# Patient Record
Sex: MALE | Race: WHITE | Employment: OTHER | URBAN - METROPOLITAN AREA
[De-identification: names, ages, dates, MRNs, and addresses within clinical notes are randomized per-mention and may not be internally consistent; named-entity substitution may affect disease eponyms.]

---

## 2018-06-05 PROBLEM — F39 MOOD DISORDER (HCC): Status: ACTIVE | Noted: 2018-06-05

## 2018-09-01 ENCOUNTER — HOSPITAL ENCOUNTER (EMERGENCY)
Age: 43
Discharge: HOME | End: 2018-09-01
Attending: EMERGENCY MEDICINE
Payer: MEDICARE

## 2018-09-01 VITALS
WEIGHT: 160 LBS | HEIGHT: 72 IN | BODY MASS INDEX: 21.67 KG/M2 | TEMPERATURE: 97.5 F | DIASTOLIC BLOOD PRESSURE: 106 MMHG | OXYGEN SATURATION: 95 % | RESPIRATION RATE: 16 BRPM | SYSTOLIC BLOOD PRESSURE: 150 MMHG | HEART RATE: 118 BPM

## 2018-09-01 DIAGNOSIS — F10.920 ACUTE ALCOHOLIC INTOXICATION WITHOUT COMPLICATION (HCC): ICD-10-CM

## 2018-09-01 DIAGNOSIS — F17.200 SMOKER: ICD-10-CM

## 2018-09-01 DIAGNOSIS — F41.0 PANIC ATTACKS: Primary | ICD-10-CM

## 2018-09-01 DIAGNOSIS — F99 CO-OCCURRENCE OF MULTIPLE PSYCHIATRIC DISORDERS: ICD-10-CM

## 2018-09-01 DIAGNOSIS — R03.0 ELEVATED BLOOD PRESSURE READING: ICD-10-CM

## 2018-09-01 PROCEDURE — 6370000000 HC RX 637 (ALT 250 FOR IP): Performed by: EMERGENCY MEDICINE

## 2018-09-01 PROCEDURE — 99283 EMERGENCY DEPT VISIT LOW MDM: CPT

## 2018-09-01 RX ORDER — HYDROXYZINE HYDROCHLORIDE 25 MG/1
25 TABLET, FILM COATED ORAL 3 TIMES DAILY PRN
Qty: 30 TABLET | Refills: 0 | Status: SHIPPED | OUTPATIENT
Start: 2018-09-01 | End: 2018-09-11

## 2018-09-01 RX ORDER — RISPERIDONE 4 MG/1
4 TABLET, FILM COATED ORAL DAILY
COMMUNITY
End: 2019-01-05 | Stop reason: ALTCHOICE

## 2018-09-01 RX ORDER — HYDROXYZINE PAMOATE 25 MG/1
25 CAPSULE ORAL ONCE
Status: COMPLETED | OUTPATIENT
Start: 2018-09-01 | End: 2018-09-01

## 2018-09-01 RX ORDER — QUETIAPINE FUMARATE 100 MG/1
50 TABLET, FILM COATED ORAL 3 TIMES DAILY
COMMUNITY
End: 2019-12-25

## 2018-09-01 RX ADMIN — HYDROXYZINE PAMOATE 25 MG: 25 CAPSULE ORAL at 00:51

## 2018-09-01 NOTE — ED PROVIDER NOTES
Pressure Father     Heart Disease Maternal Grandmother         MI    High Blood Pressure Maternal Grandmother     Cancer Maternal Grandmother     Depression Maternal Grandmother         panic    Anxiety Disorder Maternal Grandmother     High Blood Pressure Maternal Grandfather     Heart Disease Paternal Grandmother         MI    Stroke Paternal Grandmother     High Blood Pressure Paternal Grandmother     Alcohol Abuse Paternal Grandmother     Heart Disease Paternal Grandfather         MI    High Blood Pressure Paternal Grandfather     Alcohol Abuse Paternal Grandfather     Substance Abuse Paternal Grandfather     Alcohol Abuse Paternal Aunt     Alcohol Abuse Paternal Uncle      Social History     Social History    Marital status: Legally      Spouse name: N/A    Number of children: 1    Years of education: N/A     Occupational History          Social History Main Topics    Smoking status: Current Every Day Smoker     Packs/day: 1.00     Years: 19.00     Types: Cigarettes    Smokeless tobacco: Former User    Alcohol use 3.0 oz/week     5 Cans of beer per week      Comment: almost daily    Drug use: No    Sexual activity: No     Other Topics Concern    Not on file     Social History Narrative    No narrative on file     No current facility-administered medications for this encounter. Current Outpatient Prescriptions   Medication Sig Dispense Refill    risperiDONE (RISPERDAL) 4 MG tablet Take 4 mg by mouth daily      QUEtiapine (SEROQUEL) 100 MG tablet Take 100 mg by mouth 3 times daily      Desvenlafaxine Succinate (PRISTIQ PO) Take 100 mg by mouth daily      clonazePAM (KLONOPIN) 1 MG tablet Take 1 mg by mouth as needed for Anxiety. Bedelia Sol methylphenidate (RITALIN) 20 MG tablet Take 20 mg by mouth 2 times daily. Nurys Frias No Known Allergies    REVIEW OF SYSTEMS  10 systems reviewed, pertinent positives per HPI otherwise noted to be negative.     PHYSICAL

## 2018-09-10 ENCOUNTER — APPOINTMENT (OUTPATIENT)
Dept: GENERAL RADIOLOGY | Age: 43
End: 2018-09-10
Payer: MEDICARE

## 2018-09-10 ENCOUNTER — HOSPITAL ENCOUNTER (EMERGENCY)
Age: 43
Discharge: LEFT BEFORE DISCHARGE | End: 2018-09-10
Attending: EMERGENCY MEDICINE
Payer: MEDICARE

## 2018-09-10 VITALS
HEART RATE: 122 BPM | DIASTOLIC BLOOD PRESSURE: 134 MMHG | HEIGHT: 72 IN | WEIGHT: 160 LBS | SYSTOLIC BLOOD PRESSURE: 162 MMHG | OXYGEN SATURATION: 98 % | TEMPERATURE: 97.9 F | BODY MASS INDEX: 21.67 KG/M2 | RESPIRATION RATE: 20 BRPM

## 2018-09-10 DIAGNOSIS — F41.1 ANXIETY REACTION: Primary | ICD-10-CM

## 2018-09-10 LAB
ANION GAP SERPL CALCULATED.3IONS-SCNC: 19 MMOL/L (ref 3–16)
BASOPHILS ABSOLUTE: 0 K/UL (ref 0–0.2)
BASOPHILS RELATIVE PERCENT: 0.4 %
BUN BLDV-MCNC: 7 MG/DL (ref 7–20)
CALCIUM SERPL-MCNC: 9.8 MG/DL (ref 8.3–10.6)
CHLORIDE BLD-SCNC: 95 MMOL/L (ref 99–110)
CO2: 20 MMOL/L (ref 21–32)
CREAT SERPL-MCNC: 0.8 MG/DL (ref 0.9–1.3)
EOSINOPHILS ABSOLUTE: 0 K/UL (ref 0–0.6)
EOSINOPHILS RELATIVE PERCENT: 0.2 %
ETHANOL: 400 MG/DL (ref 0–0.08)
GFR AFRICAN AMERICAN: >60
GFR NON-AFRICAN AMERICAN: >60
GLUCOSE BLD-MCNC: 147 MG/DL (ref 70–99)
HCT VFR BLD CALC: 47 % (ref 40.5–52.5)
HEMOGLOBIN: 16.5 G/DL (ref 13.5–17.5)
LYMPHOCYTES ABSOLUTE: 1.5 K/UL (ref 1–5.1)
LYMPHOCYTES RELATIVE PERCENT: 19.9 %
MAGNESIUM: 2 MG/DL (ref 1.8–2.4)
MCH RBC QN AUTO: 32.9 PG (ref 26–34)
MCHC RBC AUTO-ENTMCNC: 35 G/DL (ref 31–36)
MCV RBC AUTO: 94 FL (ref 80–100)
MONOCYTES ABSOLUTE: 0.7 K/UL (ref 0–1.3)
MONOCYTES RELATIVE PERCENT: 9.7 %
NEUTROPHILS ABSOLUTE: 5.3 K/UL (ref 1.7–7.7)
NEUTROPHILS RELATIVE PERCENT: 69.8 %
PDW BLD-RTO: 14.8 % (ref 12.4–15.4)
PLATELET # BLD: 170 K/UL (ref 135–450)
PMV BLD AUTO: 7.2 FL (ref 5–10.5)
POTASSIUM SERPL-SCNC: 3.5 MMOL/L (ref 3.5–5.1)
RBC # BLD: 5 M/UL (ref 4.2–5.9)
SODIUM BLD-SCNC: 134 MMOL/L (ref 136–145)
WBC # BLD: 7.6 K/UL (ref 4–11)

## 2018-09-10 PROCEDURE — 96360 HYDRATION IV INFUSION INIT: CPT

## 2018-09-10 PROCEDURE — 83735 ASSAY OF MAGNESIUM: CPT

## 2018-09-10 PROCEDURE — G0480 DRUG TEST DEF 1-7 CLASSES: HCPCS

## 2018-09-10 PROCEDURE — 99284 EMERGENCY DEPT VISIT MOD MDM: CPT

## 2018-09-10 PROCEDURE — 71046 X-RAY EXAM CHEST 2 VIEWS: CPT

## 2018-09-10 PROCEDURE — 80048 BASIC METABOLIC PNL TOTAL CA: CPT

## 2018-09-10 PROCEDURE — 6370000000 HC RX 637 (ALT 250 FOR IP): Performed by: EMERGENCY MEDICINE

## 2018-09-10 PROCEDURE — 85025 COMPLETE CBC W/AUTO DIFF WBC: CPT

## 2018-09-10 PROCEDURE — 2580000003 HC RX 258: Performed by: EMERGENCY MEDICINE

## 2018-09-10 RX ORDER — 0.9 % SODIUM CHLORIDE 0.9 %
1000 INTRAVENOUS SOLUTION INTRAVENOUS ONCE
Status: COMPLETED | OUTPATIENT
Start: 2018-09-10 | End: 2018-09-10

## 2018-09-10 RX ORDER — LORAZEPAM 1 MG/1
0.5 TABLET ORAL ONCE
Status: COMPLETED | OUTPATIENT
Start: 2018-09-10 | End: 2018-09-10

## 2018-09-10 RX ADMIN — LORAZEPAM 0.5 MG: 1 TABLET ORAL at 07:44

## 2018-09-10 RX ADMIN — SODIUM CHLORIDE 1000 ML: 9 INJECTION, SOLUTION INTRAVENOUS at 07:44

## 2018-09-10 NOTE — ED NOTES
When writer arrived to room, IV cath was attached to saline tubing, saline and blood on floor. Patient left without discharge papers.      Shaniqua Tang RN  09/10/18 4083

## 2018-09-30 ENCOUNTER — APPOINTMENT (OUTPATIENT)
Dept: GENERAL RADIOLOGY | Age: 43
End: 2018-09-30
Payer: MEDICARE

## 2018-09-30 ENCOUNTER — HOSPITAL ENCOUNTER (EMERGENCY)
Age: 43
Discharge: HOME | End: 2018-10-01
Attending: EMERGENCY MEDICINE
Payer: MEDICARE

## 2018-09-30 DIAGNOSIS — F10.920 ACUTE ALCOHOLIC INTOXICATION WITHOUT COMPLICATION (HCC): ICD-10-CM

## 2018-09-30 DIAGNOSIS — F41.9 ANXIETY: Primary | ICD-10-CM

## 2018-09-30 LAB
BASOPHILS ABSOLUTE: 0.1 K/UL (ref 0–0.2)
BASOPHILS RELATIVE PERCENT: 0.9 %
EOSINOPHILS ABSOLUTE: 0.1 K/UL (ref 0–0.6)
EOSINOPHILS RELATIVE PERCENT: 0.6 %
HCT VFR BLD CALC: 45.6 % (ref 40.5–52.5)
HEMOGLOBIN: 16.2 G/DL (ref 13.5–17.5)
LYMPHOCYTES ABSOLUTE: 1.8 K/UL (ref 1–5.1)
LYMPHOCYTES RELATIVE PERCENT: 20.7 %
MCH RBC QN AUTO: 33.2 PG (ref 26–34)
MCHC RBC AUTO-ENTMCNC: 35.5 G/DL (ref 31–36)
MCV RBC AUTO: 93.4 FL (ref 80–100)
MONOCYTES ABSOLUTE: 0.8 K/UL (ref 0–1.3)
MONOCYTES RELATIVE PERCENT: 9 %
NEUTROPHILS ABSOLUTE: 6.1 K/UL (ref 1.7–7.7)
NEUTROPHILS RELATIVE PERCENT: 68.8 %
PDW BLD-RTO: 14.4 % (ref 12.4–15.4)
PLATELET # BLD: 270 K/UL (ref 135–450)
PMV BLD AUTO: 7.4 FL (ref 5–10.5)
RBC # BLD: 4.88 M/UL (ref 4.2–5.9)
WBC # BLD: 8.8 K/UL (ref 4–11)

## 2018-09-30 PROCEDURE — 80307 DRUG TEST PRSMV CHEM ANLYZR: CPT

## 2018-09-30 PROCEDURE — 71046 X-RAY EXAM CHEST 2 VIEWS: CPT

## 2018-09-30 PROCEDURE — 6370000000 HC RX 637 (ALT 250 FOR IP): Performed by: EMERGENCY MEDICINE

## 2018-09-30 PROCEDURE — 36415 COLL VENOUS BLD VENIPUNCTURE: CPT

## 2018-09-30 PROCEDURE — G0480 DRUG TEST DEF 1-7 CLASSES: HCPCS

## 2018-09-30 PROCEDURE — 80053 COMPREHEN METABOLIC PANEL: CPT

## 2018-09-30 PROCEDURE — 85025 COMPLETE CBC W/AUTO DIFF WBC: CPT

## 2018-09-30 PROCEDURE — 85379 FIBRIN DEGRADATION QUANT: CPT

## 2018-09-30 PROCEDURE — 99284 EMERGENCY DEPT VISIT MOD MDM: CPT

## 2018-09-30 PROCEDURE — 93005 ELECTROCARDIOGRAM TRACING: CPT | Performed by: EMERGENCY MEDICINE

## 2018-09-30 RX ORDER — CLONAZEPAM 0.5 MG/1
1 TABLET ORAL ONCE
Status: COMPLETED | OUTPATIENT
Start: 2018-09-30 | End: 2018-09-30

## 2018-09-30 RX ADMIN — CLONAZEPAM 1 MG: 0.5 TABLET ORAL at 23:08

## 2018-09-30 ASSESSMENT — PAIN DESCRIPTION - LOCATION: LOCATION: ABDOMEN;HEAD

## 2018-09-30 ASSESSMENT — PAIN SCALES - GENERAL: PAINLEVEL_OUTOF10: 9

## 2018-10-01 VITALS
TEMPERATURE: 97.7 F | HEIGHT: 72 IN | SYSTOLIC BLOOD PRESSURE: 134 MMHG | RESPIRATION RATE: 16 BRPM | BODY MASS INDEX: 20.32 KG/M2 | WEIGHT: 150 LBS | HEART RATE: 62 BPM | OXYGEN SATURATION: 100 % | DIASTOLIC BLOOD PRESSURE: 76 MMHG

## 2018-10-01 LAB
A/G RATIO: 1.8 (ref 1.1–2.2)
ALBUMIN SERPL-MCNC: 4.6 G/DL (ref 3.4–5)
ALP BLD-CCNC: 103 U/L (ref 40–129)
ALT SERPL-CCNC: 70 U/L (ref 10–40)
AMPHETAMINE SCREEN, URINE: NORMAL
ANION GAP SERPL CALCULATED.3IONS-SCNC: 16 MMOL/L (ref 3–16)
AST SERPL-CCNC: 65 U/L (ref 15–37)
BARBITURATE SCREEN URINE: NORMAL
BENZODIAZEPINE SCREEN, URINE: NORMAL
BILIRUB SERPL-MCNC: <0.2 MG/DL (ref 0–1)
BUN BLDV-MCNC: 8 MG/DL (ref 7–20)
CALCIUM SERPL-MCNC: 9.7 MG/DL (ref 8.3–10.6)
CANNABINOID SCREEN URINE: NORMAL
CHLORIDE BLD-SCNC: 98 MMOL/L (ref 99–110)
CO2: 22 MMOL/L (ref 21–32)
COCAINE METABOLITE SCREEN URINE: NORMAL
CREAT SERPL-MCNC: 0.7 MG/DL (ref 0.9–1.3)
D DIMER: <200 NG/ML DDU (ref 0–229)
ETHANOL: 271 MG/DL (ref 0–0.08)
GFR AFRICAN AMERICAN: >60
GFR NON-AFRICAN AMERICAN: >60
GLOBULIN: 2.5 G/DL
GLUCOSE BLD-MCNC: 105 MG/DL (ref 70–99)
Lab: NORMAL
METHADONE SCREEN, URINE: NORMAL
OPIATE SCREEN URINE: NORMAL
OXYCODONE URINE: NORMAL
PH UA: 5
PHENCYCLIDINE SCREEN URINE: NORMAL
POTASSIUM SERPL-SCNC: 3.8 MMOL/L (ref 3.5–5.1)
PROPOXYPHENE SCREEN: NORMAL
SODIUM BLD-SCNC: 136 MMOL/L (ref 136–145)
TOTAL PROTEIN: 7.1 G/DL (ref 6.4–8.2)

## 2018-10-01 PROCEDURE — 93010 ELECTROCARDIOGRAM REPORT: CPT | Performed by: INTERNAL MEDICINE

## 2018-10-01 NOTE — ED PROVIDER NOTES
discharged   at this time. Patient was informed of this decision and agrees with plan. I have discussed lab and xray findings with patient and they understand. Questions were answered to the best of my ability. Discharge vitals:  Blood pressure 134/76, pulse 62, temperature 97.7 °F (36.5 °C), temperature source Oral, resp. rate 16, height 6' (1.829 m), weight 150 lb (68 kg), SpO2 100 %. Prescriptions given:   Discharge Medication List as of 10/1/2018  1:29 AM          This chart was created using Dragon voice recognition software.        Edith Modi MD  10/01/18 4922

## 2018-10-05 LAB
EKG ATRIAL RATE: 105 BPM
EKG DIAGNOSIS: NORMAL
EKG P AXIS: 72 DEGREES
EKG P-R INTERVAL: 128 MS
EKG Q-T INTERVAL: 358 MS
EKG QRS DURATION: 90 MS
EKG QTC CALCULATION (BAZETT): 473 MS
EKG R AXIS: -53 DEGREES
EKG T AXIS: 53 DEGREES
EKG VENTRICULAR RATE: 105 BPM

## 2019-01-05 ENCOUNTER — HOSPITAL ENCOUNTER (INPATIENT)
Age: 44
LOS: 9 days | Discharge: HOME | DRG: 439 | End: 2019-01-14
Attending: EMERGENCY MEDICINE | Admitting: INTERNAL MEDICINE
Payer: MEDICARE

## 2019-01-05 ENCOUNTER — APPOINTMENT (OUTPATIENT)
Dept: CT IMAGING | Age: 44
DRG: 439 | End: 2019-01-05
Payer: MEDICARE

## 2019-01-05 DIAGNOSIS — K85.20 ALCOHOL-INDUCED ACUTE PANCREATITIS, UNSPECIFIED COMPLICATION STATUS: Primary | ICD-10-CM

## 2019-01-05 DIAGNOSIS — F41.9 ANXIETY: ICD-10-CM

## 2019-01-05 DIAGNOSIS — K76.0 FATTY LIVER: ICD-10-CM

## 2019-01-05 DIAGNOSIS — E87.20 LACTIC ACIDOSIS: ICD-10-CM

## 2019-01-05 PROBLEM — K85.90 ACUTE PANCREATITIS WITHOUT NECROSIS OR INFECTION, UNSPECIFIED: Status: ACTIVE | Noted: 2019-01-05

## 2019-01-05 LAB
A/G RATIO: 0.8 (ref 1.1–2.2)
ALBUMIN SERPL-MCNC: 2.8 G/DL (ref 3.4–5)
ALP BLD-CCNC: 354 U/L (ref 40–129)
ALT SERPL-CCNC: 125 U/L (ref 10–40)
ANION GAP SERPL CALCULATED.3IONS-SCNC: 21 MMOL/L (ref 3–16)
ANISOCYTOSIS: ABNORMAL
AST SERPL-CCNC: 227 U/L (ref 15–37)
ATYPICAL LYMPHOCYTE RELATIVE PERCENT: 2 % (ref 0–6)
BANDED NEUTROPHILS RELATIVE PERCENT: 28 % (ref 0–7)
BASOPHILS ABSOLUTE: 0 K/UL (ref 0–0.2)
BASOPHILS RELATIVE PERCENT: 0 %
BILIRUB SERPL-MCNC: 3.4 MG/DL (ref 0–1)
BILIRUBIN URINE: NEGATIVE
BLOOD, URINE: NEGATIVE
BUN BLDV-MCNC: 4 MG/DL (ref 7–20)
CALCIUM SERPL-MCNC: 7.4 MG/DL (ref 8.3–10.6)
CHLORIDE BLD-SCNC: 82 MMOL/L (ref 99–110)
CLARITY: CLEAR
CO2: 18 MMOL/L (ref 21–32)
COLOR: YELLOW
CREAT SERPL-MCNC: 0.6 MG/DL (ref 0.9–1.3)
EOSINOPHILS ABSOLUTE: 0 K/UL (ref 0–0.6)
EOSINOPHILS RELATIVE PERCENT: 0 %
ETHANOL: 23 MG/DL (ref 0–0.08)
GFR AFRICAN AMERICAN: >60
GFR NON-AFRICAN AMERICAN: >60
GLOBULIN: 3.5 G/DL
GLUCOSE BLD-MCNC: 172 MG/DL (ref 70–99)
GLUCOSE URINE: NEGATIVE MG/DL
HCT VFR BLD CALC: 42.3 % (ref 40.5–52.5)
HEMOGLOBIN: 14.3 G/DL (ref 13.5–17.5)
KETONES, URINE: NEGATIVE MG/DL
LACTIC ACID: 1.5 MMOL/L (ref 0.4–2)
LACTIC ACID: 3.4 MMOL/L (ref 0.4–2)
LEUKOCYTE ESTERASE, URINE: NEGATIVE
LIPASE: >600 U/L (ref 13–60)
LYMPHOCYTES ABSOLUTE: 1.8 K/UL (ref 1–5.1)
LYMPHOCYTES RELATIVE PERCENT: 12 %
MACROCYTES: ABNORMAL
MCH RBC QN AUTO: 34.1 PG (ref 26–34)
MCHC RBC AUTO-ENTMCNC: 33.9 G/DL (ref 31–36)
MCV RBC AUTO: 100.6 FL (ref 80–100)
METAMYELOCYTES RELATIVE PERCENT: 1 %
MICROSCOPIC EXAMINATION: NORMAL
MONOCYTES ABSOLUTE: 0.6 K/UL (ref 0–1.3)
MONOCYTES RELATIVE PERCENT: 5 %
NEUTROPHILS ABSOLUTE: 10.4 K/UL (ref 1.7–7.7)
NEUTROPHILS RELATIVE PERCENT: 52 %
NITRITE, URINE: NEGATIVE
PDW BLD-RTO: 15.5 % (ref 12.4–15.4)
PH UA: 6.5
PLATELET # BLD: 111 K/UL (ref 135–450)
PMV BLD AUTO: 9.5 FL (ref 5–10.5)
POTASSIUM SERPL-SCNC: 5.2 MMOL/L (ref 3.5–5.1)
PROTEIN UA: NEGATIVE MG/DL
RBC # BLD: 4.2 M/UL (ref 4.2–5.9)
SODIUM BLD-SCNC: 121 MMOL/L (ref 136–145)
SPECIFIC GRAVITY UA: <=1.005
SPECIMEN STATUS: NORMAL
TOTAL PROTEIN: 6.3 G/DL (ref 6.4–8.2)
URINE TYPE: NORMAL
UROBILINOGEN, URINE: 0.2 E.U./DL
WBC # BLD: 12.9 K/UL (ref 4–11)

## 2019-01-05 PROCEDURE — 6360000002 HC RX W HCPCS: Performed by: INTERNAL MEDICINE

## 2019-01-05 PROCEDURE — 74177 CT ABD & PELVIS W/CONTRAST: CPT

## 2019-01-05 PROCEDURE — G0480 DRUG TEST DEF 1-7 CLASSES: HCPCS

## 2019-01-05 PROCEDURE — 6360000004 HC RX CONTRAST MEDICATION: Performed by: EMERGENCY MEDICINE

## 2019-01-05 PROCEDURE — 96376 TX/PRO/DX INJ SAME DRUG ADON: CPT

## 2019-01-05 PROCEDURE — 81003 URINALYSIS AUTO W/O SCOPE: CPT

## 2019-01-05 PROCEDURE — 6360000002 HC RX W HCPCS: Performed by: EMERGENCY MEDICINE

## 2019-01-05 PROCEDURE — 96375 TX/PRO/DX INJ NEW DRUG ADDON: CPT

## 2019-01-05 PROCEDURE — 96361 HYDRATE IV INFUSION ADD-ON: CPT

## 2019-01-05 PROCEDURE — 1200000000 HC SEMI PRIVATE

## 2019-01-05 PROCEDURE — 96374 THER/PROPH/DIAG INJ IV PUSH: CPT

## 2019-01-05 PROCEDURE — 85025 COMPLETE CBC W/AUTO DIFF WBC: CPT

## 2019-01-05 PROCEDURE — 6370000000 HC RX 637 (ALT 250 FOR IP): Performed by: INTERNAL MEDICINE

## 2019-01-05 PROCEDURE — 36415 COLL VENOUS BLD VENIPUNCTURE: CPT

## 2019-01-05 PROCEDURE — 99285 EMERGENCY DEPT VISIT HI MDM: CPT

## 2019-01-05 PROCEDURE — 2580000003 HC RX 258: Performed by: INTERNAL MEDICINE

## 2019-01-05 PROCEDURE — 2580000003 HC RX 258: Performed by: EMERGENCY MEDICINE

## 2019-01-05 PROCEDURE — 83690 ASSAY OF LIPASE: CPT

## 2019-01-05 PROCEDURE — 83605 ASSAY OF LACTIC ACID: CPT

## 2019-01-05 PROCEDURE — 80053 COMPREHEN METABOLIC PANEL: CPT

## 2019-01-05 RX ORDER — NICOTINE 21 MG/24HR
1 PATCH, TRANSDERMAL 24 HOURS TRANSDERMAL DAILY
Status: DISCONTINUED | OUTPATIENT
Start: 2019-01-05 | End: 2019-01-14 | Stop reason: HOSPADM

## 2019-01-05 RX ORDER — LORAZEPAM 1 MG/1
2 TABLET ORAL
Status: DISCONTINUED | OUTPATIENT
Start: 2019-01-05 | End: 2019-01-14 | Stop reason: HOSPADM

## 2019-01-05 RX ORDER — MORPHINE SULFATE 4 MG/ML
4 INJECTION, SOLUTION INTRAMUSCULAR; INTRAVENOUS
Status: DISCONTINUED | OUTPATIENT
Start: 2019-01-05 | End: 2019-01-08

## 2019-01-05 RX ORDER — LORAZEPAM 2 MG/ML
4 INJECTION INTRAMUSCULAR
Status: DISCONTINUED | OUTPATIENT
Start: 2019-01-05 | End: 2019-01-14 | Stop reason: HOSPADM

## 2019-01-05 RX ORDER — LORAZEPAM 2 MG/ML
1 INJECTION INTRAMUSCULAR
Status: DISCONTINUED | OUTPATIENT
Start: 2019-01-05 | End: 2019-01-14 | Stop reason: HOSPADM

## 2019-01-05 RX ORDER — THIAMINE HYDROCHLORIDE 100 MG/ML
100 INJECTION, SOLUTION INTRAMUSCULAR; INTRAVENOUS DAILY
Status: COMPLETED | OUTPATIENT
Start: 2019-01-05 | End: 2019-01-07

## 2019-01-05 RX ORDER — SODIUM CHLORIDE 0.9 % (FLUSH) 0.9 %
10 SYRINGE (ML) INJECTION PRN
Status: DISCONTINUED | OUTPATIENT
Start: 2019-01-05 | End: 2019-01-14 | Stop reason: HOSPADM

## 2019-01-05 RX ORDER — ONDANSETRON 2 MG/ML
4 INJECTION INTRAMUSCULAR; INTRAVENOUS ONCE
Status: COMPLETED | OUTPATIENT
Start: 2019-01-05 | End: 2019-01-05

## 2019-01-05 RX ORDER — LORAZEPAM 2 MG/ML
3 INJECTION INTRAMUSCULAR
Status: DISCONTINUED | OUTPATIENT
Start: 2019-01-05 | End: 2019-01-14 | Stop reason: HOSPADM

## 2019-01-05 RX ORDER — SODIUM CHLORIDE 0.9 % (FLUSH) 0.9 %
10 SYRINGE (ML) INJECTION EVERY 12 HOURS SCHEDULED
Status: DISCONTINUED | OUTPATIENT
Start: 2019-01-05 | End: 2019-01-14 | Stop reason: HOSPADM

## 2019-01-05 RX ORDER — SODIUM CHLORIDE 9 MG/ML
1000 INJECTION, SOLUTION INTRAVENOUS ONCE
Status: DISCONTINUED | OUTPATIENT
Start: 2019-01-05 | End: 2019-01-05

## 2019-01-05 RX ORDER — MORPHINE SULFATE 2 MG/ML
2 INJECTION, SOLUTION INTRAMUSCULAR; INTRAVENOUS
Status: DISCONTINUED | OUTPATIENT
Start: 2019-01-05 | End: 2019-01-08

## 2019-01-05 RX ORDER — MORPHINE SULFATE 4 MG/ML
4 INJECTION, SOLUTION INTRAMUSCULAR; INTRAVENOUS ONCE
Status: COMPLETED | OUTPATIENT
Start: 2019-01-05 | End: 2019-01-05

## 2019-01-05 RX ORDER — ONDANSETRON 2 MG/ML
4 INJECTION INTRAMUSCULAR; INTRAVENOUS EVERY 6 HOURS PRN
Status: DISCONTINUED | OUTPATIENT
Start: 2019-01-05 | End: 2019-01-14 | Stop reason: HOSPADM

## 2019-01-05 RX ORDER — LORAZEPAM 1 MG/1
4 TABLET ORAL
Status: DISCONTINUED | OUTPATIENT
Start: 2019-01-05 | End: 2019-01-14 | Stop reason: HOSPADM

## 2019-01-05 RX ORDER — SODIUM CHLORIDE 9 MG/ML
INJECTION, SOLUTION INTRAVENOUS CONTINUOUS
Status: DISCONTINUED | OUTPATIENT
Start: 2019-01-05 | End: 2019-01-14 | Stop reason: HOSPADM

## 2019-01-05 RX ORDER — ACETAMINOPHEN 325 MG/1
650 TABLET ORAL EVERY 4 HOURS PRN
Status: DISCONTINUED | OUTPATIENT
Start: 2019-01-05 | End: 2019-01-14 | Stop reason: HOSPADM

## 2019-01-05 RX ORDER — LORAZEPAM 2 MG/ML
2 INJECTION INTRAMUSCULAR
Status: DISCONTINUED | OUTPATIENT
Start: 2019-01-05 | End: 2019-01-14 | Stop reason: HOSPADM

## 2019-01-05 RX ORDER — LORAZEPAM 1 MG/1
1 TABLET ORAL
Status: DISCONTINUED | OUTPATIENT
Start: 2019-01-05 | End: 2019-01-14 | Stop reason: HOSPADM

## 2019-01-05 RX ORDER — LORAZEPAM 1 MG/1
3 TABLET ORAL
Status: DISCONTINUED | OUTPATIENT
Start: 2019-01-05 | End: 2019-01-14 | Stop reason: HOSPADM

## 2019-01-05 RX ORDER — SODIUM CHLORIDE 9 MG/ML
1000 INJECTION, SOLUTION INTRAVENOUS ONCE
Status: COMPLETED | OUTPATIENT
Start: 2019-01-05 | End: 2019-01-05

## 2019-01-05 RX ORDER — ESCITALOPRAM OXALATE 10 MG/1
10 TABLET ORAL DAILY
Status: ON HOLD | COMMUNITY
End: 2020-10-28 | Stop reason: ALTCHOICE

## 2019-01-05 RX ADMIN — SODIUM CHLORIDE: 9 INJECTION, SOLUTION INTRAVENOUS at 16:00

## 2019-01-05 RX ADMIN — SODIUM CHLORIDE 1000 ML: 9 INJECTION, SOLUTION INTRAVENOUS at 12:26

## 2019-01-05 RX ADMIN — MORPHINE SULFATE 4 MG: 4 INJECTION INTRAVENOUS at 16:48

## 2019-01-05 RX ADMIN — MORPHINE SULFATE 4 MG: 4 INJECTION INTRAVENOUS at 19:20

## 2019-01-05 RX ADMIN — ONDANSETRON 4 MG: 2 INJECTION INTRAMUSCULAR; INTRAVENOUS at 12:26

## 2019-01-05 RX ADMIN — MORPHINE SULFATE 4 MG: 4 INJECTION INTRAVENOUS at 14:41

## 2019-01-05 RX ADMIN — MORPHINE SULFATE 4 MG: 4 INJECTION INTRAVENOUS at 22:19

## 2019-01-05 RX ADMIN — THIAMINE HYDROCHLORIDE 100 MG: 100 INJECTION, SOLUTION INTRAMUSCULAR; INTRAVENOUS at 16:04

## 2019-01-05 RX ADMIN — IOPAMIDOL 75 ML: 755 INJECTION, SOLUTION INTRAVENOUS at 12:48

## 2019-01-05 RX ADMIN — SODIUM CHLORIDE: 9 INJECTION, SOLUTION INTRAVENOUS at 22:23

## 2019-01-05 RX ADMIN — MORPHINE SULFATE 4 MG: 4 INJECTION INTRAVENOUS at 12:26

## 2019-01-05 ASSESSMENT — ENCOUNTER SYMPTOMS
DIARRHEA: 1
COUGH: 0
VOMITING: 0
BACK PAIN: 0
NAUSEA: 0
SHORTNESS OF BREATH: 0
RHINORRHEA: 0
ABDOMINAL PAIN: 1
SORE THROAT: 0
TROUBLE SWALLOWING: 0
CONSTIPATION: 0
CHEST TIGHTNESS: 0

## 2019-01-05 ASSESSMENT — PAIN SCALES - GENERAL
PAINLEVEL_OUTOF10: 9
PAINLEVEL_OUTOF10: 7
PAINLEVEL_OUTOF10: 9
PAINLEVEL_OUTOF10: 8
PAINLEVEL_OUTOF10: 9
PAINLEVEL_OUTOF10: 6

## 2019-01-05 ASSESSMENT — PAIN DESCRIPTION - DESCRIPTORS
DESCRIPTORS: STABBING
DESCRIPTORS: STABBING

## 2019-01-05 ASSESSMENT — PAIN DESCRIPTION - LOCATION
LOCATION: ABDOMEN
LOCATION: ABDOMEN

## 2019-01-05 ASSESSMENT — PAIN DESCRIPTION - PAIN TYPE
TYPE: ACUTE PAIN
TYPE: ACUTE PAIN

## 2019-01-05 ASSESSMENT — PAIN DESCRIPTION - FREQUENCY: FREQUENCY: CONTINUOUS

## 2019-01-05 ASSESSMENT — PAIN DESCRIPTION - ORIENTATION
ORIENTATION: LEFT;LOWER
ORIENTATION: RIGHT

## 2019-01-06 LAB
ANION GAP SERPL CALCULATED.3IONS-SCNC: 10 MMOL/L (ref 3–16)
BUN BLDV-MCNC: 3 MG/DL (ref 7–20)
CALCIUM SERPL-MCNC: 7.1 MG/DL (ref 8.3–10.6)
CHLORIDE BLD-SCNC: 96 MMOL/L (ref 99–110)
CO2: 25 MMOL/L (ref 21–32)
CREAT SERPL-MCNC: 0.7 MG/DL (ref 0.9–1.3)
GFR AFRICAN AMERICAN: >60
GFR NON-AFRICAN AMERICAN: >60
GLUCOSE BLD-MCNC: 107 MG/DL (ref 70–99)
HCT VFR BLD CALC: 35.4 % (ref 40.5–52.5)
HEMOGLOBIN: 11.9 G/DL (ref 13.5–17.5)
LIPASE: 249 U/L (ref 13–60)
MCH RBC QN AUTO: 33.8 PG (ref 26–34)
MCHC RBC AUTO-ENTMCNC: 33.5 G/DL (ref 31–36)
MCV RBC AUTO: 100.9 FL (ref 80–100)
PDW BLD-RTO: 15.5 % (ref 12.4–15.4)
PLATELET # BLD: 84 K/UL (ref 135–450)
PMV BLD AUTO: 9.2 FL (ref 5–10.5)
POTASSIUM REFLEX MAGNESIUM: 3.9 MMOL/L (ref 3.5–5.1)
RBC # BLD: 3.51 M/UL (ref 4.2–5.9)
SODIUM BLD-SCNC: 131 MMOL/L (ref 136–145)
WBC # BLD: 7.7 K/UL (ref 4–11)

## 2019-01-06 PROCEDURE — 6360000002 HC RX W HCPCS: Performed by: INTERNAL MEDICINE

## 2019-01-06 PROCEDURE — 80048 BASIC METABOLIC PNL TOTAL CA: CPT

## 2019-01-06 PROCEDURE — C9113 INJ PANTOPRAZOLE SODIUM, VIA: HCPCS | Performed by: INTERNAL MEDICINE

## 2019-01-06 PROCEDURE — 85027 COMPLETE CBC AUTOMATED: CPT

## 2019-01-06 PROCEDURE — 36415 COLL VENOUS BLD VENIPUNCTURE: CPT

## 2019-01-06 PROCEDURE — 6370000000 HC RX 637 (ALT 250 FOR IP): Performed by: INTERNAL MEDICINE

## 2019-01-06 PROCEDURE — 1200000000 HC SEMI PRIVATE

## 2019-01-06 PROCEDURE — 2580000003 HC RX 258: Performed by: INTERNAL MEDICINE

## 2019-01-06 PROCEDURE — 83690 ASSAY OF LIPASE: CPT

## 2019-01-06 RX ORDER — PANTOPRAZOLE SODIUM 40 MG/10ML
40 INJECTION, POWDER, LYOPHILIZED, FOR SOLUTION INTRAVENOUS DAILY
Status: DISCONTINUED | OUTPATIENT
Start: 2019-01-06 | End: 2019-01-14 | Stop reason: HOSPADM

## 2019-01-06 RX ADMIN — MORPHINE SULFATE 4 MG: 4 INJECTION INTRAVENOUS at 17:21

## 2019-01-06 RX ADMIN — PANTOPRAZOLE SODIUM 40 MG: 40 INJECTION, POWDER, FOR SOLUTION INTRAVENOUS at 12:20

## 2019-01-06 RX ADMIN — MORPHINE SULFATE 4 MG: 4 INJECTION INTRAVENOUS at 09:57

## 2019-01-06 RX ADMIN — THIAMINE HYDROCHLORIDE 100 MG: 100 INJECTION, SOLUTION INTRAMUSCULAR; INTRAVENOUS at 08:05

## 2019-01-06 RX ADMIN — MORPHINE SULFATE 4 MG: 4 INJECTION INTRAVENOUS at 00:29

## 2019-01-06 RX ADMIN — MORPHINE SULFATE 4 MG: 4 INJECTION INTRAVENOUS at 21:40

## 2019-01-06 RX ADMIN — LORAZEPAM 2 MG: 2 INJECTION, SOLUTION INTRAMUSCULAR; INTRAVENOUS at 21:47

## 2019-01-06 RX ADMIN — LORAZEPAM 1 MG: 2 INJECTION, SOLUTION INTRAMUSCULAR; INTRAVENOUS at 14:29

## 2019-01-06 RX ADMIN — MORPHINE SULFATE 4 MG: 4 INJECTION INTRAVENOUS at 07:24

## 2019-01-06 RX ADMIN — MORPHINE SULFATE 4 MG: 4 INJECTION INTRAVENOUS at 19:40

## 2019-01-06 RX ADMIN — SODIUM CHLORIDE: 9 INJECTION, SOLUTION INTRAVENOUS at 08:05

## 2019-01-06 RX ADMIN — LORAZEPAM 1 MG: 1 TABLET ORAL at 11:15

## 2019-01-06 RX ADMIN — LORAZEPAM 2 MG: 2 INJECTION, SOLUTION INTRAMUSCULAR; INTRAVENOUS at 23:52

## 2019-01-06 RX ADMIN — MORPHINE SULFATE 4 MG: 4 INJECTION INTRAVENOUS at 03:34

## 2019-01-06 RX ADMIN — LORAZEPAM 2 MG: 2 INJECTION, SOLUTION INTRAMUSCULAR; INTRAVENOUS at 17:21

## 2019-01-06 RX ADMIN — MORPHINE SULFATE 4 MG: 4 INJECTION INTRAVENOUS at 12:20

## 2019-01-06 RX ADMIN — LORAZEPAM 2 MG: 2 INJECTION, SOLUTION INTRAMUSCULAR; INTRAVENOUS at 19:41

## 2019-01-06 RX ADMIN — MORPHINE SULFATE 4 MG: 4 INJECTION INTRAVENOUS at 14:25

## 2019-01-06 RX ADMIN — Medication 10 ML: at 08:05

## 2019-01-06 RX ADMIN — MORPHINE SULFATE 4 MG: 4 INJECTION INTRAVENOUS at 23:52

## 2019-01-06 ASSESSMENT — PAIN SCALES - GENERAL
PAINLEVEL_OUTOF10: 8
PAINLEVEL_OUTOF10: 10
PAINLEVEL_OUTOF10: 9
PAINLEVEL_OUTOF10: 10
PAINLEVEL_OUTOF10: 10
PAINLEVEL_OUTOF10: 9
PAINLEVEL_OUTOF10: 8

## 2019-01-07 LAB
ALBUMIN SERPL-MCNC: 2.5 G/DL (ref 3.4–5)
ALP BLD-CCNC: 209 U/L (ref 40–129)
ALT SERPL-CCNC: 66 U/L (ref 10–40)
ANION GAP SERPL CALCULATED.3IONS-SCNC: 14 MMOL/L (ref 3–16)
AST SERPL-CCNC: 95 U/L (ref 15–37)
BILIRUB SERPL-MCNC: 3.7 MG/DL (ref 0–1)
BILIRUBIN DIRECT: 2.9 MG/DL (ref 0–0.3)
BILIRUBIN, INDIRECT: 0.8 MG/DL (ref 0–1)
BUN BLDV-MCNC: 3 MG/DL (ref 7–20)
CALCIUM SERPL-MCNC: 7.6 MG/DL (ref 8.3–10.6)
CHLORIDE BLD-SCNC: 98 MMOL/L (ref 99–110)
CO2: 22 MMOL/L (ref 21–32)
CREAT SERPL-MCNC: 0.8 MG/DL (ref 0.9–1.3)
GFR AFRICAN AMERICAN: >60
GFR NON-AFRICAN AMERICAN: >60
GLUCOSE BLD-MCNC: 63 MG/DL (ref 70–99)
HCT VFR BLD CALC: 32.9 % (ref 40.5–52.5)
HEMOGLOBIN: 10.8 G/DL (ref 13.5–17.5)
LIPASE: 106 U/L (ref 13–60)
MAGNESIUM: 1.8 MG/DL (ref 1.8–2.4)
MCH RBC QN AUTO: 34 PG (ref 26–34)
MCHC RBC AUTO-ENTMCNC: 33 G/DL (ref 31–36)
MCV RBC AUTO: 103 FL (ref 80–100)
PDW BLD-RTO: 15.4 % (ref 12.4–15.4)
PLATELET # BLD: 100 K/UL (ref 135–450)
PMV BLD AUTO: 8.1 FL (ref 5–10.5)
POTASSIUM REFLEX MAGNESIUM: 3.5 MMOL/L (ref 3.5–5.1)
RBC # BLD: 3.19 M/UL (ref 4.2–5.9)
SODIUM BLD-SCNC: 134 MMOL/L (ref 136–145)
TOTAL PROTEIN: 5.5 G/DL (ref 6.4–8.2)
WBC # BLD: 7.4 K/UL (ref 4–11)

## 2019-01-07 PROCEDURE — 83735 ASSAY OF MAGNESIUM: CPT

## 2019-01-07 PROCEDURE — 85027 COMPLETE CBC AUTOMATED: CPT

## 2019-01-07 PROCEDURE — 6370000000 HC RX 637 (ALT 250 FOR IP): Performed by: INTERNAL MEDICINE

## 2019-01-07 PROCEDURE — 36415 COLL VENOUS BLD VENIPUNCTURE: CPT

## 2019-01-07 PROCEDURE — 6360000002 HC RX W HCPCS: Performed by: INTERNAL MEDICINE

## 2019-01-07 PROCEDURE — C9113 INJ PANTOPRAZOLE SODIUM, VIA: HCPCS | Performed by: INTERNAL MEDICINE

## 2019-01-07 PROCEDURE — 83690 ASSAY OF LIPASE: CPT

## 2019-01-07 PROCEDURE — 1200000000 HC SEMI PRIVATE

## 2019-01-07 PROCEDURE — 80076 HEPATIC FUNCTION PANEL: CPT

## 2019-01-07 PROCEDURE — 2580000003 HC RX 258: Performed by: INTERNAL MEDICINE

## 2019-01-07 PROCEDURE — 80048 BASIC METABOLIC PNL TOTAL CA: CPT

## 2019-01-07 RX ADMIN — SODIUM CHLORIDE: 9 INJECTION, SOLUTION INTRAVENOUS at 19:40

## 2019-01-07 RX ADMIN — LORAZEPAM 2 MG: 2 INJECTION, SOLUTION INTRAMUSCULAR; INTRAVENOUS at 10:35

## 2019-01-07 RX ADMIN — ACETAMINOPHEN 650 MG: 325 TABLET ORAL at 19:58

## 2019-01-07 RX ADMIN — MORPHINE SULFATE 4 MG: 4 INJECTION INTRAVENOUS at 15:43

## 2019-01-07 RX ADMIN — LORAZEPAM 2 MG: 2 INJECTION, SOLUTION INTRAMUSCULAR; INTRAVENOUS at 20:44

## 2019-01-07 RX ADMIN — MORPHINE SULFATE 4 MG: 4 INJECTION INTRAVENOUS at 11:24

## 2019-01-07 RX ADMIN — MORPHINE SULFATE 2 MG: 2 INJECTION, SOLUTION INTRAMUSCULAR; INTRAVENOUS at 17:56

## 2019-01-07 RX ADMIN — MORPHINE SULFATE 4 MG: 4 INJECTION INTRAVENOUS at 04:25

## 2019-01-07 RX ADMIN — LORAZEPAM 1 MG: 1 TABLET ORAL at 08:04

## 2019-01-07 RX ADMIN — PANTOPRAZOLE SODIUM 40 MG: 40 INJECTION, POWDER, FOR SOLUTION INTRAVENOUS at 08:03

## 2019-01-07 RX ADMIN — MORPHINE SULFATE 4 MG: 4 INJECTION INTRAVENOUS at 13:35

## 2019-01-07 RX ADMIN — LORAZEPAM 2 MG: 2 INJECTION, SOLUTION INTRAMUSCULAR; INTRAVENOUS at 04:27

## 2019-01-07 RX ADMIN — ONDANSETRON 4 MG: 2 INJECTION INTRAMUSCULAR; INTRAVENOUS at 21:05

## 2019-01-07 RX ADMIN — LORAZEPAM 2 MG: 2 INJECTION, SOLUTION INTRAMUSCULAR; INTRAVENOUS at 02:17

## 2019-01-07 RX ADMIN — MORPHINE SULFATE 4 MG: 4 INJECTION INTRAVENOUS at 02:15

## 2019-01-07 RX ADMIN — THIAMINE HYDROCHLORIDE 100 MG: 100 INJECTION, SOLUTION INTRAMUSCULAR; INTRAVENOUS at 08:04

## 2019-01-07 RX ADMIN — MORPHINE SULFATE 4 MG: 4 INJECTION INTRAVENOUS at 19:58

## 2019-01-07 RX ADMIN — MORPHINE SULFATE 4 MG: 4 INJECTION INTRAVENOUS at 22:33

## 2019-01-07 RX ADMIN — SODIUM CHLORIDE: 9 INJECTION, SOLUTION INTRAVENOUS at 01:08

## 2019-01-07 RX ADMIN — MORPHINE SULFATE 4 MG: 4 INJECTION INTRAVENOUS at 06:33

## 2019-01-07 RX ADMIN — Medication 10 ML: at 08:04

## 2019-01-07 RX ADMIN — MORPHINE SULFATE 2 MG: 2 INJECTION, SOLUTION INTRAMUSCULAR; INTRAVENOUS at 08:51

## 2019-01-07 ASSESSMENT — PAIN DESCRIPTION - PAIN TYPE: TYPE: ACUTE PAIN

## 2019-01-07 ASSESSMENT — PAIN SCALES - GENERAL
PAINLEVEL_OUTOF10: 9
PAINLEVEL_OUTOF10: 8
PAINLEVEL_OUTOF10: 9
PAINLEVEL_OUTOF10: 7
PAINLEVEL_OUTOF10: 8
PAINLEVEL_OUTOF10: 9
PAINLEVEL_OUTOF10: 6
PAINLEVEL_OUTOF10: 9
PAINLEVEL_OUTOF10: 0
PAINLEVEL_OUTOF10: 9
PAINLEVEL_OUTOF10: 6

## 2019-01-07 ASSESSMENT — PAIN DESCRIPTION - FREQUENCY: FREQUENCY: CONTINUOUS

## 2019-01-07 ASSESSMENT — PAIN DESCRIPTION - DESCRIPTORS: DESCRIPTORS: STABBING

## 2019-01-07 ASSESSMENT — PAIN DESCRIPTION - PROGRESSION: CLINICAL_PROGRESSION: NOT CHANGED

## 2019-01-07 ASSESSMENT — PAIN DESCRIPTION - ORIENTATION: ORIENTATION: RIGHT;LOWER

## 2019-01-07 ASSESSMENT — PAIN DESCRIPTION - ONSET: ONSET: ON-GOING

## 2019-01-07 ASSESSMENT — PAIN DESCRIPTION - LOCATION: LOCATION: ABDOMEN

## 2019-01-08 ENCOUNTER — APPOINTMENT (OUTPATIENT)
Dept: GENERAL RADIOLOGY | Age: 44
DRG: 439 | End: 2019-01-08
Payer: MEDICARE

## 2019-01-08 LAB
ALBUMIN SERPL-MCNC: 2.4 G/DL (ref 3.4–5)
ALP BLD-CCNC: 208 U/L (ref 40–129)
ALT SERPL-CCNC: 75 U/L (ref 10–40)
ANION GAP SERPL CALCULATED.3IONS-SCNC: 17 MMOL/L (ref 3–16)
AST SERPL-CCNC: 115 U/L (ref 15–37)
BILIRUB SERPL-MCNC: 4.2 MG/DL (ref 0–1)
BILIRUBIN DIRECT: 3.5 MG/DL (ref 0–0.3)
BILIRUBIN, INDIRECT: 0.7 MG/DL (ref 0–1)
BUN BLDV-MCNC: 3 MG/DL (ref 7–20)
CALCIUM SERPL-MCNC: 8 MG/DL (ref 8.3–10.6)
CHLORIDE BLD-SCNC: 97 MMOL/L (ref 99–110)
CO2: 20 MMOL/L (ref 21–32)
CREAT SERPL-MCNC: 0.7 MG/DL (ref 0.9–1.3)
GFR AFRICAN AMERICAN: >60
GFR NON-AFRICAN AMERICAN: >60
GLUCOSE BLD-MCNC: 59 MG/DL (ref 70–99)
GLUCOSE BLD-MCNC: 74 MG/DL (ref 70–99)
HCT VFR BLD CALC: 30.8 % (ref 40.5–52.5)
HEMOGLOBIN: 10.1 G/DL (ref 13.5–17.5)
MAGNESIUM: 1.8 MG/DL (ref 1.8–2.4)
MCH RBC QN AUTO: 34 PG (ref 26–34)
MCHC RBC AUTO-ENTMCNC: 32.6 G/DL (ref 31–36)
MCV RBC AUTO: 104.1 FL (ref 80–100)
PDW BLD-RTO: 14.8 % (ref 12.4–15.4)
PERFORMED ON: NORMAL
PLATELET # BLD: 116 K/UL (ref 135–450)
PMV BLD AUTO: 8 FL (ref 5–10.5)
POTASSIUM REFLEX MAGNESIUM: 3.3 MMOL/L (ref 3.5–5.1)
RAPID INFLUENZA  B AGN: NEGATIVE
RAPID INFLUENZA A AGN: NEGATIVE
RBC # BLD: 2.96 M/UL (ref 4.2–5.9)
SODIUM BLD-SCNC: 134 MMOL/L (ref 136–145)
TOTAL PROTEIN: 5.4 G/DL (ref 6.4–8.2)
WBC # BLD: 7.8 K/UL (ref 4–11)

## 2019-01-08 PROCEDURE — 6370000000 HC RX 637 (ALT 250 FOR IP): Performed by: INTERNAL MEDICINE

## 2019-01-08 PROCEDURE — 1200000000 HC SEMI PRIVATE

## 2019-01-08 PROCEDURE — 2580000003 HC RX 258: Performed by: INTERNAL MEDICINE

## 2019-01-08 PROCEDURE — 36415 COLL VENOUS BLD VENIPUNCTURE: CPT

## 2019-01-08 PROCEDURE — 87804 INFLUENZA ASSAY W/OPTIC: CPT

## 2019-01-08 PROCEDURE — 6360000002 HC RX W HCPCS: Performed by: INTERNAL MEDICINE

## 2019-01-08 PROCEDURE — 80048 BASIC METABOLIC PNL TOTAL CA: CPT

## 2019-01-08 PROCEDURE — 71046 X-RAY EXAM CHEST 2 VIEWS: CPT

## 2019-01-08 PROCEDURE — 80076 HEPATIC FUNCTION PANEL: CPT

## 2019-01-08 PROCEDURE — 85027 COMPLETE CBC AUTOMATED: CPT

## 2019-01-08 PROCEDURE — 83735 ASSAY OF MAGNESIUM: CPT

## 2019-01-08 PROCEDURE — C9113 INJ PANTOPRAZOLE SODIUM, VIA: HCPCS | Performed by: INTERNAL MEDICINE

## 2019-01-08 RX ORDER — MORPHINE SULFATE 2 MG/ML
2 INJECTION, SOLUTION INTRAMUSCULAR; INTRAVENOUS EVERY 4 HOURS PRN
Status: DISCONTINUED | OUTPATIENT
Start: 2019-01-08 | End: 2019-01-12

## 2019-01-08 RX ADMIN — MORPHINE SULFATE 2 MG: 2 INJECTION, SOLUTION INTRAMUSCULAR; INTRAVENOUS at 10:18

## 2019-01-08 RX ADMIN — MORPHINE SULFATE 4 MG: 4 INJECTION INTRAVENOUS at 05:48

## 2019-01-08 RX ADMIN — ENOXAPARIN SODIUM 40 MG: 100 INJECTION SUBCUTANEOUS at 08:12

## 2019-01-08 RX ADMIN — LORAZEPAM 1 MG: 1 TABLET ORAL at 12:43

## 2019-01-08 RX ADMIN — MORPHINE SULFATE 4 MG: 4 INJECTION INTRAVENOUS at 03:46

## 2019-01-08 RX ADMIN — SODIUM CHLORIDE: 9 INJECTION, SOLUTION INTRAVENOUS at 03:46

## 2019-01-08 RX ADMIN — ONDANSETRON 4 MG: 2 INJECTION INTRAMUSCULAR; INTRAVENOUS at 19:45

## 2019-01-08 RX ADMIN — SODIUM CHLORIDE: 9 INJECTION, SOLUTION INTRAVENOUS at 19:46

## 2019-01-08 RX ADMIN — Medication 10 ML: at 08:13

## 2019-01-08 RX ADMIN — PANTOPRAZOLE SODIUM 40 MG: 40 INJECTION, POWDER, FOR SOLUTION INTRAVENOUS at 08:12

## 2019-01-08 RX ADMIN — Medication 10 ML: at 19:46

## 2019-01-08 RX ADMIN — LORAZEPAM 3 MG: 1 TABLET ORAL at 19:46

## 2019-01-08 RX ADMIN — MORPHINE SULFATE 2 MG: 2 INJECTION, SOLUTION INTRAMUSCULAR; INTRAVENOUS at 18:45

## 2019-01-08 RX ADMIN — MORPHINE SULFATE 2 MG: 2 INJECTION, SOLUTION INTRAMUSCULAR; INTRAVENOUS at 14:32

## 2019-01-08 RX ADMIN — LORAZEPAM 1 MG: 1 TABLET ORAL at 15:49

## 2019-01-08 RX ADMIN — ACETAMINOPHEN 650 MG: 325 TABLET ORAL at 19:45

## 2019-01-08 RX ADMIN — ONDANSETRON 4 MG: 2 INJECTION INTRAMUSCULAR; INTRAVENOUS at 05:54

## 2019-01-08 RX ADMIN — MORPHINE SULFATE 2 MG: 2 INJECTION, SOLUTION INTRAMUSCULAR; INTRAVENOUS at 22:54

## 2019-01-08 RX ADMIN — SODIUM CHLORIDE: 9 INJECTION, SOLUTION INTRAVENOUS at 17:14

## 2019-01-08 RX ADMIN — MORPHINE SULFATE 4 MG: 4 INJECTION INTRAVENOUS at 08:12

## 2019-01-08 RX ADMIN — MORPHINE SULFATE 4 MG: 4 INJECTION INTRAVENOUS at 00:57

## 2019-01-08 RX ADMIN — LORAZEPAM 1 MG: 1 TABLET ORAL at 23:14

## 2019-01-08 ASSESSMENT — PAIN SCALES - GENERAL
PAINLEVEL_OUTOF10: 8
PAINLEVEL_OUTOF10: 9
PAINLEVEL_OUTOF10: 6
PAINLEVEL_OUTOF10: 8
PAINLEVEL_OUTOF10: 8
PAINLEVEL_OUTOF10: 9
PAINLEVEL_OUTOF10: 10
PAINLEVEL_OUTOF10: 6
PAINLEVEL_OUTOF10: 9
PAINLEVEL_OUTOF10: 9

## 2019-01-09 LAB
ALBUMIN SERPL-MCNC: 2.3 G/DL (ref 3.4–5)
ALP BLD-CCNC: 189 U/L (ref 40–129)
ALT SERPL-CCNC: 70 U/L (ref 10–40)
ANION GAP SERPL CALCULATED.3IONS-SCNC: 19 MMOL/L (ref 3–16)
AST SERPL-CCNC: 92 U/L (ref 15–37)
BILIRUB SERPL-MCNC: 3.6 MG/DL (ref 0–1)
BILIRUBIN DIRECT: 2.9 MG/DL (ref 0–0.3)
BILIRUBIN, INDIRECT: 0.7 MG/DL (ref 0–1)
BUN BLDV-MCNC: <2 MG/DL (ref 7–20)
CALCIUM SERPL-MCNC: 8.2 MG/DL (ref 8.3–10.6)
CHLORIDE BLD-SCNC: 96 MMOL/L (ref 99–110)
CO2: 17 MMOL/L (ref 21–32)
CREAT SERPL-MCNC: 0.6 MG/DL (ref 0.9–1.3)
GFR AFRICAN AMERICAN: >60
GFR NON-AFRICAN AMERICAN: >60
GLUCOSE BLD-MCNC: 77 MG/DL (ref 70–99)
HCT VFR BLD CALC: 28.4 % (ref 40.5–52.5)
HEMOGLOBIN: 9.6 G/DL (ref 13.5–17.5)
MAGNESIUM: 1.7 MG/DL (ref 1.8–2.4)
MCH RBC QN AUTO: 34.2 PG (ref 26–34)
MCHC RBC AUTO-ENTMCNC: 33.7 G/DL (ref 31–36)
MCV RBC AUTO: 101.6 FL (ref 80–100)
PDW BLD-RTO: 14.8 % (ref 12.4–15.4)
PLATELET # BLD: 138 K/UL (ref 135–450)
PMV BLD AUTO: 7.8 FL (ref 5–10.5)
POTASSIUM REFLEX MAGNESIUM: 3 MMOL/L (ref 3.5–5.1)
RBC # BLD: 2.79 M/UL (ref 4.2–5.9)
SODIUM BLD-SCNC: 132 MMOL/L (ref 136–145)
TOTAL PROTEIN: 5.4 G/DL (ref 6.4–8.2)
WBC # BLD: 7 K/UL (ref 4–11)

## 2019-01-09 PROCEDURE — 6360000002 HC RX W HCPCS: Performed by: INTERNAL MEDICINE

## 2019-01-09 PROCEDURE — 80048 BASIC METABOLIC PNL TOTAL CA: CPT

## 2019-01-09 PROCEDURE — 1200000000 HC SEMI PRIVATE

## 2019-01-09 PROCEDURE — 83735 ASSAY OF MAGNESIUM: CPT

## 2019-01-09 PROCEDURE — 80076 HEPATIC FUNCTION PANEL: CPT

## 2019-01-09 PROCEDURE — 2580000003 HC RX 258: Performed by: INTERNAL MEDICINE

## 2019-01-09 PROCEDURE — 6370000000 HC RX 637 (ALT 250 FOR IP): Performed by: INTERNAL MEDICINE

## 2019-01-09 PROCEDURE — 85027 COMPLETE CBC AUTOMATED: CPT

## 2019-01-09 PROCEDURE — 36415 COLL VENOUS BLD VENIPUNCTURE: CPT

## 2019-01-09 PROCEDURE — C9113 INJ PANTOPRAZOLE SODIUM, VIA: HCPCS | Performed by: INTERNAL MEDICINE

## 2019-01-09 RX ORDER — MAGNESIUM SULFATE IN WATER 40 MG/ML
2 INJECTION, SOLUTION INTRAVENOUS ONCE
Status: COMPLETED | OUTPATIENT
Start: 2019-01-09 | End: 2019-01-09

## 2019-01-09 RX ORDER — POTASSIUM CHLORIDE 20 MEQ/1
20 TABLET, EXTENDED RELEASE ORAL 2 TIMES DAILY
Status: COMPLETED | OUTPATIENT
Start: 2019-01-09 | End: 2019-01-10

## 2019-01-09 RX ADMIN — LORAZEPAM 1 MG: 1 TABLET ORAL at 14:31

## 2019-01-09 RX ADMIN — MORPHINE SULFATE 2 MG: 2 INJECTION, SOLUTION INTRAMUSCULAR; INTRAVENOUS at 15:33

## 2019-01-09 RX ADMIN — MORPHINE SULFATE 2 MG: 2 INJECTION, SOLUTION INTRAMUSCULAR; INTRAVENOUS at 19:50

## 2019-01-09 RX ADMIN — PANTOPRAZOLE SODIUM 40 MG: 40 INJECTION, POWDER, FOR SOLUTION INTRAVENOUS at 08:40

## 2019-01-09 RX ADMIN — SODIUM CHLORIDE: 9 INJECTION, SOLUTION INTRAVENOUS at 08:44

## 2019-01-09 RX ADMIN — ACETAMINOPHEN 650 MG: 325 TABLET ORAL at 04:56

## 2019-01-09 RX ADMIN — Medication 10 ML: at 08:40

## 2019-01-09 RX ADMIN — ACETAMINOPHEN 650 MG: 325 TABLET ORAL at 20:21

## 2019-01-09 RX ADMIN — SODIUM CHLORIDE, PRESERVATIVE FREE 10 ML: 5 INJECTION INTRAVENOUS at 02:05

## 2019-01-09 RX ADMIN — MORPHINE SULFATE 2 MG: 2 INJECTION, SOLUTION INTRAMUSCULAR; INTRAVENOUS at 03:10

## 2019-01-09 RX ADMIN — MORPHINE SULFATE 2 MG: 2 INJECTION, SOLUTION INTRAMUSCULAR; INTRAVENOUS at 11:22

## 2019-01-09 RX ADMIN — ENOXAPARIN SODIUM 40 MG: 100 INJECTION SUBCUTANEOUS at 08:40

## 2019-01-09 RX ADMIN — MORPHINE SULFATE 2 MG: 2 INJECTION, SOLUTION INTRAMUSCULAR; INTRAVENOUS at 07:16

## 2019-01-09 RX ADMIN — ONDANSETRON 4 MG: 2 INJECTION INTRAMUSCULAR; INTRAVENOUS at 19:02

## 2019-01-09 RX ADMIN — POTASSIUM CHLORIDE 20 MEQ: 20 TABLET, EXTENDED RELEASE ORAL at 19:52

## 2019-01-09 RX ADMIN — MAGNESIUM SULFATE HEPTAHYDRATE 2 G: 40 INJECTION, SOLUTION INTRAVENOUS at 14:58

## 2019-01-09 RX ADMIN — LORAZEPAM 1 MG: 1 TABLET ORAL at 05:03

## 2019-01-09 ASSESSMENT — PAIN SCALES - GENERAL
PAINLEVEL_OUTOF10: 9
PAINLEVEL_OUTOF10: 9
PAINLEVEL_OUTOF10: 8
PAINLEVEL_OUTOF10: 9
PAINLEVEL_OUTOF10: 8
PAINLEVEL_OUTOF10: 9
PAINLEVEL_OUTOF10: 9

## 2019-01-10 LAB
ALBUMIN SERPL-MCNC: 2.3 G/DL (ref 3.4–5)
ALP BLD-CCNC: 185 U/L (ref 40–129)
ALT SERPL-CCNC: 83 U/L (ref 10–40)
ANION GAP SERPL CALCULATED.3IONS-SCNC: 15 MMOL/L (ref 3–16)
AST SERPL-CCNC: 128 U/L (ref 15–37)
BILIRUB SERPL-MCNC: 3 MG/DL (ref 0–1)
BILIRUBIN DIRECT: 2.2 MG/DL (ref 0–0.3)
BILIRUBIN, INDIRECT: 0.8 MG/DL (ref 0–1)
BUN BLDV-MCNC: <2 MG/DL (ref 7–20)
CALCIUM SERPL-MCNC: 8.1 MG/DL (ref 8.3–10.6)
CHLORIDE BLD-SCNC: 96 MMOL/L (ref 99–110)
CO2: 23 MMOL/L (ref 21–32)
CREAT SERPL-MCNC: <0.5 MG/DL (ref 0.9–1.3)
FOLATE: 14.65 NG/ML (ref 4.78–24.2)
GFR AFRICAN AMERICAN: >60
GFR NON-AFRICAN AMERICAN: >60
GLUCOSE BLD-MCNC: 109 MG/DL (ref 70–99)
HCT VFR BLD CALC: 27 % (ref 40.5–52.5)
HEMOGLOBIN: 9.2 G/DL (ref 13.5–17.5)
LACTIC ACID: 0.8 MMOL/L (ref 0.4–2)
LIPASE: 70 U/L (ref 13–60)
MAGNESIUM: 1.7 MG/DL (ref 1.8–2.4)
MCH RBC QN AUTO: 33.8 PG (ref 26–34)
MCHC RBC AUTO-ENTMCNC: 33.9 G/DL (ref 31–36)
MCV RBC AUTO: 99.6 FL (ref 80–100)
PDW BLD-RTO: 14.9 % (ref 12.4–15.4)
PLATELET # BLD: 171 K/UL (ref 135–450)
PMV BLD AUTO: 7.6 FL (ref 5–10.5)
POTASSIUM REFLEX MAGNESIUM: 2.7 MMOL/L (ref 3.5–5.1)
RBC # BLD: 2.71 M/UL (ref 4.2–5.9)
SODIUM BLD-SCNC: 134 MMOL/L (ref 136–145)
TOTAL PROTEIN: 5.3 G/DL (ref 6.4–8.2)
VITAMIN B-12: >2000 PG/ML (ref 211–911)
WBC # BLD: 7.1 K/UL (ref 4–11)

## 2019-01-10 PROCEDURE — 36415 COLL VENOUS BLD VENIPUNCTURE: CPT

## 2019-01-10 PROCEDURE — 6370000000 HC RX 637 (ALT 250 FOR IP): Performed by: INTERNAL MEDICINE

## 2019-01-10 PROCEDURE — 6360000002 HC RX W HCPCS: Performed by: INTERNAL MEDICINE

## 2019-01-10 PROCEDURE — 85027 COMPLETE CBC AUTOMATED: CPT

## 2019-01-10 PROCEDURE — 83690 ASSAY OF LIPASE: CPT

## 2019-01-10 PROCEDURE — 82746 ASSAY OF FOLIC ACID SERUM: CPT

## 2019-01-10 PROCEDURE — C9113 INJ PANTOPRAZOLE SODIUM, VIA: HCPCS | Performed by: INTERNAL MEDICINE

## 2019-01-10 PROCEDURE — 83735 ASSAY OF MAGNESIUM: CPT

## 2019-01-10 PROCEDURE — 83605 ASSAY OF LACTIC ACID: CPT

## 2019-01-10 PROCEDURE — 1200000000 HC SEMI PRIVATE

## 2019-01-10 PROCEDURE — 2580000003 HC RX 258: Performed by: INTERNAL MEDICINE

## 2019-01-10 PROCEDURE — 80076 HEPATIC FUNCTION PANEL: CPT

## 2019-01-10 PROCEDURE — 80048 BASIC METABOLIC PNL TOTAL CA: CPT

## 2019-01-10 PROCEDURE — 82607 VITAMIN B-12: CPT

## 2019-01-10 RX ORDER — MAGNESIUM SULFATE IN WATER 40 MG/ML
2 INJECTION, SOLUTION INTRAVENOUS ONCE
Status: COMPLETED | OUTPATIENT
Start: 2019-01-10 | End: 2019-01-10

## 2019-01-10 RX ORDER — POTASSIUM CHLORIDE 20 MEQ/1
20 TABLET, EXTENDED RELEASE ORAL 3 TIMES DAILY
Status: DISCONTINUED | OUTPATIENT
Start: 2019-01-10 | End: 2019-01-14 | Stop reason: HOSPADM

## 2019-01-10 RX ADMIN — MORPHINE SULFATE 2 MG: 2 INJECTION, SOLUTION INTRAMUSCULAR; INTRAVENOUS at 18:02

## 2019-01-10 RX ADMIN — POTASSIUM CHLORIDE 20 MEQ: 20 TABLET, EXTENDED RELEASE ORAL at 19:47

## 2019-01-10 RX ADMIN — Medication 10 ML: at 08:33

## 2019-01-10 RX ADMIN — ACETAMINOPHEN 650 MG: 325 TABLET ORAL at 21:01

## 2019-01-10 RX ADMIN — MAGNESIUM SULFATE HEPTAHYDRATE 2 G: 40 INJECTION, SOLUTION INTRAVENOUS at 12:04

## 2019-01-10 RX ADMIN — ACETAMINOPHEN 650 MG: 325 TABLET ORAL at 08:33

## 2019-01-10 RX ADMIN — LORAZEPAM 1 MG: 1 TABLET ORAL at 08:33

## 2019-01-10 RX ADMIN — ONDANSETRON 4 MG: 2 INJECTION INTRAMUSCULAR; INTRAVENOUS at 14:32

## 2019-01-10 RX ADMIN — SODIUM CHLORIDE: 9 INJECTION, SOLUTION INTRAVENOUS at 04:10

## 2019-01-10 RX ADMIN — LORAZEPAM 1 MG: 1 TABLET ORAL at 00:07

## 2019-01-10 RX ADMIN — LORAZEPAM 1 MG: 1 TABLET ORAL at 15:48

## 2019-01-10 RX ADMIN — MORPHINE SULFATE 2 MG: 2 INJECTION, SOLUTION INTRAMUSCULAR; INTRAVENOUS at 05:45

## 2019-01-10 RX ADMIN — LORAZEPAM 1 MG: 1 TABLET ORAL at 11:50

## 2019-01-10 RX ADMIN — MORPHINE SULFATE 2 MG: 2 INJECTION, SOLUTION INTRAMUSCULAR; INTRAVENOUS at 11:50

## 2019-01-10 RX ADMIN — POTASSIUM CHLORIDE 20 MEQ: 20 TABLET, EXTENDED RELEASE ORAL at 08:33

## 2019-01-10 RX ADMIN — LORAZEPAM 1 MG: 1 TABLET ORAL at 19:47

## 2019-01-10 RX ADMIN — SODIUM CHLORIDE: 9 INJECTION, SOLUTION INTRAVENOUS at 19:48

## 2019-01-10 RX ADMIN — POTASSIUM CHLORIDE 20 MEQ: 20 TABLET, EXTENDED RELEASE ORAL at 14:32

## 2019-01-10 RX ADMIN — PANTOPRAZOLE SODIUM 40 MG: 40 INJECTION, POWDER, FOR SOLUTION INTRAVENOUS at 08:33

## 2019-01-10 RX ADMIN — ENOXAPARIN SODIUM 40 MG: 100 INJECTION SUBCUTANEOUS at 08:33

## 2019-01-10 ASSESSMENT — PAIN SCALES - GENERAL
PAINLEVEL_OUTOF10: 6
PAINLEVEL_OUTOF10: 7
PAINLEVEL_OUTOF10: 5
PAINLEVEL_OUTOF10: 7
PAINLEVEL_OUTOF10: 7

## 2019-01-11 ENCOUNTER — APPOINTMENT (OUTPATIENT)
Dept: GENERAL RADIOLOGY | Age: 44
DRG: 439 | End: 2019-01-11
Payer: MEDICARE

## 2019-01-11 LAB
ALBUMIN SERPL-MCNC: 2.2 G/DL (ref 3.4–5)
ALP BLD-CCNC: 183 U/L (ref 40–129)
ALT SERPL-CCNC: 88 U/L (ref 10–40)
ANION GAP SERPL CALCULATED.3IONS-SCNC: 13 MMOL/L (ref 3–16)
AST SERPL-CCNC: 113 U/L (ref 15–37)
BILIRUB SERPL-MCNC: 2 MG/DL (ref 0–1)
BILIRUBIN DIRECT: 1.3 MG/DL (ref 0–0.3)
BILIRUBIN, INDIRECT: 0.7 MG/DL (ref 0–1)
BUN BLDV-MCNC: <2 MG/DL (ref 7–20)
CALCIUM SERPL-MCNC: 8 MG/DL (ref 8.3–10.6)
CHLORIDE BLD-SCNC: 102 MMOL/L (ref 99–110)
CO2: 23 MMOL/L (ref 21–32)
CREAT SERPL-MCNC: <0.5 MG/DL (ref 0.9–1.3)
GFR AFRICAN AMERICAN: >60
GFR NON-AFRICAN AMERICAN: >60
GLUCOSE BLD-MCNC: 98 MG/DL (ref 70–99)
HCT VFR BLD CALC: 27.5 % (ref 40.5–52.5)
HEMOGLOBIN: 9.3 G/DL (ref 13.5–17.5)
MAGNESIUM: 1.9 MG/DL (ref 1.8–2.4)
MCH RBC QN AUTO: 34.1 PG (ref 26–34)
MCHC RBC AUTO-ENTMCNC: 33.7 G/DL (ref 31–36)
MCV RBC AUTO: 101.1 FL (ref 80–100)
PDW BLD-RTO: 15.3 % (ref 12.4–15.4)
PLATELET # BLD: 231 K/UL (ref 135–450)
PMV BLD AUTO: 7.7 FL (ref 5–10.5)
POTASSIUM REFLEX MAGNESIUM: 2.9 MMOL/L (ref 3.5–5.1)
RBC # BLD: 2.73 M/UL (ref 4.2–5.9)
SODIUM BLD-SCNC: 138 MMOL/L (ref 136–145)
TOTAL PROTEIN: 5.4 G/DL (ref 6.4–8.2)
WBC # BLD: 9.3 K/UL (ref 4–11)

## 2019-01-11 PROCEDURE — 6370000000 HC RX 637 (ALT 250 FOR IP): Performed by: INTERNAL MEDICINE

## 2019-01-11 PROCEDURE — 6360000002 HC RX W HCPCS: Performed by: INTERNAL MEDICINE

## 2019-01-11 PROCEDURE — 36415 COLL VENOUS BLD VENIPUNCTURE: CPT

## 2019-01-11 PROCEDURE — 2580000003 HC RX 258: Performed by: INTERNAL MEDICINE

## 2019-01-11 PROCEDURE — 1200000000 HC SEMI PRIVATE

## 2019-01-11 PROCEDURE — 74022 RADEX COMPL AQT ABD SERIES: CPT

## 2019-01-11 PROCEDURE — 83735 ASSAY OF MAGNESIUM: CPT

## 2019-01-11 PROCEDURE — 85027 COMPLETE CBC AUTOMATED: CPT

## 2019-01-11 PROCEDURE — C9113 INJ PANTOPRAZOLE SODIUM, VIA: HCPCS | Performed by: INTERNAL MEDICINE

## 2019-01-11 PROCEDURE — 80076 HEPATIC FUNCTION PANEL: CPT

## 2019-01-11 PROCEDURE — 80048 BASIC METABOLIC PNL TOTAL CA: CPT

## 2019-01-11 RX ORDER — CLONAZEPAM 1 MG/1
2 TABLET ORAL 2 TIMES DAILY
Status: DISCONTINUED | OUTPATIENT
Start: 2019-01-11 | End: 2019-01-14 | Stop reason: HOSPADM

## 2019-01-11 RX ORDER — OMEPRAZOLE 20 MG/1
40 CAPSULE, DELAYED RELEASE ORAL DAILY
COMMUNITY

## 2019-01-11 RX ORDER — CLONAZEPAM 2 MG/1
2 TABLET ORAL 2 TIMES DAILY
Status: ON HOLD | COMMUNITY
End: 2019-01-14

## 2019-01-11 RX ORDER — POTASSIUM CHLORIDE 20 MEQ/1
20 TABLET, EXTENDED RELEASE ORAL ONCE
Status: COMPLETED | OUTPATIENT
Start: 2019-01-11 | End: 2019-01-11

## 2019-01-11 RX ADMIN — SODIUM CHLORIDE: 9 INJECTION, SOLUTION INTRAVENOUS at 18:57

## 2019-01-11 RX ADMIN — CLONAZEPAM 2 MG: 1 TABLET ORAL at 16:32

## 2019-01-11 RX ADMIN — POTASSIUM CHLORIDE 20 MEQ: 20 TABLET, EXTENDED RELEASE ORAL at 11:02

## 2019-01-11 RX ADMIN — PANTOPRAZOLE SODIUM 40 MG: 40 INJECTION, POWDER, FOR SOLUTION INTRAVENOUS at 11:02

## 2019-01-11 RX ADMIN — Medication 10 ML: at 19:41

## 2019-01-11 RX ADMIN — ENOXAPARIN SODIUM 40 MG: 100 INJECTION SUBCUTANEOUS at 11:02

## 2019-01-11 RX ADMIN — SODIUM CHLORIDE: 9 INJECTION, SOLUTION INTRAVENOUS at 19:39

## 2019-01-11 RX ADMIN — POTASSIUM CHLORIDE 20 MEQ: 20 TABLET, EXTENDED RELEASE ORAL at 19:40

## 2019-01-11 RX ADMIN — MORPHINE SULFATE 2 MG: 2 INJECTION, SOLUTION INTRAMUSCULAR; INTRAVENOUS at 15:52

## 2019-01-11 RX ADMIN — POTASSIUM CHLORIDE 20 MEQ: 20 TABLET, EXTENDED RELEASE ORAL at 15:17

## 2019-01-11 RX ADMIN — POTASSIUM CHLORIDE 20 MEQ: 20 TABLET, EXTENDED RELEASE ORAL at 06:51

## 2019-01-11 RX ADMIN — ACETAMINOPHEN 650 MG: 325 TABLET ORAL at 21:10

## 2019-01-11 RX ADMIN — MORPHINE SULFATE 2 MG: 2 INJECTION, SOLUTION INTRAMUSCULAR; INTRAVENOUS at 06:53

## 2019-01-11 RX ADMIN — MORPHINE SULFATE 2 MG: 2 INJECTION, SOLUTION INTRAMUSCULAR; INTRAVENOUS at 19:56

## 2019-01-11 RX ADMIN — Medication 10 ML: at 11:02

## 2019-01-11 RX ADMIN — MORPHINE SULFATE 2 MG: 2 INJECTION, SOLUTION INTRAMUSCULAR; INTRAVENOUS at 11:18

## 2019-01-11 ASSESSMENT — PAIN SCALES - GENERAL
PAINLEVEL_OUTOF10: 8
PAINLEVEL_OUTOF10: 6
PAINLEVEL_OUTOF10: 8

## 2019-01-12 LAB
ALBUMIN SERPL-MCNC: 2.5 G/DL (ref 3.4–5)
ALP BLD-CCNC: 166 U/L (ref 40–129)
ALT SERPL-CCNC: 78 U/L (ref 10–40)
ANION GAP SERPL CALCULATED.3IONS-SCNC: 12 MMOL/L (ref 3–16)
AST SERPL-CCNC: 95 U/L (ref 15–37)
BILIRUB SERPL-MCNC: 1.7 MG/DL (ref 0–1)
BILIRUBIN DIRECT: 1 MG/DL (ref 0–0.3)
BILIRUBIN, INDIRECT: 0.7 MG/DL (ref 0–1)
BUN BLDV-MCNC: 3 MG/DL (ref 7–20)
CALCIUM SERPL-MCNC: 8.1 MG/DL (ref 8.3–10.6)
CHLORIDE BLD-SCNC: 103 MMOL/L (ref 99–110)
CO2: 23 MMOL/L (ref 21–32)
CREAT SERPL-MCNC: <0.5 MG/DL (ref 0.9–1.3)
GFR AFRICAN AMERICAN: >60
GFR NON-AFRICAN AMERICAN: >60
GLUCOSE BLD-MCNC: 95 MG/DL (ref 70–99)
HCT VFR BLD CALC: 26 % (ref 40.5–52.5)
HEMOGLOBIN: 8.8 G/DL (ref 13.5–17.5)
LIPASE: 133 U/L (ref 13–60)
MAGNESIUM: 1.6 MG/DL (ref 1.8–2.4)
MCH RBC QN AUTO: 33.8 PG (ref 26–34)
MCHC RBC AUTO-ENTMCNC: 33.9 G/DL (ref 31–36)
MCV RBC AUTO: 99.7 FL (ref 80–100)
PDW BLD-RTO: 15 % (ref 12.4–15.4)
PLATELET # BLD: 313 K/UL (ref 135–450)
PMV BLD AUTO: 7.7 FL (ref 5–10.5)
POTASSIUM REFLEX MAGNESIUM: 3.1 MMOL/L (ref 3.5–5.1)
RBC # BLD: 2.61 M/UL (ref 4.2–5.9)
SODIUM BLD-SCNC: 138 MMOL/L (ref 136–145)
TOTAL PROTEIN: 5.3 G/DL (ref 6.4–8.2)
WBC # BLD: 10.7 K/UL (ref 4–11)

## 2019-01-12 PROCEDURE — 83735 ASSAY OF MAGNESIUM: CPT

## 2019-01-12 PROCEDURE — 36415 COLL VENOUS BLD VENIPUNCTURE: CPT

## 2019-01-12 PROCEDURE — 2580000003 HC RX 258: Performed by: INTERNAL MEDICINE

## 2019-01-12 PROCEDURE — 6360000002 HC RX W HCPCS: Performed by: INTERNAL MEDICINE

## 2019-01-12 PROCEDURE — 6370000000 HC RX 637 (ALT 250 FOR IP): Performed by: INTERNAL MEDICINE

## 2019-01-12 PROCEDURE — 80076 HEPATIC FUNCTION PANEL: CPT

## 2019-01-12 PROCEDURE — 85027 COMPLETE CBC AUTOMATED: CPT

## 2019-01-12 PROCEDURE — C9113 INJ PANTOPRAZOLE SODIUM, VIA: HCPCS | Performed by: INTERNAL MEDICINE

## 2019-01-12 PROCEDURE — 1200000000 HC SEMI PRIVATE

## 2019-01-12 PROCEDURE — 80048 BASIC METABOLIC PNL TOTAL CA: CPT

## 2019-01-12 PROCEDURE — 83690 ASSAY OF LIPASE: CPT

## 2019-01-12 RX ORDER — TRAMADOL HYDROCHLORIDE 50 MG/1
50 TABLET ORAL EVERY 8 HOURS PRN
Status: DISCONTINUED | OUTPATIENT
Start: 2019-01-12 | End: 2019-01-14 | Stop reason: HOSPADM

## 2019-01-12 RX ORDER — MAGNESIUM SULFATE IN WATER 40 MG/ML
2 INJECTION, SOLUTION INTRAVENOUS ONCE
Status: COMPLETED | OUTPATIENT
Start: 2019-01-12 | End: 2019-01-12

## 2019-01-12 RX ADMIN — MAGNESIUM SULFATE HEPTAHYDRATE 2 G: 40 INJECTION, SOLUTION INTRAVENOUS at 12:59

## 2019-01-12 RX ADMIN — TRAMADOL HYDROCHLORIDE 50 MG: 50 TABLET, FILM COATED ORAL at 18:16

## 2019-01-12 RX ADMIN — POTASSIUM CHLORIDE 20 MEQ: 20 TABLET, EXTENDED RELEASE ORAL at 20:28

## 2019-01-12 RX ADMIN — PANTOPRAZOLE SODIUM 40 MG: 40 INJECTION, POWDER, FOR SOLUTION INTRAVENOUS at 09:24

## 2019-01-12 RX ADMIN — MORPHINE SULFATE 2 MG: 2 INJECTION, SOLUTION INTRAMUSCULAR; INTRAVENOUS at 13:05

## 2019-01-12 RX ADMIN — CLONAZEPAM 2 MG: 1 TABLET ORAL at 20:28

## 2019-01-12 RX ADMIN — POTASSIUM CHLORIDE 20 MEQ: 20 TABLET, EXTENDED RELEASE ORAL at 14:30

## 2019-01-12 RX ADMIN — POTASSIUM CHLORIDE 20 MEQ: 20 TABLET, EXTENDED RELEASE ORAL at 09:24

## 2019-01-12 RX ADMIN — ENOXAPARIN SODIUM 40 MG: 100 INJECTION SUBCUTANEOUS at 09:25

## 2019-01-12 RX ADMIN — ACETAMINOPHEN 650 MG: 325 TABLET ORAL at 09:25

## 2019-01-12 RX ADMIN — ACETAMINOPHEN 650 MG: 325 TABLET ORAL at 18:16

## 2019-01-12 RX ADMIN — LORAZEPAM 1 MG: 1 TABLET ORAL at 15:35

## 2019-01-12 RX ADMIN — ACETAMINOPHEN 650 MG: 325 TABLET ORAL at 22:17

## 2019-01-12 RX ADMIN — CLONAZEPAM 2 MG: 1 TABLET ORAL at 09:24

## 2019-01-12 RX ADMIN — Medication 10 ML: at 09:24

## 2019-01-12 RX ADMIN — SODIUM CHLORIDE: 9 INJECTION, SOLUTION INTRAVENOUS at 20:28

## 2019-01-12 RX ADMIN — MORPHINE SULFATE 2 MG: 2 INJECTION, SOLUTION INTRAMUSCULAR; INTRAVENOUS at 05:24

## 2019-01-12 RX ADMIN — SODIUM CHLORIDE: 9 INJECTION, SOLUTION INTRAVENOUS at 09:25

## 2019-01-12 ASSESSMENT — PAIN SCALES - GENERAL
PAINLEVEL_OUTOF10: 8
PAINLEVEL_OUTOF10: 7
PAINLEVEL_OUTOF10: 7
PAINLEVEL_OUTOF10: 8
PAINLEVEL_OUTOF10: 7
PAINLEVEL_OUTOF10: 7

## 2019-01-13 ENCOUNTER — APPOINTMENT (OUTPATIENT)
Dept: CT IMAGING | Age: 44
DRG: 439 | End: 2019-01-13
Payer: MEDICARE

## 2019-01-13 LAB
ALBUMIN SERPL-MCNC: 2.3 G/DL (ref 3.4–5)
ALP BLD-CCNC: 140 U/L (ref 40–129)
ALT SERPL-CCNC: 66 U/L (ref 10–40)
ANION GAP SERPL CALCULATED.3IONS-SCNC: 10 MMOL/L (ref 3–16)
AST SERPL-CCNC: 74 U/L (ref 15–37)
BILIRUB SERPL-MCNC: 1.2 MG/DL (ref 0–1)
BILIRUBIN DIRECT: 0.7 MG/DL (ref 0–0.3)
BILIRUBIN, INDIRECT: 0.5 MG/DL (ref 0–1)
BUN BLDV-MCNC: 3 MG/DL (ref 7–20)
CALCIUM SERPL-MCNC: 8.2 MG/DL (ref 8.3–10.6)
CHLORIDE BLD-SCNC: 104 MMOL/L (ref 99–110)
CO2: 22 MMOL/L (ref 21–32)
CREAT SERPL-MCNC: <0.5 MG/DL (ref 0.9–1.3)
GFR AFRICAN AMERICAN: >60
GFR NON-AFRICAN AMERICAN: >60
GLUCOSE BLD-MCNC: 104 MG/DL (ref 70–99)
HCT VFR BLD CALC: 24 % (ref 40.5–52.5)
HEMOGLOBIN: 8.2 G/DL (ref 13.5–17.5)
LIPASE: 133 U/L (ref 13–60)
MAGNESIUM: 1.8 MG/DL (ref 1.8–2.4)
MCH RBC QN AUTO: 34.2 PG (ref 26–34)
MCHC RBC AUTO-ENTMCNC: 34.1 G/DL (ref 31–36)
MCV RBC AUTO: 100.3 FL (ref 80–100)
PDW BLD-RTO: 15.1 % (ref 12.4–15.4)
PLATELET # BLD: 392 K/UL (ref 135–450)
PMV BLD AUTO: 7.4 FL (ref 5–10.5)
POTASSIUM REFLEX MAGNESIUM: 3.2 MMOL/L (ref 3.5–5.1)
RBC # BLD: 2.39 M/UL (ref 4.2–5.9)
SODIUM BLD-SCNC: 136 MMOL/L (ref 136–145)
TOTAL PROTEIN: 5 G/DL (ref 6.4–8.2)
WBC # BLD: 11.9 K/UL (ref 4–11)

## 2019-01-13 PROCEDURE — 83735 ASSAY OF MAGNESIUM: CPT

## 2019-01-13 PROCEDURE — 85027 COMPLETE CBC AUTOMATED: CPT

## 2019-01-13 PROCEDURE — 6360000004 HC RX CONTRAST MEDICATION: Performed by: INTERNAL MEDICINE

## 2019-01-13 PROCEDURE — 2580000003 HC RX 258: Performed by: INTERNAL MEDICINE

## 2019-01-13 PROCEDURE — 80076 HEPATIC FUNCTION PANEL: CPT

## 2019-01-13 PROCEDURE — 6360000002 HC RX W HCPCS: Performed by: INTERNAL MEDICINE

## 2019-01-13 PROCEDURE — 6370000000 HC RX 637 (ALT 250 FOR IP): Performed by: INTERNAL MEDICINE

## 2019-01-13 PROCEDURE — 74177 CT ABD & PELVIS W/CONTRAST: CPT

## 2019-01-13 PROCEDURE — C9113 INJ PANTOPRAZOLE SODIUM, VIA: HCPCS | Performed by: INTERNAL MEDICINE

## 2019-01-13 PROCEDURE — 1200000000 HC SEMI PRIVATE

## 2019-01-13 PROCEDURE — 36415 COLL VENOUS BLD VENIPUNCTURE: CPT

## 2019-01-13 PROCEDURE — 83690 ASSAY OF LIPASE: CPT

## 2019-01-13 PROCEDURE — 80048 BASIC METABOLIC PNL TOTAL CA: CPT

## 2019-01-13 RX ADMIN — ACETAMINOPHEN 650 MG: 325 TABLET ORAL at 03:41

## 2019-01-13 RX ADMIN — LORAZEPAM 1 MG: 1 TABLET ORAL at 15:10

## 2019-01-13 RX ADMIN — POTASSIUM CHLORIDE 20 MEQ: 20 TABLET, EXTENDED RELEASE ORAL at 09:07

## 2019-01-13 RX ADMIN — TRAMADOL HYDROCHLORIDE 50 MG: 50 TABLET, FILM COATED ORAL at 11:16

## 2019-01-13 RX ADMIN — ACETAMINOPHEN 650 MG: 325 TABLET ORAL at 09:07

## 2019-01-13 RX ADMIN — Medication 10 ML: at 20:35

## 2019-01-13 RX ADMIN — TRAMADOL HYDROCHLORIDE 50 MG: 50 TABLET, FILM COATED ORAL at 22:00

## 2019-01-13 RX ADMIN — ENOXAPARIN SODIUM 40 MG: 100 INJECTION SUBCUTANEOUS at 09:07

## 2019-01-13 RX ADMIN — SODIUM CHLORIDE: 9 INJECTION, SOLUTION INTRAVENOUS at 20:22

## 2019-01-13 RX ADMIN — POTASSIUM CHLORIDE 20 MEQ: 20 TABLET, EXTENDED RELEASE ORAL at 20:34

## 2019-01-13 RX ADMIN — Medication 10 ML: at 09:07

## 2019-01-13 RX ADMIN — CLONAZEPAM 2 MG: 1 TABLET ORAL at 20:35

## 2019-01-13 RX ADMIN — CLONAZEPAM 2 MG: 1 TABLET ORAL at 09:07

## 2019-01-13 RX ADMIN — IOPAMIDOL 75 ML: 755 INJECTION, SOLUTION INTRAVENOUS at 15:42

## 2019-01-13 RX ADMIN — TRAMADOL HYDROCHLORIDE 50 MG: 50 TABLET, FILM COATED ORAL at 02:22

## 2019-01-13 RX ADMIN — PANTOPRAZOLE SODIUM 40 MG: 40 INJECTION, POWDER, FOR SOLUTION INTRAVENOUS at 09:07

## 2019-01-13 RX ADMIN — POTASSIUM CHLORIDE 20 MEQ: 20 TABLET, EXTENDED RELEASE ORAL at 15:49

## 2019-01-13 ASSESSMENT — PAIN SCALES - GENERAL
PAINLEVEL_OUTOF10: 7
PAINLEVEL_OUTOF10: 6
PAINLEVEL_OUTOF10: 8
PAINLEVEL_OUTOF10: 10
PAINLEVEL_OUTOF10: 8

## 2019-01-14 VITALS
RESPIRATION RATE: 16 BRPM | SYSTOLIC BLOOD PRESSURE: 136 MMHG | DIASTOLIC BLOOD PRESSURE: 87 MMHG | OXYGEN SATURATION: 98 % | TEMPERATURE: 98.2 F | WEIGHT: 160 LBS | BODY MASS INDEX: 21.67 KG/M2 | HEART RATE: 86 BPM | HEIGHT: 72 IN

## 2019-01-14 LAB
ALBUMIN SERPL-MCNC: 2.2 G/DL (ref 3.4–5)
ALP BLD-CCNC: 147 U/L (ref 40–129)
ALT SERPL-CCNC: 59 U/L (ref 10–40)
ANION GAP SERPL CALCULATED.3IONS-SCNC: 12 MMOL/L (ref 3–16)
AST SERPL-CCNC: 51 U/L (ref 15–37)
BILIRUB SERPL-MCNC: 1.2 MG/DL (ref 0–1)
BILIRUBIN DIRECT: 0.7 MG/DL (ref 0–0.3)
BILIRUBIN, INDIRECT: 0.5 MG/DL (ref 0–1)
BUN BLDV-MCNC: 3 MG/DL (ref 7–20)
CALCIUM SERPL-MCNC: 8 MG/DL (ref 8.3–10.6)
CHLORIDE BLD-SCNC: 102 MMOL/L (ref 99–110)
CO2: 23 MMOL/L (ref 21–32)
CREAT SERPL-MCNC: <0.5 MG/DL (ref 0.9–1.3)
GFR AFRICAN AMERICAN: >60
GFR NON-AFRICAN AMERICAN: >60
GLUCOSE BLD-MCNC: 114 MG/DL (ref 70–99)
HCT VFR BLD CALC: 25.1 % (ref 40.5–52.5)
HEMOGLOBIN: 8.4 G/DL (ref 13.5–17.5)
MAGNESIUM: 1.6 MG/DL (ref 1.8–2.4)
MCH RBC QN AUTO: 33.3 PG (ref 26–34)
MCHC RBC AUTO-ENTMCNC: 33.4 G/DL (ref 31–36)
MCV RBC AUTO: 99.9 FL (ref 80–100)
PDW BLD-RTO: 15.4 % (ref 12.4–15.4)
PLATELET # BLD: 515 K/UL (ref 135–450)
PMV BLD AUTO: 7.8 FL (ref 5–10.5)
POTASSIUM REFLEX MAGNESIUM: 3 MMOL/L (ref 3.5–5.1)
RBC # BLD: 2.51 M/UL (ref 4.2–5.9)
SODIUM BLD-SCNC: 137 MMOL/L (ref 136–145)
TOTAL PROTEIN: 4.9 G/DL (ref 6.4–8.2)
WBC # BLD: 11.7 K/UL (ref 4–11)

## 2019-01-14 PROCEDURE — C9113 INJ PANTOPRAZOLE SODIUM, VIA: HCPCS | Performed by: INTERNAL MEDICINE

## 2019-01-14 PROCEDURE — 83735 ASSAY OF MAGNESIUM: CPT

## 2019-01-14 PROCEDURE — 36415 COLL VENOUS BLD VENIPUNCTURE: CPT

## 2019-01-14 PROCEDURE — 80048 BASIC METABOLIC PNL TOTAL CA: CPT

## 2019-01-14 PROCEDURE — 2580000003 HC RX 258: Performed by: INTERNAL MEDICINE

## 2019-01-14 PROCEDURE — 6370000000 HC RX 637 (ALT 250 FOR IP): Performed by: INTERNAL MEDICINE

## 2019-01-14 PROCEDURE — 6360000002 HC RX W HCPCS: Performed by: INTERNAL MEDICINE

## 2019-01-14 PROCEDURE — 80076 HEPATIC FUNCTION PANEL: CPT

## 2019-01-14 PROCEDURE — 85027 COMPLETE CBC AUTOMATED: CPT

## 2019-01-14 RX ORDER — CLONAZEPAM 2 MG/1
2 TABLET ORAL 2 TIMES DAILY PRN
Qty: 6 TABLET | Refills: 0 | Status: ON HOLD | OUTPATIENT
Start: 2019-01-14 | End: 2020-10-28 | Stop reason: ALTCHOICE

## 2019-01-14 RX ORDER — TRAMADOL HYDROCHLORIDE 50 MG/1
50 TABLET ORAL EVERY 8 HOURS PRN
Qty: 15 TABLET | Refills: 0 | Status: SHIPPED | OUTPATIENT
Start: 2019-01-14 | End: 2019-01-19

## 2019-01-14 RX ORDER — CLONAZEPAM 2 MG/1
2 TABLET ORAL 2 TIMES DAILY PRN
Qty: 60 TABLET | Refills: 3 | Status: SHIPPED | OUTPATIENT
Start: 2019-01-14 | End: 2019-01-14

## 2019-01-14 RX ADMIN — POTASSIUM CHLORIDE 20 MEQ: 20 TABLET, EXTENDED RELEASE ORAL at 09:24

## 2019-01-14 RX ADMIN — CLONAZEPAM 2 MG: 1 TABLET ORAL at 09:24

## 2019-01-14 RX ADMIN — SODIUM CHLORIDE: 9 INJECTION, SOLUTION INTRAVENOUS at 14:20

## 2019-01-14 RX ADMIN — Medication 10 ML: at 09:24

## 2019-01-14 RX ADMIN — PANTOPRAZOLE SODIUM 40 MG: 40 INJECTION, POWDER, FOR SOLUTION INTRAVENOUS at 09:24

## 2019-01-14 RX ADMIN — ENOXAPARIN SODIUM 40 MG: 100 INJECTION SUBCUTANEOUS at 09:24

## 2019-01-14 RX ADMIN — TRAMADOL HYDROCHLORIDE 50 MG: 50 TABLET, FILM COATED ORAL at 05:59

## 2019-01-14 RX ADMIN — SODIUM CHLORIDE: 9 INJECTION, SOLUTION INTRAVENOUS at 05:59

## 2019-01-14 RX ADMIN — POTASSIUM CHLORIDE 20 MEQ: 20 TABLET, EXTENDED RELEASE ORAL at 14:20

## 2019-01-14 RX ADMIN — TRAMADOL HYDROCHLORIDE 50 MG: 50 TABLET, FILM COATED ORAL at 14:20

## 2019-01-14 ASSESSMENT — PAIN SCALES - GENERAL
PAINLEVEL_OUTOF10: 6
PAINLEVEL_OUTOF10: 4
PAINLEVEL_OUTOF10: 4

## 2019-12-25 ENCOUNTER — HOSPITAL ENCOUNTER (EMERGENCY)
Age: 44
Discharge: HOME | End: 2019-12-25
Payer: MEDICARE

## 2019-12-25 ENCOUNTER — APPOINTMENT (OUTPATIENT)
Dept: GENERAL RADIOLOGY | Age: 44
End: 2019-12-25
Payer: MEDICARE

## 2019-12-25 VITALS
OXYGEN SATURATION: 97 % | TEMPERATURE: 97.8 F | WEIGHT: 155 LBS | DIASTOLIC BLOOD PRESSURE: 88 MMHG | HEART RATE: 112 BPM | BODY MASS INDEX: 20.99 KG/M2 | HEIGHT: 72 IN | RESPIRATION RATE: 18 BRPM | SYSTOLIC BLOOD PRESSURE: 117 MMHG

## 2019-12-25 DIAGNOSIS — F41.1 ANXIETY STATE: ICD-10-CM

## 2019-12-25 DIAGNOSIS — Z76.0 ENCOUNTER FOR MEDICATION REFILL: Primary | ICD-10-CM

## 2019-12-25 LAB
A/G RATIO: 1.4 (ref 1.1–2.2)
ACETAMINOPHEN LEVEL: <5 UG/ML (ref 10–30)
ALBUMIN SERPL-MCNC: 4 G/DL (ref 3.4–5)
ALP BLD-CCNC: 83 U/L (ref 40–129)
ALT SERPL-CCNC: 30 U/L (ref 10–40)
AMPHETAMINE SCREEN, URINE: ABNORMAL
ANION GAP SERPL CALCULATED.3IONS-SCNC: 13 MMOL/L (ref 3–16)
AST SERPL-CCNC: 30 U/L (ref 15–37)
BARBITURATE SCREEN URINE: ABNORMAL
BASOPHILS ABSOLUTE: 0 K/UL (ref 0–0.2)
BASOPHILS RELATIVE PERCENT: 0.4 %
BENZODIAZEPINE SCREEN, URINE: ABNORMAL
BILIRUB SERPL-MCNC: <0.2 MG/DL (ref 0–1)
BUN BLDV-MCNC: 4 MG/DL (ref 7–20)
CALCIUM SERPL-MCNC: 10.1 MG/DL (ref 8.3–10.6)
CANNABINOID SCREEN URINE: POSITIVE
CHLORIDE BLD-SCNC: 101 MMOL/L (ref 99–110)
CO2: 22 MMOL/L (ref 21–32)
COCAINE METABOLITE SCREEN URINE: ABNORMAL
CREAT SERPL-MCNC: 0.6 MG/DL (ref 0.9–1.3)
EOSINOPHILS ABSOLUTE: 0 K/UL (ref 0–0.6)
EOSINOPHILS RELATIVE PERCENT: 0.4 %
ETHANOL: 126 MG/DL (ref 0–0.08)
GFR AFRICAN AMERICAN: >60
GFR NON-AFRICAN AMERICAN: >60
GLOBULIN: 2.9 G/DL
GLUCOSE BLD-MCNC: 110 MG/DL (ref 70–99)
HCT VFR BLD CALC: 44.7 % (ref 40.5–52.5)
HEMOGLOBIN: 15.4 G/DL (ref 13.5–17.5)
LYMPHOCYTES ABSOLUTE: 1.2 K/UL (ref 1–5.1)
LYMPHOCYTES RELATIVE PERCENT: 14.9 %
Lab: ABNORMAL
MCH RBC QN AUTO: 32.2 PG (ref 26–34)
MCHC RBC AUTO-ENTMCNC: 34.5 G/DL (ref 31–36)
MCV RBC AUTO: 93.4 FL (ref 80–100)
METHADONE SCREEN, URINE: ABNORMAL
MONOCYTES ABSOLUTE: 0.6 K/UL (ref 0–1.3)
MONOCYTES RELATIVE PERCENT: 7.6 %
NEUTROPHILS ABSOLUTE: 6.1 K/UL (ref 1.7–7.7)
NEUTROPHILS RELATIVE PERCENT: 76.7 %
OPIATE SCREEN URINE: ABNORMAL
OXYCODONE URINE: ABNORMAL
PDW BLD-RTO: 13.9 % (ref 12.4–15.4)
PH UA: 5
PHENCYCLIDINE SCREEN URINE: ABNORMAL
PLATELET # BLD: 268 K/UL (ref 135–450)
PMV BLD AUTO: 7.5 FL (ref 5–10.5)
POTASSIUM REFLEX MAGNESIUM: 4 MMOL/L (ref 3.5–5.1)
PROPOXYPHENE SCREEN: ABNORMAL
RBC # BLD: 4.79 M/UL (ref 4.2–5.9)
SALICYLATE, SERUM: <0.3 MG/DL (ref 15–30)
SODIUM BLD-SCNC: 136 MMOL/L (ref 136–145)
TOTAL PROTEIN: 6.9 G/DL (ref 6.4–8.2)
WBC # BLD: 7.9 K/UL (ref 4–11)

## 2019-12-25 PROCEDURE — G0480 DRUG TEST DEF 1-7 CLASSES: HCPCS

## 2019-12-25 PROCEDURE — 80053 COMPREHEN METABOLIC PANEL: CPT

## 2019-12-25 PROCEDURE — 85025 COMPLETE CBC W/AUTO DIFF WBC: CPT

## 2019-12-25 PROCEDURE — 80307 DRUG TEST PRSMV CHEM ANLYZR: CPT

## 2019-12-25 PROCEDURE — 6370000000 HC RX 637 (ALT 250 FOR IP): Performed by: EMERGENCY MEDICINE

## 2019-12-25 PROCEDURE — 99284 EMERGENCY DEPT VISIT MOD MDM: CPT

## 2019-12-25 PROCEDURE — 71046 X-RAY EXAM CHEST 2 VIEWS: CPT

## 2019-12-25 RX ORDER — QUETIAPINE FUMARATE 25 MG/1
50 TABLET, FILM COATED ORAL ONCE
Status: COMPLETED | OUTPATIENT
Start: 2019-12-25 | End: 2019-12-25

## 2019-12-25 RX ORDER — QUETIAPINE FUMARATE 50 MG/1
50 TABLET, FILM COATED ORAL 3 TIMES DAILY
Qty: 42 TABLET | Refills: 0 | Status: SHIPPED | OUTPATIENT
Start: 2019-12-25 | End: 2020-11-05

## 2019-12-25 RX ORDER — HYDROXYZINE PAMOATE 25 MG/1
25 CAPSULE ORAL ONCE
Status: DISCONTINUED | OUTPATIENT
Start: 2019-12-25 | End: 2019-12-25

## 2019-12-25 RX ADMIN — QUETIAPINE FUMARATE 50 MG: 25 TABLET ORAL at 22:42

## 2020-07-20 ENCOUNTER — HOSPITAL ENCOUNTER (OUTPATIENT)
Dept: ULTRASOUND IMAGING | Age: 45
Discharge: HOME | End: 2020-07-20
Payer: MEDICARE

## 2020-07-20 PROCEDURE — 76700 US EXAM ABDOM COMPLETE: CPT

## 2020-08-06 ENCOUNTER — HOSPITAL ENCOUNTER (OUTPATIENT)
Age: 45
Discharge: HOME | End: 2020-08-06
Payer: MEDICARE

## 2020-08-06 LAB
ALBUMIN SERPL-MCNC: 4 G/DL (ref 3.4–5)
ALP BLD-CCNC: 90 U/L (ref 40–129)
ALT SERPL-CCNC: 31 U/L (ref 10–40)
ANION GAP SERPL CALCULATED.3IONS-SCNC: 16 MMOL/L (ref 3–16)
AST SERPL-CCNC: 42 U/L (ref 15–37)
BASOPHILS ABSOLUTE: 0 K/UL (ref 0–0.2)
BASOPHILS RELATIVE PERCENT: 0.2 %
BILIRUB SERPL-MCNC: <0.2 MG/DL (ref 0–1)
BILIRUBIN DIRECT: <0.2 MG/DL (ref 0–0.3)
BILIRUBIN, INDIRECT: ABNORMAL MG/DL (ref 0–1)
BUN BLDV-MCNC: 4 MG/DL (ref 7–20)
CALCIUM SERPL-MCNC: 9.3 MG/DL (ref 8.3–10.6)
CHLORIDE BLD-SCNC: 100 MMOL/L (ref 99–110)
CO2: 23 MMOL/L (ref 21–32)
CREAT SERPL-MCNC: 0.7 MG/DL (ref 0.9–1.3)
EOSINOPHILS ABSOLUTE: 0.1 K/UL (ref 0–0.6)
EOSINOPHILS RELATIVE PERCENT: 1.5 %
GFR AFRICAN AMERICAN: >60
GFR NON-AFRICAN AMERICAN: >60
GLUCOSE BLD-MCNC: 91 MG/DL (ref 70–99)
HCT VFR BLD CALC: 45 % (ref 40.5–52.5)
HEMOGLOBIN: 15.4 G/DL (ref 13.5–17.5)
IGA: 136 MG/DL (ref 70–400)
LYMPHOCYTES ABSOLUTE: 2.4 K/UL (ref 1–5.1)
LYMPHOCYTES RELATIVE PERCENT: 30.3 %
MCH RBC QN AUTO: 31.2 PG (ref 26–34)
MCHC RBC AUTO-ENTMCNC: 34.1 G/DL (ref 31–36)
MCV RBC AUTO: 91.3 FL (ref 80–100)
MONOCYTES ABSOLUTE: 0.9 K/UL (ref 0–1.3)
MONOCYTES RELATIVE PERCENT: 11.4 %
NEUTROPHILS ABSOLUTE: 4.4 K/UL (ref 1.7–7.7)
NEUTROPHILS RELATIVE PERCENT: 56.6 %
PDW BLD-RTO: 16.7 % (ref 12.4–15.4)
PHOSPHORUS: 3.1 MG/DL (ref 2.5–4.9)
PLATELET # BLD: 214 K/UL (ref 135–450)
PMV BLD AUTO: 7.4 FL (ref 5–10.5)
POTASSIUM SERPL-SCNC: 3.9 MMOL/L (ref 3.5–5.1)
RBC # BLD: 4.93 M/UL (ref 4.2–5.9)
SODIUM BLD-SCNC: 139 MMOL/L (ref 136–145)
TOTAL PROTEIN: 6.4 G/DL (ref 6.4–8.2)
TSH SERPL DL<=0.05 MIU/L-ACNC: 1.53 UIU/ML (ref 0.27–4.2)
WBC # BLD: 7.8 K/UL (ref 4–11)

## 2020-08-06 PROCEDURE — 83516 IMMUNOASSAY NONANTIBODY: CPT

## 2020-08-06 PROCEDURE — 85025 COMPLETE CBC W/AUTO DIFF WBC: CPT

## 2020-08-06 PROCEDURE — 80069 RENAL FUNCTION PANEL: CPT

## 2020-08-06 PROCEDURE — 84443 ASSAY THYROID STIM HORMONE: CPT

## 2020-08-06 PROCEDURE — 82784 ASSAY IGA/IGD/IGG/IGM EACH: CPT

## 2020-08-06 PROCEDURE — 36415 COLL VENOUS BLD VENIPUNCTURE: CPT

## 2020-08-06 PROCEDURE — 80076 HEPATIC FUNCTION PANEL: CPT

## 2020-08-07 LAB — TISSUE TRANSGLUTAMINASE IGA: <0.5 U/ML (ref 0–14)

## 2020-08-12 ENCOUNTER — HOSPITAL ENCOUNTER (OUTPATIENT)
Dept: CT IMAGING | Age: 45
Discharge: HOME | End: 2020-08-12
Payer: MEDICARE

## 2020-08-12 PROCEDURE — 6360000004 HC RX CONTRAST MEDICATION: Performed by: INTERNAL MEDICINE

## 2020-08-12 PROCEDURE — 74177 CT ABD & PELVIS W/CONTRAST: CPT

## 2020-08-12 RX ADMIN — IOHEXOL 50 ML: 240 INJECTION, SOLUTION INTRATHECAL; INTRAVASCULAR; INTRAVENOUS; ORAL at 09:14

## 2020-08-12 RX ADMIN — IOPAMIDOL 75 ML: 755 INJECTION, SOLUTION INTRAVENOUS at 09:13

## 2020-08-17 ENCOUNTER — OFFICE VISIT (OUTPATIENT)
Dept: PRIMARY CARE CLINIC | Age: 45
End: 2020-08-17
Payer: MEDICARE

## 2020-08-17 PROCEDURE — G8428 CUR MEDS NOT DOCUMENT: HCPCS | Performed by: NURSE PRACTITIONER

## 2020-08-17 PROCEDURE — 99211 OFF/OP EST MAY X REQ PHY/QHP: CPT | Performed by: NURSE PRACTITIONER

## 2020-08-17 PROCEDURE — G8420 CALC BMI NORM PARAMETERS: HCPCS | Performed by: NURSE PRACTITIONER

## 2020-08-17 NOTE — PROGRESS NOTES
Erick Lizbeth received a viral test for COVID-19. They were educated on isolation and quarantine as appropriate. For any symptoms, they were directed to seek care from their PCP, given contact information to establish with a doctor, directed to an urgent care or the emergency room.

## 2020-08-17 NOTE — PATIENT INSTRUCTIONS

## 2020-08-18 LAB — SARS-COV-2, NAA: NOT DETECTED

## 2020-10-17 ENCOUNTER — HOSPITAL ENCOUNTER (OUTPATIENT)
Age: 45
Discharge: HOME | End: 2020-10-17
Payer: MEDICARE

## 2020-10-17 PROCEDURE — U0003 INFECTIOUS AGENT DETECTION BY NUCLEIC ACID (DNA OR RNA); SEVERE ACUTE RESPIRATORY SYNDROME CORONAVIRUS 2 (SARS-COV-2) (CORONAVIRUS DISEASE [COVID-19]), AMPLIFIED PROBE TECHNIQUE, MAKING USE OF HIGH THROUGHPUT TECHNOLOGIES AS DESCRIBED BY CMS-2020-01-R: HCPCS

## 2020-10-19 LAB — SARS-COV-2, NAA: NOT DETECTED

## 2020-10-28 ENCOUNTER — APPOINTMENT (OUTPATIENT)
Dept: CT IMAGING | Age: 45
DRG: 439 | End: 2020-10-28
Payer: MEDICARE

## 2020-10-28 ENCOUNTER — HOSPITAL ENCOUNTER (INPATIENT)
Age: 45
LOS: 3 days | Discharge: HOME | DRG: 439 | End: 2020-10-31
Attending: EMERGENCY MEDICINE | Admitting: INTERNAL MEDICINE
Payer: MEDICARE

## 2020-10-28 PROBLEM — K85.92 ACUTE PANCREATITIS WITH INFECTED NECROSIS: Status: ACTIVE | Noted: 2020-10-28

## 2020-10-28 LAB
A/G RATIO: 1.7 (ref 1.1–2.2)
ALBUMIN SERPL-MCNC: 4.7 G/DL (ref 3.4–5)
ALP BLD-CCNC: 137 U/L (ref 40–129)
ALT SERPL-CCNC: 56 U/L (ref 10–40)
ANION GAP SERPL CALCULATED.3IONS-SCNC: 17 MMOL/L (ref 3–16)
AST SERPL-CCNC: 75 U/L (ref 15–37)
BASOPHILS ABSOLUTE: 0 K/UL (ref 0–0.2)
BASOPHILS RELATIVE PERCENT: 0.2 %
BILIRUB SERPL-MCNC: 0.7 MG/DL (ref 0–1)
BUN BLDV-MCNC: 8 MG/DL (ref 7–20)
CALCIUM SERPL-MCNC: 11 MG/DL (ref 8.3–10.6)
CHLORIDE BLD-SCNC: 97 MMOL/L (ref 99–110)
CO2: 23 MMOL/L (ref 21–32)
CREAT SERPL-MCNC: 0.7 MG/DL (ref 0.9–1.3)
EOSINOPHILS ABSOLUTE: 0 K/UL (ref 0–0.6)
EOSINOPHILS RELATIVE PERCENT: 0.1 %
ETHANOL: NORMAL MG/DL (ref 0–0.08)
GFR AFRICAN AMERICAN: >60
GFR NON-AFRICAN AMERICAN: >60
GLOBULIN: 2.7 G/DL
GLUCOSE BLD-MCNC: 166 MG/DL (ref 70–99)
HCT VFR BLD CALC: 51.3 % (ref 40.5–52.5)
HEMOGLOBIN: 17.4 G/DL (ref 13.5–17.5)
LIPASE: 611 U/L (ref 13–60)
LYMPHOCYTES ABSOLUTE: 1 K/UL (ref 1–5.1)
LYMPHOCYTES RELATIVE PERCENT: 5.3 %
MCH RBC QN AUTO: 31.6 PG (ref 26–34)
MCHC RBC AUTO-ENTMCNC: 33.9 G/DL (ref 31–36)
MCV RBC AUTO: 93.3 FL (ref 80–100)
MONOCYTES ABSOLUTE: 1.5 K/UL (ref 0–1.3)
MONOCYTES RELATIVE PERCENT: 7.8 %
NEUTROPHILS ABSOLUTE: 16.5 K/UL (ref 1.7–7.7)
NEUTROPHILS RELATIVE PERCENT: 86.6 %
PDW BLD-RTO: 13.7 % (ref 12.4–15.4)
PLATELET # BLD: 331 K/UL (ref 135–450)
PMV BLD AUTO: 7.4 FL (ref 5–10.5)
POTASSIUM REFLEX MAGNESIUM: 3.8 MMOL/L (ref 3.5–5.1)
RBC # BLD: 5.5 M/UL (ref 4.2–5.9)
SODIUM BLD-SCNC: 137 MMOL/L (ref 136–145)
TOTAL PROTEIN: 7.4 G/DL (ref 6.4–8.2)
WBC # BLD: 19.1 K/UL (ref 4–11)

## 2020-10-28 PROCEDURE — 96365 THER/PROPH/DIAG IV INF INIT: CPT

## 2020-10-28 PROCEDURE — 96375 TX/PRO/DX INJ NEW DRUG ADDON: CPT

## 2020-10-28 PROCEDURE — 6360000002 HC RX W HCPCS: Performed by: PHYSICIAN ASSISTANT

## 2020-10-28 PROCEDURE — 96376 TX/PRO/DX INJ SAME DRUG ADON: CPT

## 2020-10-28 PROCEDURE — 36415 COLL VENOUS BLD VENIPUNCTURE: CPT

## 2020-10-28 PROCEDURE — 6360000002 HC RX W HCPCS: Performed by: EMERGENCY MEDICINE

## 2020-10-28 PROCEDURE — 6370000000 HC RX 637 (ALT 250 FOR IP): Performed by: PHYSICIAN ASSISTANT

## 2020-10-28 PROCEDURE — 99222 1ST HOSP IP/OBS MODERATE 55: CPT | Performed by: PHYSICIAN ASSISTANT

## 2020-10-28 PROCEDURE — 1200000000 HC SEMI PRIVATE

## 2020-10-28 PROCEDURE — C9113 INJ PANTOPRAZOLE SODIUM, VIA: HCPCS | Performed by: PHYSICIAN ASSISTANT

## 2020-10-28 PROCEDURE — 2580000003 HC RX 258: Performed by: PHYSICIAN ASSISTANT

## 2020-10-28 PROCEDURE — 2500000003 HC RX 250 WO HCPCS: Performed by: PHYSICIAN ASSISTANT

## 2020-10-28 PROCEDURE — 85025 COMPLETE CBC W/AUTO DIFF WBC: CPT

## 2020-10-28 PROCEDURE — 96366 THER/PROPH/DIAG IV INF ADDON: CPT

## 2020-10-28 PROCEDURE — 87040 BLOOD CULTURE FOR BACTERIA: CPT

## 2020-10-28 PROCEDURE — G0480 DRUG TEST DEF 1-7 CLASSES: HCPCS

## 2020-10-28 PROCEDURE — 74176 CT ABD & PELVIS W/O CONTRAST: CPT

## 2020-10-28 PROCEDURE — 2580000003 HC RX 258: Performed by: EMERGENCY MEDICINE

## 2020-10-28 PROCEDURE — 80053 COMPREHEN METABOLIC PANEL: CPT

## 2020-10-28 PROCEDURE — 99285 EMERGENCY DEPT VISIT HI MDM: CPT

## 2020-10-28 PROCEDURE — 83690 ASSAY OF LIPASE: CPT

## 2020-10-28 RX ORDER — POTASSIUM CHLORIDE 7.45 MG/ML
10 INJECTION INTRAVENOUS PRN
Status: DISCONTINUED | OUTPATIENT
Start: 2020-10-28 | End: 2020-10-31 | Stop reason: HOSPADM

## 2020-10-28 RX ORDER — DIAZEPAM 10 MG/1
10 TABLET ORAL EVERY 8 HOURS PRN
COMMUNITY

## 2020-10-28 RX ORDER — POTASSIUM CHLORIDE 20 MEQ/1
40 TABLET, EXTENDED RELEASE ORAL PRN
Status: DISCONTINUED | OUTPATIENT
Start: 2020-10-28 | End: 2020-10-31 | Stop reason: HOSPADM

## 2020-10-28 RX ORDER — SODIUM CHLORIDE 9 MG/ML
1000 INJECTION, SOLUTION INTRAVENOUS CONTINUOUS
Status: DISCONTINUED | OUTPATIENT
Start: 2020-10-28 | End: 2020-10-30

## 2020-10-28 RX ORDER — ONDANSETRON 2 MG/ML
4 INJECTION INTRAMUSCULAR; INTRAVENOUS ONCE
Status: COMPLETED | OUTPATIENT
Start: 2020-10-28 | End: 2020-10-28

## 2020-10-28 RX ORDER — MAGNESIUM SULFATE IN WATER 40 MG/ML
2 INJECTION, SOLUTION INTRAVENOUS PRN
Status: DISCONTINUED | OUTPATIENT
Start: 2020-10-28 | End: 2020-10-31 | Stop reason: HOSPADM

## 2020-10-28 RX ORDER — HYDRALAZINE HYDROCHLORIDE 20 MG/ML
10 INJECTION INTRAMUSCULAR; INTRAVENOUS EVERY 6 HOURS PRN
Status: DISCONTINUED | OUTPATIENT
Start: 2020-10-28 | End: 2020-10-31 | Stop reason: HOSPADM

## 2020-10-28 RX ORDER — NICOTINE 21 MG/24HR
1 PATCH, TRANSDERMAL 24 HOURS TRANSDERMAL DAILY
Status: DISCONTINUED | OUTPATIENT
Start: 2020-10-28 | End: 2020-10-31 | Stop reason: HOSPADM

## 2020-10-28 RX ORDER — 0.9 % SODIUM CHLORIDE 0.9 %
1000 INTRAVENOUS SOLUTION INTRAVENOUS ONCE
Status: COMPLETED | OUTPATIENT
Start: 2020-10-28 | End: 2020-10-28

## 2020-10-28 RX ORDER — ACETAMINOPHEN 325 MG/1
650 TABLET ORAL EVERY 4 HOURS PRN
Status: DISCONTINUED | OUTPATIENT
Start: 2020-10-28 | End: 2020-10-31 | Stop reason: HOSPADM

## 2020-10-28 RX ORDER — PROMETHAZINE HYDROCHLORIDE 25 MG/1
12.5 TABLET ORAL EVERY 6 HOURS PRN
Status: DISCONTINUED | OUTPATIENT
Start: 2020-10-28 | End: 2020-10-31 | Stop reason: HOSPADM

## 2020-10-28 RX ORDER — KETOROLAC TROMETHAMINE 30 MG/ML
15 INJECTION, SOLUTION INTRAMUSCULAR; INTRAVENOUS EVERY 6 HOURS PRN
Status: DISPENSED | OUTPATIENT
Start: 2020-10-28 | End: 2020-10-29

## 2020-10-28 RX ORDER — LORAZEPAM 2 MG/ML
3 INJECTION INTRAMUSCULAR
Status: DISCONTINUED | OUTPATIENT
Start: 2020-10-28 | End: 2020-10-30

## 2020-10-28 RX ORDER — LORAZEPAM 1 MG/1
1 TABLET ORAL
Status: DISCONTINUED | OUTPATIENT
Start: 2020-10-28 | End: 2020-10-30

## 2020-10-28 RX ORDER — SODIUM CHLORIDE, SODIUM LACTATE, POTASSIUM CHLORIDE, CALCIUM CHLORIDE 600; 310; 30; 20 MG/100ML; MG/100ML; MG/100ML; MG/100ML
INJECTION, SOLUTION INTRAVENOUS CONTINUOUS
Status: DISCONTINUED | OUTPATIENT
Start: 2020-10-29 | End: 2020-10-30

## 2020-10-28 RX ORDER — LORAZEPAM 2 MG/ML
2 INJECTION INTRAMUSCULAR
Status: DISCONTINUED | OUTPATIENT
Start: 2020-10-28 | End: 2020-10-30

## 2020-10-28 RX ORDER — SODIUM CHLORIDE 0.9 % (FLUSH) 0.9 %
10 SYRINGE (ML) INJECTION PRN
Status: DISCONTINUED | OUTPATIENT
Start: 2020-10-28 | End: 2020-10-28 | Stop reason: SDUPTHER

## 2020-10-28 RX ORDER — PANTOPRAZOLE SODIUM 40 MG/10ML
40 INJECTION, POWDER, LYOPHILIZED, FOR SOLUTION INTRAVENOUS DAILY
Status: DISCONTINUED | OUTPATIENT
Start: 2020-10-28 | End: 2020-10-31 | Stop reason: HOSPADM

## 2020-10-28 RX ORDER — LORAZEPAM 2 MG/1
4 TABLET ORAL
Status: DISCONTINUED | OUTPATIENT
Start: 2020-10-28 | End: 2020-10-30

## 2020-10-28 RX ORDER — LORAZEPAM 2 MG/ML
1 INJECTION INTRAMUSCULAR
Status: DISCONTINUED | OUTPATIENT
Start: 2020-10-28 | End: 2020-10-30

## 2020-10-28 RX ORDER — LORAZEPAM 2 MG/ML
4 INJECTION INTRAMUSCULAR
Status: DISCONTINUED | OUTPATIENT
Start: 2020-10-28 | End: 2020-10-30

## 2020-10-28 RX ORDER — LORAZEPAM 2 MG/1
2 TABLET ORAL
Status: DISCONTINUED | OUTPATIENT
Start: 2020-10-28 | End: 2020-10-30

## 2020-10-28 RX ORDER — ONDANSETRON 2 MG/ML
4 INJECTION INTRAMUSCULAR; INTRAVENOUS EVERY 6 HOURS PRN
Status: DISCONTINUED | OUTPATIENT
Start: 2020-10-28 | End: 2020-10-31 | Stop reason: HOSPADM

## 2020-10-28 RX ORDER — SODIUM CHLORIDE 0.9 % (FLUSH) 0.9 %
10 SYRINGE (ML) INJECTION EVERY 12 HOURS SCHEDULED
Status: DISCONTINUED | OUTPATIENT
Start: 2020-10-28 | End: 2020-10-31 | Stop reason: HOSPADM

## 2020-10-28 RX ORDER — SODIUM CHLORIDE, SODIUM LACTATE, POTASSIUM CHLORIDE, CALCIUM CHLORIDE 600; 310; 30; 20 MG/100ML; MG/100ML; MG/100ML; MG/100ML
INJECTION, SOLUTION INTRAVENOUS CONTINUOUS
Status: DISPENSED | OUTPATIENT
Start: 2020-10-28 | End: 2020-10-29

## 2020-10-28 RX ORDER — SODIUM CHLORIDE 0.9 % (FLUSH) 0.9 %
10 SYRINGE (ML) INJECTION PRN
Status: DISCONTINUED | OUTPATIENT
Start: 2020-10-28 | End: 2020-10-31 | Stop reason: HOSPADM

## 2020-10-28 RX ORDER — SODIUM CHLORIDE 0.9 % (FLUSH) 0.9 %
10 SYRINGE (ML) INJECTION EVERY 12 HOURS SCHEDULED
Status: DISCONTINUED | OUTPATIENT
Start: 2020-10-28 | End: 2020-10-28 | Stop reason: SDUPTHER

## 2020-10-28 RX ADMIN — HYDROMORPHONE HYDROCHLORIDE 1 MG: 1 INJECTION, SOLUTION INTRAMUSCULAR; INTRAVENOUS; SUBCUTANEOUS at 12:03

## 2020-10-28 RX ADMIN — ONDANSETRON HYDROCHLORIDE 4 MG: 2 INJECTION, SOLUTION INTRAMUSCULAR; INTRAVENOUS at 18:23

## 2020-10-28 RX ADMIN — SODIUM CHLORIDE 1000 ML: 9 INJECTION, SOLUTION INTRAVENOUS at 15:05

## 2020-10-28 RX ADMIN — ONDANSETRON HYDROCHLORIDE 4 MG: 2 INJECTION, SOLUTION INTRAMUSCULAR; INTRAVENOUS at 09:50

## 2020-10-28 RX ADMIN — PIPERACILLIN SODIUM AND TAZOBACTAM SODIUM 3.38 G: 3; .375 INJECTION, POWDER, LYOPHILIZED, FOR SOLUTION INTRAVENOUS at 18:22

## 2020-10-28 RX ADMIN — SODIUM CHLORIDE, POTASSIUM CHLORIDE, SODIUM LACTATE AND CALCIUM CHLORIDE: 600; 310; 30; 20 INJECTION, SOLUTION INTRAVENOUS at 18:22

## 2020-10-28 RX ADMIN — SODIUM CHLORIDE 1000 ML: 9 INJECTION, SOLUTION INTRAVENOUS at 09:49

## 2020-10-28 RX ADMIN — HYDROMORPHONE HYDROCHLORIDE 1 MG: 1 INJECTION, SOLUTION INTRAMUSCULAR; INTRAVENOUS; SUBCUTANEOUS at 09:50

## 2020-10-28 RX ADMIN — LORAZEPAM 2 MG: 2 TABLET ORAL at 23:04

## 2020-10-28 RX ADMIN — LORAZEPAM 3 MG: 2 TABLET ORAL at 19:50

## 2020-10-28 RX ADMIN — Medication 10 ML: at 21:19

## 2020-10-28 RX ADMIN — Medication 10 ML: at 18:21

## 2020-10-28 RX ADMIN — HYDROMORPHONE HYDROCHLORIDE 1 MG: 1 INJECTION, SOLUTION INTRAMUSCULAR; INTRAVENOUS; SUBCUTANEOUS at 15:05

## 2020-10-28 RX ADMIN — KETOROLAC TROMETHAMINE 15 MG: 30 INJECTION, SOLUTION INTRAMUSCULAR; INTRAVENOUS at 21:26

## 2020-10-28 RX ADMIN — HYDROMORPHONE HYDROCHLORIDE 0.5 MG: 1 INJECTION, SOLUTION INTRAMUSCULAR; INTRAVENOUS; SUBCUTANEOUS at 18:22

## 2020-10-28 RX ADMIN — SODIUM CHLORIDE, POTASSIUM CHLORIDE, SODIUM LACTATE AND CALCIUM CHLORIDE: 600; 310; 30; 20 INJECTION, SOLUTION INTRAVENOUS at 23:04

## 2020-10-28 RX ADMIN — PANTOPRAZOLE SODIUM 40 MG: 40 INJECTION, POWDER, FOR SOLUTION INTRAVENOUS at 18:23

## 2020-10-28 RX ADMIN — HYDROMORPHONE HYDROCHLORIDE 0.5 MG: 1 INJECTION, SOLUTION INTRAMUSCULAR; INTRAVENOUS; SUBCUTANEOUS at 21:25

## 2020-10-28 ASSESSMENT — PAIN SCALES - GENERAL
PAINLEVEL_OUTOF10: 8
PAINLEVEL_OUTOF10: 7
PAINLEVEL_OUTOF10: 5
PAINLEVEL_OUTOF10: 5
PAINLEVEL_OUTOF10: 4
PAINLEVEL_OUTOF10: 10
PAINLEVEL_OUTOF10: 6
PAINLEVEL_OUTOF10: 7
PAINLEVEL_OUTOF10: 10

## 2020-10-28 ASSESSMENT — PAIN DESCRIPTION - LOCATION
LOCATION: ABDOMEN

## 2020-10-28 ASSESSMENT — PAIN DESCRIPTION - PAIN TYPE
TYPE: ACUTE PAIN

## 2020-10-28 ASSESSMENT — PAIN DESCRIPTION - DESCRIPTORS: DESCRIPTORS: SHARP;STABBING

## 2020-10-28 NOTE — H&P
Hospital Medicine History & Physical      PCP: Rivas Coleman MD    Date of Admission: 10/28/2020    Date of Service: Pt seen/examined on 10/28/2020    Chief Complaint:    Chief Complaint   Patient presents with    Abdominal Pain     h/o pancreatitits states feels just like that onset 0400. Pain is sharp stabbing 10/10. Pt feels nauseated also      History Of Present Illness: The patient is a 39 y.o. male with alcoholism, bipolar disorder, polysubstance abuse, depression reports a history of pancreatitis with diagnosis of pancreatic mass status post biopsy which he told was benign with recent celiac block performed by Dr. Manny Walker on 10/22 who presented to [facility] ED with complaint of abdominal pain, nausea and vomiting. Patient states that he has been drinking about a 12 pack of beer per day. He has been doing this since he was in his 25s. He reports he woke up this morning around 4:00 with severe abdominal pain, profuse nausea and vomiting. He does report drinking a beer around 7:00 this morning but then came to the ER for worsening abdominal pain. He reports it feels similar to his prior episodes of pancreatitis. He does report having the recent celiac block and felt like he had pain relief for the first day or 2 since then does not feel like he is had any significant changes in his chronic symptoms. He reports a history of alcohol withdrawal symptoms in the past with significant tremors but denies any seizures or need for ICU admissions. Patient reports he has no desire to ever drink again. He denies any associated fevers at home. He denies any chills. He denies any chest pain or shortness of breath.     Past Medical History:        Diagnosis Date    Alcoholism ([address]     Antisocial behavior     Bipolar affective disorder ([facility]     Depression     Insomnia     Low back pain     Panic disorder     Polysubstance abuse ([facility]     Tobacco abuse        Past Surgical History:        Procedure Laterality Date  FOOT SURGERY  Age 17    ORIF Right foot.  WISDOM TOOTH EXTRACTION         Medications Prior to Admission:    Prior to Admission medications    Medication Sig Start Date End Date Taking? Authorizing Provider   QUEtiapine (SEROQUEL) 50 MG tablet Take 1 tablet by mouth 3 times daily for 14 days 12/25/19 1/8/20  MOY Alvarez - CNP   clonazePAM (KLONOPIN) 2 MG tablet Take 1 tablet by mouth 2 times daily as needed for Anxiety for up to 3 days. . 1/14/19 12/25/19  Erik Silvestre MD   omeprazole (PRILOSEC) 20 MG delayed release capsule Take 20 mg by mouth daily    Historical Provider, MD   escitalopram (LEXAPRO) 10 MG tablet Take 10 mg by mouth daily    Historical Provider, MD       Allergies:  Patient has no known allergies. Social History:  The patient currently lives at home    TOBACCO:   reports that he has been smoking cigarettes. He has a 9.50 pack-year smoking history. He has quit using smokeless tobacco.  ETOH:   reports current alcohol use.       Family History:   Positive as follows:        Problem Relation Age of Onset    High Blood Pressure Mother     Depression Mother         panic    Anxiety Disorder Mother     High Blood Pressure Father     Heart Disease Maternal Grandmother         MI    High Blood Pressure Maternal Grandmother     Cancer Maternal Grandmother     Depression Maternal Grandmother         panic    Anxiety Disorder Maternal Grandmother     High Blood Pressure Maternal Grandfather     Heart Disease Paternal Grandmother         MI    Stroke Paternal Grandmother     High Blood Pressure Paternal Grandmother     Alcohol Abuse Paternal Grandmother     Heart Disease Paternal Grandfather         MI   Elizabeth Her High Blood Pressure Paternal Grandfather     Alcohol Abuse Paternal Grandfather     Substance Abuse Paternal Grandfather     Alcohol Abuse Paternal Aunt     Alcohol Abuse Paternal Uncle        REVIEW OF SYSTEMS:     Constitutional: + Malaise negative for fever HENT: Negative for sore throat   Eyes: Negative for redness   Respiratory: Negative  for dyspnea, cough   Cardiovascular: Negative for chest pain   Gastrointestinal: + Abdominal pain, + nausea vomiting  Genitourinary: Negative for hematuria   Musculoskeletal: Negative for arthralgias   Skin: Negative for rash   Neurological: + Tremors  Hematological: Negative for adenopathy   Psychiatric/Behavorial: + Anxiety, + depression    PHYSICAL EXAM:    BP (!) 156/145   Pulse 93   Temp 98.5 °F (36.9 °C) (Oral)   Resp 28   Ht 6' (1.829 m)   Wt 160 lb (72.6 kg)   SpO2 96%   BMI 21.70 kg/m²   Gen: Thin, ill-appearing male. Alert. Eyes: No conjunctival injection. ENT: No discharge. Pharynx clear. Neck: Trachea midline. Resp: No accessory muscle use. No crackles. + Wheezes. No rhonchi. CV: Regular rate. Regular rhythm. No murmur. No rub. No edema. Capillary Refill: Brisk,< 3 seconds   Peripheral Pulses: +2 palpable, equal bilaterally   GI: Diffuse abdominal tenderness to palpation. Voluntary guarding. non-distended. Normal bowel sounds. Skin: Warm and dry. Superficial abrasion to the volar aspect of the left thumb, mild surrounding erythema  M/S: No cyanosis. No joint deformity. No clubbing. Neuro: Awake. Grossly nonfocal, bilateral upper extremity tremors, mild tongue fasciculations  Psych: Oriented x 3. No anxiety or agitation.      CBC:   Recent Labs     10/28/20  0843   WBC 19.1*   HGB 17.4   HCT 51.3   MCV 93.3        BMP:   Recent Labs     10/28/20  0843      K 3.8   CL 97*   CO2 23   BUN 8   CREATININE 0.7*     LIVER PROFILE:   Recent Labs     10/28/20  0843   AST 75*   ALT 56*   LIPASE 611.0*   BILITOT 0.7   ALKPHOS 137*     U/A:    Lab Results   Component Value Date    COLORU Yellow 01/05/2019    WBCUA 3-5 06/05/2018    RBCUA 0-2 06/05/2018    MUCUS 3+ 06/05/2018    BACTERIA 1+ 06/05/2018    CLARITYU Clear 01/05/2019    SPECGRAV <=1.005 01/05/2019    LEUKOCYTESUR Negative 01/05/2019 BLOODU Negative 01/05/2019    GLUCOSEU Negative 01/05/2019    AMORPHOUS 1+ 06/05/2018     CULTURES  Blood Cx: pending     EKG:    No EKG from this admission for review    RADIOLOGY  CT ABDOMEN PELVIS WO CONTRAST Additional Contrast? None   Final Result   1. Large pancreatic carcinoma with associated duodenal invasion. 2. Nonspecific borderline enlarged right upper quadrant omental lymph nodes   and lingular pulmonary nodule concerning for metastatic involvement. PET   scan recommended for further evaluation. Pertinent previous results reviewed   FINAL DIAGNOSIS:  Pancreatic Head Mass, Fine Needle Aspiration (1 Thin Prep, 6 Smears, 2 Cell  Block):  NEGATIVE FOR MALIGNANT CELLS. - Cellular aspirate with abundant acinar cells and features suggestive of  fat necrosis.     ASSESSMENT/PLAN:  Acute pancreatitis, suspected necrotic component  Pancreatic Mass  -Patient with a history of pancreatitis, secondary to alcohol abuse  -Has followed with Dr. Angelic Osman, status post EUS with fine-needle aspiration 8/20 which showed negative for malignant cells and features suggestive of fat necrosis  -CT abdomen pelvis on 8/12 showed a pancreatic mass at 4 cm, repeat CT scan today shows a large pancreatic carcinoma with associated duodenal invasion measured at 5.5 cm--> concerning given increase in size  -Given biopsy results from prior procedure, will start Zosyn D#1with elevated leukocytosis as well, lipase 611  -GI consult to Dr. Angelic Osman  -Diet NPO  -IV fluids  -As needed IV pain medications  -Check triglycerides  -Status post EUS with plexus block on 10/22/2020 with Dr. Angelic Osman    Leukocytosis  -See above, suspect secondary to possible necrosis  -Continue IV antibiotics  -Check blood culture    EtOH abuse  -Drinks approximately 12 pack of beer per day  -Last drink was 7 AM on 10/28, BAC negative   -Now with acute withdrawal symptoms with tremors and tongue fasciculations  -CIWA with as needed Ativan, start Librium when able to tolerate p.o.  -Fall and seizure precautions    Elevated LFTs  - suspect 2/2 above   - Trend     Psych   -Continue home medications when able to tolerate p.o.  -Chronically takes benzodiazepines, continue with IV Ativan as above    Tobacco Abuse   - Recommend cessation   - PRN Nicotine patch/gum     HTN  - BP is uncontrolled  -No prior history of hypertension, suspect component of elevation secondary to pain and alcohol withdrawal  -PRN hydralazine    DVT Prophylaxis: Lovenox   Diet: Diet NPO Effective Now   Code Status: Prior      Leobardo Orr PA-C  10/28/2020 3:49 PM

## 2020-10-28 NOTE — ED NOTES
Report given to [facility] RN at this time.       Froylan Manzo RN  10/28/20 0307

## 2020-10-28 NOTE — ED PROVIDER NOTES
Emergency Physician Note  [address] [facility] ED  [address] [facility] [place] [address]  Dept: [phone]  Loc: [phone]  Open Note Time:  11:55 AM    Chief Complaint  Abdominal Pain (h/o pancreatitits states feels just like that onset 0400. Pain is sharp stabbing 10/10. Pt feels nauseated also )       History of Present Illness  Jim Haines is a 39 y.o. male  has a past medical history of Alcoholism ([address] Antisocial behavior, Bipolar affective disorder ([address] Depression, Insomnia, Low back pain, Panic disorder, Polysubstance abuse ([facility] and Tobacco abuse. who presents to the ED for addie pain. Patient states she had sudden onset of left upper quadrant abdominal pain that started at 4 AM, it is also also associated with multiple episodes of nausea and vomiting. Denies diarrhea. Patient states he still drinks alcohol occasionally. He drank alcohol last night. Denies fever, chills, malaise, chest pain, shortness of breath, cough, diarrhea, headache, sore throat, dysuria, back pain, rash. No palliative/provocative factors. Unless otherwise stated in this report or unable to obtain because of the patient's clinical or mental status as evidenced by the medical record, this patient's positive and negative responses for review of systems, constitutional, psych, eyes, ENT, cardiovascular, respiratory, gastrointestinal, neurological, genitourinary, musculoskeletal, integument systems and systems related to the presenting problem are either stated in the preceding paragraph or were not pertinent or were negative for the symptoms and/or complaints related to the medical problem. I have reviewed the following from the nursing documentation:      Prior to Admission medications    Medication Sig Start Date End Date Taking?  Authorizing Provider   QUEtiapine (SEROQUEL) 50 MG tablet Take 1 tablet by mouth 3 times daily for 14 days 12/25/19 1/8/20  Yogi Casey APRN - CNP   clonazePAM (KLONOPIN) 2 MG tablet Take 1 tablet by mouth 2 times daily as needed for Anxiety for up to 3 days. . 1/14/19 12/25/19  Beryle Chandler, MD   omeprazole (PRILOSEC) 20 MG delayed release capsule Take 20 mg by mouth daily    Historical Provider, MD   escitalopram (LEXAPRO) 10 MG tablet Take 10 mg by mouth daily    Historical Provider, MD       Allergies as of 10/28/2020    (No Known Allergies)       Past Medical History:   Diagnosis Date    [facility]ism ([facility]     Antisocial behavior     Bipolar affective disorder ([facility]     Depression     Insomnia     Low back pain     Panic disorder     Polysubstance abuse ([facility]     Tobacco abuse         Surgical History:   Past Surgical History:   Procedure Laterality Date    FOOT SURGERY  Age 17    ORIF Right foot.     WISDOM TOOTH EXTRACTION          Family History:    Family History   Problem Relation Age of Onset    High Blood Pressure Mother     Depression Mother         panic    Anxiety Disorder Mother     High Blood Pressure Father     Heart Disease Maternal Grandmother         MI    High Blood Pressure Maternal Grandmother     Cancer Maternal Grandmother     Depression Maternal Grandmother         panic    Anxiety Disorder Maternal Grandmother     High Blood Pressure Maternal Grandfather     Heart Disease Paternal Grandmother         MI    Stroke Paternal Grandmother     High Blood Pressure Paternal Grandmother     Alcohol Abuse Paternal Grandmother     Heart Disease Paternal Grandfather         MI    High Blood Pressure Paternal Grandfather     Alcohol Abuse Paternal Grandfather     Substance Abuse Paternal Grandfather     Alcohol Abuse Paternal Aunt     Alcohol Abuse Paternal Uncle        Social History     Socioeconomic History    Marital status: Legally      Spouse name: Not on file    Number of children: 1    Years of education: Not on file    Highest education level: Not on file   Occupational History    Occupation:    Social Needs    Financial resource strain: Not on file    Food insecurity     Worry: Not on file     Inability: Not on file   [employer] needs     Medical: Not on file     Non-medical: Not on file   Tobacco Use    Smoking status: Current Every Day Smoker     Packs/day: 0.50     Years: 19.00     Pack years: 9.50     Types: Cigarettes    Smokeless tobacco: Former User   Substance and Sexual Activity    Alcohol use: Yes     Comment: 1-2 beers per night    Drug use: No    Sexual activity: Never     Partners: Female   Lifestyle    Physical activity     Days per week: Not on file     Minutes per session: Not on file    Stress: Not on file   Relationships    Social connections     Talks on phone: Not on file     Gets together: Not on file     Attends [employer] service: Not on file     Active member of club or organization: Not on file     Attends meetings of clubs or organizations: Not on file     Relationship status: Not on file    Intimate partner violence     Fear of current or ex partner: Not on file     Emotionally abused: Not on file     Physically abused: Not on file     Forced sexual activity: Not on file   Other Topics Concern    Not on file   Social History Narrative    Not on file       Nursing notes reviewed. ED Triage Vitals [10/28/20 0836]   Enc Vitals Group      BP (!) 167/125      Pulse 88      Resp 20      Temp 98.5 °F (36.9 °C)      Temp Source Oral      SpO2 100 %      Weight 160 lb (72.6 kg)      Height 6' (1.829 m)      Head Circumference       Peak Flow       Pain Score       Pain Loc       Pain Edu? Excl. in [address]? GENERAL:      Awake, alert. Lying in the bed, appears in a lot of pain, holding his stomach in the left upper quadrant region  Well developed, well nourished with apparent distress. Nontoxic-appearing, non-ill-appearing. HENT:    Normocephalic, Atraumatic, no lacerations. No ENT exam due to PPE.     EYES:    Conjunctiva normal,   Pupils equal round and reactive to light,   Extraocular movements normal.  NECK:      Trachea is midline. No stridor. CHEST:      Regular rate and regular rhythm, no murmurs/rubs/gallops,   normal S1/S2, chest wall non-tender. LUNGS:      No respiratory distress. No abdominal retractions, no sternal retractions. Clear to auscultation bilaterally, no wheezing, no rhochi, no rales  Speaking comfortably in full sentences  ABDOMEN:      Soft, moderate left upper quadrant tenderness to palpation, non-distended. No guarding. No rebound. Left costovertebral angle tenderness to palpation. Normal BS, no organomegaly, no abdominal masses  EXTREMITIES:     Moves extremities x4 with purpose. Normal range of motion, no edema,   No tenderness, no deformity,   distal pulses present and equal bilaterally. SKIN:      Warm, dry and intact. NEUROLOGIC:    Normal mental status. Moving all extremities to command. Alert and oriented x4   without focal motor deficit or gross sensory deficit. Normal speech. PSYCHIATRIC:    Not anxious,   normal mood and affect,   thoughts are linear and organized,   without delusions/hallucinations,   Not responding to internal stimuli,  responds appropriately to questions    LABS and DIAGNOSTIC RESULTS      RADIOLOGY  X-RAYS:  I have reviewed radiologic plain film image(s). ALL OTHER NON-PLAIN FILM IMAGES SUCH AS CT, ULTRASOUND AND MRI HAVE BEEN READ BY THE RADIOLOGIST. CT ABDOMEN PELVIS WO CONTRAST Additional Contrast? None   Final Result   1. Large pancreatic carcinoma with associated duodenal invasion. 2. Nonspecific borderline enlarged right upper quadrant omental lymph nodes   and lingular pulmonary nodule concerning for metastatic involvement. PET   scan recommended for further evaluation.                 LABS  Results for orders placed or performed during the hospital encounter of 10/28/20   CBC Auto Differential   Result Value Ref Range    WBC 19.1 (H) 4.0 - 11.0 K/uL    RBC 5.50 4. 20 - 5.90 M/uL    Hemoglobin 17.4 13.5 - 17.5 g/dL    Hematocrit 51.3 40.5 - 52.5 %    MCV 93.3 80.0 - 100.0 fL    MCH 31.6 26.0 - 34.0 pg    MCHC 33.9 31.0 - 36.0 g/dL    RDW 13.7 12.4 - 15.4 %    Platelets 874 777 - 761 K/uL    MPV 7.4 5.0 - 10.5 fL    Neutrophils % 86.6 %    Lymphocytes % 5.3 %    Monocytes % 7.8 %    Eosinophils % 0.1 %    Basophils % 0.2 %    Neutrophils Absolute 16.5 (H) 1.7 - 7.7 K/uL    Lymphocytes Absolute 1.0 1.0 - 5.1 K/uL    Monocytes Absolute 1.5 (H) 0.0 - 1.3 K/uL    Eosinophils Absolute 0.0 0.0 - 0.6 K/uL    Basophils Absolute 0.0 0.0 - 0.2 K/uL   Comprehensive Metabolic Panel w/ Reflex to MG   Result Value Ref Range    Sodium 137 136 - 145 mmol/L    Potassium reflex Magnesium 3.8 3.5 - 5.1 mmol/L    Chloride 97 (L) 99 - 110 mmol/L    CO2 23 21 - 32 mmol/L    Anion Gap 17 (H) 3 - 16    Glucose 166 (H) 70 - 99 mg/dL    BUN 8 7 - 20 mg/dL    CREATININE 0.7 (L) 0.9 - 1.3 mg/dL    GFR Non-African American >60 >60    GFR African American >60 >60    Calcium 11.0 (H) 8.3 - 10.6 mg/dL    Total Protein 7.4 6.4 - 8.2 g/dL    Alb 4.7 3.4 - 5.0 g/dL    Albumin/Globulin Ratio 1.7 1.1 - 2.2    Total Bilirubin 0.7 0.0 - 1.0 mg/dL    Alkaline Phosphatase 137 (H) 40 - 129 U/L    ALT 56 (H) 10 - 40 U/L    AST 75 (H) 15 - 37 U/L    Globulin 2.7 g/dL   Lipase   Result Value Ref Range    Lipase 611.0 (H) 13.0 - 60.0 U/L   Ethanol   Result Value Ref Range    Ethanol Lvl None Detected mg/dL       PROCEDURES    MEDICAL DECISION MAKING    Procedures/interventions/images ordered for this visit  Orders Placed This Encounter   Procedures    CT ABDOMEN PELVIS WO CONTRAST Additional Contrast? None    CBC Auto Differential    Comprehensive Metabolic Panel w/ Reflex to MG    Lipase    Ethanol    Diet NPO Effective Now       Medications ordered for this visit  Orders Placed This Encounter   Medications    HYDROmorphone (DILAUDID) injection 1 mg    ondansetron (ZOFRAN) injection 4 mg    0.9 % sodium chloride IV bolus 1,000 mL       ED course notes for this visit       I wore  surgical mask, and gloves when I evaluated the patient. I evaluated the patient in room 09/09    - Old records reviewed, specifically Previous evaluations for pancreatitis      11:55 AM  To the patient about the mass, explained to him that was there in July. He reports that he did have a biopsy done he was told at that time that \"there was nothing wrong\". Patient states the pain is 5 out of 10 now. I did attempt to find out further information about the biopsy that had been done, however the GI specialist I spoke with did not have access to the pathology report. Results for orders placed or performed during the hospital encounter of 10/28/20   CBC Auto Differential   Result Value Ref Range    WBC 19.1 (H) 4.0 - 11.0 K/uL    RBC 5.50 4. 20 - 5.90 M/uL    Hemoglobin 17.4 13.5 - 17.5 g/dL    Hematocrit 51.3 40.5 - 52.5 %    MCV 93.3 80.0 - 100.0 fL    MCH 31.6 26.0 - 34.0 pg    MCHC 33.9 31.0 - 36.0 g/dL    RDW 13.7 12.4 - 15.4 %    Platelets 975 235 - 115 K/uL    MPV 7.4 5.0 - 10.5 fL    Neutrophils % 86.6 %    Lymphocytes % 5.3 %    Monocytes % 7.8 %    Eosinophils % 0.1 %    Basophils % 0.2 %    Neutrophils Absolute 16.5 (H) 1.7 - 7.7 K/uL    Lymphocytes Absolute 1.0 1.0 - 5.1 K/uL    Monocytes Absolute 1.5 (H) 0.0 - 1.3 K/uL    Eosinophils Absolute 0.0 0.0 - 0.6 K/uL    Basophils Absolute 0.0 0.0 - 0.2 K/uL   Comprehensive Metabolic Panel w/ Reflex to MG   Result Value Ref Range    Sodium 137 136 - 145 mmol/L    Potassium reflex Magnesium 3.8 3.5 - 5.1 mmol/L    Chloride 97 (L) 99 - 110 mmol/L    CO2 23 21 - 32 mmol/L    Anion Gap 17 (H) 3 - 16    Glucose 166 (H) 70 - 99 mg/dL    BUN 8 7 - 20 mg/dL    CREATININE 0.7 (L) 0.9 - 1.3 mg/dL    GFR Non-African American >60 >60    GFR African American >60 >60    Calcium 11.0 (H) 8.3 - 10.6 mg/dL    Total Protein 7.4 6.4 - 8.2 g/dL    Alb 4.7 3.4 - 5.0 transcription. Every effort was made to ensure accuracy; however, inadvertent transcription errors may be present despite my best efforts to edit errors.     Tiffany King MD  [address] [place] [address], MD  10/28/20 3597

## 2020-10-28 NOTE — ED NOTES
RN gave report to [facility] RN for continuity of care.       Ernie Villavicencio RN  10/28/20 1256

## 2020-10-28 NOTE — PROGRESS NOTES
[facility] note:    Called for GI consult for pancreatic mass. Chart review shows this is an established [facility] patient, just seen by Dr Henriquez Records for EUS and celiac plexus block. [place] [facility] [place] informed [place] Henriquez Records about this patient.

## 2020-10-29 LAB
ANION GAP SERPL CALCULATED.3IONS-SCNC: 10 MMOL/L (ref 3–16)
BASOPHILS ABSOLUTE: 0 K/UL (ref 0–0.2)
BASOPHILS RELATIVE PERCENT: 0.3 %
BUN BLDV-MCNC: 10 MG/DL (ref 7–20)
CALCIUM SERPL-MCNC: 8.7 MG/DL (ref 8.3–10.6)
CHLORIDE BLD-SCNC: 102 MMOL/L (ref 99–110)
CO2: 23 MMOL/L (ref 21–32)
CREAT SERPL-MCNC: 0.6 MG/DL (ref 0.9–1.3)
EOSINOPHILS ABSOLUTE: 0 K/UL (ref 0–0.6)
EOSINOPHILS RELATIVE PERCENT: 0.4 %
GFR AFRICAN AMERICAN: >60
GFR NON-AFRICAN AMERICAN: >60
GLUCOSE BLD-MCNC: 93 MG/DL (ref 70–99)
HCT VFR BLD CALC: 42.7 % (ref 40.5–52.5)
HEMOGLOBIN: 14.2 G/DL (ref 13.5–17.5)
LYMPHOCYTES ABSOLUTE: 1.3 K/UL (ref 1–5.1)
LYMPHOCYTES RELATIVE PERCENT: 14.8 %
MCH RBC QN AUTO: 32.2 PG (ref 26–34)
MCHC RBC AUTO-ENTMCNC: 33.3 G/DL (ref 31–36)
MCV RBC AUTO: 96.5 FL (ref 80–100)
MONOCYTES ABSOLUTE: 0.9 K/UL (ref 0–1.3)
MONOCYTES RELATIVE PERCENT: 10.6 %
NEUTROPHILS ABSOLUTE: 6.3 K/UL (ref 1.7–7.7)
NEUTROPHILS RELATIVE PERCENT: 73.9 %
PDW BLD-RTO: 13.8 % (ref 12.4–15.4)
PLATELET # BLD: 226 K/UL (ref 135–450)
PMV BLD AUTO: 7.6 FL (ref 5–10.5)
POTASSIUM REFLEX MAGNESIUM: 3.9 MMOL/L (ref 3.5–5.1)
RBC # BLD: 4.42 M/UL (ref 4.2–5.9)
SARS-COV-2, NAAT: NOT DETECTED
SODIUM BLD-SCNC: 135 MMOL/L (ref 136–145)
TRIGL SERPL-MCNC: 135 MG/DL (ref 0–150)
WBC # BLD: 8.5 K/UL (ref 4–11)

## 2020-10-29 PROCEDURE — 99232 SBSQ HOSP IP/OBS MODERATE 35: CPT | Performed by: INTERNAL MEDICINE

## 2020-10-29 PROCEDURE — 6360000002 HC RX W HCPCS: Performed by: PHYSICIAN ASSISTANT

## 2020-10-29 PROCEDURE — 2580000003 HC RX 258: Performed by: PHYSICIAN ASSISTANT

## 2020-10-29 PROCEDURE — 6370000000 HC RX 637 (ALT 250 FOR IP): Performed by: PHYSICIAN ASSISTANT

## 2020-10-29 PROCEDURE — C9113 INJ PANTOPRAZOLE SODIUM, VIA: HCPCS | Performed by: PHYSICIAN ASSISTANT

## 2020-10-29 PROCEDURE — U0002 COVID-19 LAB TEST NON-CDC: HCPCS

## 2020-10-29 PROCEDURE — 80048 BASIC METABOLIC PNL TOTAL CA: CPT

## 2020-10-29 PROCEDURE — 2500000003 HC RX 250 WO HCPCS: Performed by: PHYSICIAN ASSISTANT

## 2020-10-29 PROCEDURE — 36415 COLL VENOUS BLD VENIPUNCTURE: CPT

## 2020-10-29 PROCEDURE — 84478 ASSAY OF TRIGLYCERIDES: CPT

## 2020-10-29 PROCEDURE — 85025 COMPLETE CBC W/AUTO DIFF WBC: CPT

## 2020-10-29 PROCEDURE — 1200000000 HC SEMI PRIVATE

## 2020-10-29 RX ORDER — HYDROMORPHONE HCL 110MG/55ML
0.5 PATIENT CONTROLLED ANALGESIA SYRINGE INTRAVENOUS
Status: DISCONTINUED | OUTPATIENT
Start: 2020-10-29 | End: 2020-10-30

## 2020-10-29 RX ADMIN — KETOROLAC TROMETHAMINE 15 MG: 30 INJECTION, SOLUTION INTRAMUSCULAR; INTRAVENOUS at 10:53

## 2020-10-29 RX ADMIN — HYDROMORPHONE HYDROCHLORIDE 0.5 MG: 1 INJECTION, SOLUTION INTRAMUSCULAR; INTRAVENOUS; SUBCUTANEOUS at 06:32

## 2020-10-29 RX ADMIN — PIPERACILLIN SODIUM AND TAZOBACTAM SODIUM 3.38 G: 3; .375 INJECTION, POWDER, LYOPHILIZED, FOR SOLUTION INTRAVENOUS at 18:07

## 2020-10-29 RX ADMIN — LORAZEPAM 2 MG: 2 TABLET ORAL at 02:06

## 2020-10-29 RX ADMIN — HYDROMORPHONE HYDROCHLORIDE 0.5 MG: 1 INJECTION, SOLUTION INTRAMUSCULAR; INTRAVENOUS; SUBCUTANEOUS at 00:25

## 2020-10-29 RX ADMIN — PIPERACILLIN SODIUM AND TAZOBACTAM SODIUM 3.38 G: 3; .375 INJECTION, POWDER, LYOPHILIZED, FOR SOLUTION INTRAVENOUS at 09:04

## 2020-10-29 RX ADMIN — LORAZEPAM 1 MG: 2 INJECTION INTRAMUSCULAR; INTRAVENOUS at 10:52

## 2020-10-29 RX ADMIN — SODIUM CHLORIDE, POTASSIUM CHLORIDE, SODIUM LACTATE AND CALCIUM CHLORIDE: 600; 310; 30; 20 INJECTION, SOLUTION INTRAVENOUS at 09:03

## 2020-10-29 RX ADMIN — SODIUM CHLORIDE, POTASSIUM CHLORIDE, SODIUM LACTATE AND CALCIUM CHLORIDE: 600; 310; 30; 20 INJECTION, SOLUTION INTRAVENOUS at 05:56

## 2020-10-29 RX ADMIN — HYDROMORPHONE HYDROCHLORIDE 0.5 MG: 2 INJECTION, SOLUTION INTRAMUSCULAR; INTRAVENOUS; SUBCUTANEOUS at 12:49

## 2020-10-29 RX ADMIN — ONDANSETRON HYDROCHLORIDE 4 MG: 2 INJECTION, SOLUTION INTRAMUSCULAR; INTRAVENOUS at 18:07

## 2020-10-29 RX ADMIN — LORAZEPAM 1 MG: 2 INJECTION INTRAMUSCULAR; INTRAVENOUS at 15:31

## 2020-10-29 RX ADMIN — PANTOPRAZOLE SODIUM 40 MG: 40 INJECTION, POWDER, FOR SOLUTION INTRAVENOUS at 09:09

## 2020-10-29 RX ADMIN — PIPERACILLIN SODIUM AND TAZOBACTAM SODIUM 3.38 G: 3; .375 INJECTION, POWDER, LYOPHILIZED, FOR SOLUTION INTRAVENOUS at 00:25

## 2020-10-29 RX ADMIN — LORAZEPAM 1 MG: 1 TABLET ORAL at 21:55

## 2020-10-29 RX ADMIN — HYDROMORPHONE HYDROCHLORIDE 0.5 MG: 1 INJECTION, SOLUTION INTRAMUSCULAR; INTRAVENOUS; SUBCUTANEOUS at 03:26

## 2020-10-29 RX ADMIN — HYDROMORPHONE HYDROCHLORIDE 0.5 MG: 2 INJECTION, SOLUTION INTRAMUSCULAR; INTRAVENOUS; SUBCUTANEOUS at 22:38

## 2020-10-29 RX ADMIN — LORAZEPAM 1 MG: 2 INJECTION INTRAMUSCULAR; INTRAVENOUS at 18:07

## 2020-10-29 RX ADMIN — HYDROMORPHONE HYDROCHLORIDE 0.5 MG: 2 INJECTION, SOLUTION INTRAMUSCULAR; INTRAVENOUS; SUBCUTANEOUS at 15:30

## 2020-10-29 RX ADMIN — ENOXAPARIN SODIUM 40 MG: 40 INJECTION SUBCUTANEOUS at 08:58

## 2020-10-29 RX ADMIN — LORAZEPAM 2 MG: 2 TABLET ORAL at 05:25

## 2020-10-29 RX ADMIN — KETOROLAC TROMETHAMINE 15 MG: 30 INJECTION, SOLUTION INTRAMUSCULAR; INTRAVENOUS at 03:26

## 2020-10-29 RX ADMIN — SODIUM CHLORIDE, POTASSIUM CHLORIDE, SODIUM LACTATE AND CALCIUM CHLORIDE: 600; 310; 30; 20 INJECTION, SOLUTION INTRAVENOUS at 21:55

## 2020-10-29 RX ADMIN — Medication 10 ML: at 08:58

## 2020-10-29 RX ADMIN — HYDROMORPHONE HYDROCHLORIDE 0.5 MG: 2 INJECTION, SOLUTION INTRAMUSCULAR; INTRAVENOUS; SUBCUTANEOUS at 19:24

## 2020-10-29 RX ADMIN — HYDROMORPHONE HYDROCHLORIDE 0.5 MG: 1 INJECTION, SOLUTION INTRAMUSCULAR; INTRAVENOUS; SUBCUTANEOUS at 09:10

## 2020-10-29 ASSESSMENT — PAIN SCALES - GENERAL
PAINLEVEL_OUTOF10: 8
PAINLEVEL_OUTOF10: 7
PAINLEVEL_OUTOF10: 4
PAINLEVEL_OUTOF10: 7
PAINLEVEL_OUTOF10: 7
PAINLEVEL_OUTOF10: 6
PAINLEVEL_OUTOF10: 8

## 2020-10-29 ASSESSMENT — ENCOUNTER SYMPTOMS
ALLERGIC/IMMUNOLOGIC NEGATIVE: 1
EYES NEGATIVE: 1
RESPIRATORY NEGATIVE: 1
GASTROINTESTINAL NEGATIVE: 1

## 2020-10-29 ASSESSMENT — PAIN DESCRIPTION - ORIENTATION: ORIENTATION: MID

## 2020-10-29 ASSESSMENT — PAIN DESCRIPTION - PAIN TYPE
TYPE: ACUTE PAIN
TYPE: ACUTE PAIN

## 2020-10-29 ASSESSMENT — PAIN DESCRIPTION - FREQUENCY: FREQUENCY: INTERMITTENT

## 2020-10-29 ASSESSMENT — PAIN DESCRIPTION - LOCATION
LOCATION: ABDOMEN
LOCATION: ABDOMEN

## 2020-10-29 ASSESSMENT — PAIN DESCRIPTION - DESCRIPTORS: DESCRIPTORS: ACHING

## 2020-10-29 NOTE — PLAN OF CARE
Nutrition Problem #1: Altered GI function  Intervention: Food and/or Nutrient Delivery: Continue NPO  Nutritional Goals: pt will continue NPO diet order until medically cleared to advance

## 2020-10-29 NOTE — PROGRESS NOTES
CIWA score = 9 medicated with ativan 1 mg as ordered. Toradol given for pain as ordered. Patient up in room to bathroom. Will continue to monitor. AM assessment done.

## 2020-10-29 NOTE — PROGRESS NOTES
Admit: 10/28/2020    Name:  Juwan Chery  Room:  9175/8874-16  MRN:    [id number]     Daily Progress Note for 10/29/2020     Interval History:     Continues to have abd pain   Scheduled Meds:   pantoprazole  40 mg Intravenous Daily    sodium chloride flush  10 mL Intravenous 2 times per day    enoxaparin  40 mg Subcutaneous Daily    piperacillin-tazobactam  3.375 g Intravenous Q8H    nicotine  1 patch Transdermal Daily       Continuous Infusions:   sodium chloride Stopped (10/28/20 1635)    lactated ringers 150 mL/hr at 10/29/20 0556       PRN Meds:  HYDROmorphone, sodium chloride flush, acetaminophen, promethazine **OR** ondansetron, potassium chloride **OR** potassium alternative oral replacement **OR** potassium chloride, magnesium sulfate, HYDROmorphone, ketorolac, hydrALAZINE, LORazepam **OR** LORazepam **OR** LORazepam **OR** LORazepam **OR** LORazepam **OR** LORazepam **OR** LORazepam **OR** LORazepam                  Objective:     Temp  Av.8 °F (36.6 °C)  Min: 97 °F (36.1 °C)  Max: 98.5 °F (36.9 °C)  Pulse  Av.5  Min: 56  Max: 98  BP  Min: 136/123  Max: 193/107  SpO2  Av.4 %  Min: 90 %  Max: 100 %  No data found. Intake/Output Summary (Last 24 hours) at 10/29/2020 0731  Last data filed at 10/29/2020 0207  Gross per 24 hour   Intake --   Output 400 ml   Net -400 ml       Physical Exam:  Gen: Thin, ill-appearing male. Alert. Eyes: No conjunctival injection. ENT: No discharge. Pharynx clear. Neck: Trachea midline. Resp: No accessory muscle use. No crackles. no Wheezes. No rhonchi. CV: Regular rate. Regular rhythm. No murmur. No rub. No edema. Capillary Refill: Brisk,< 3 seconds   Peripheral Pulses: +2 palpable, equal bilaterally   GI: Diffuse abdominal tenderness to palpation. Voluntary guarding. non-distended. Normal bowel sounds. Skin: Warm and dry. Superficial abrasion to the volar aspect of the left thumb, mild surrounding erythema  M/S: No cyanosis.  No joint deformity. No clubbing. Neuro: Awake. Grossly nonfocal, bilateral upper extremity tremors, mild tongue fasciculations  Psych: Oriented x 3. No anxiety or agitation. Lab Data:  CBC:   Recent Labs     10/28/20  0843 10/29/20  0503   WBC 19.1* 8.5   RBC 5.50 4.42   HGB 17.4 14.2   HCT 51.3 42.7   MCV 93.3 96.5   RDW 13.7 13.8    226     BMP:   Recent Labs     10/28/20  0843 10/29/20  0503    135*   K 3.8 3.9   CL 97* 102   CO2 23 23   BUN 8 10   CREATININE 0.7* 0.6*     BNP: No results for input(s): BNP in the last 72 hours. PT/INR: No results for input(s): PROTIME, INR in the last 72 hours. APTT:No results for input(s): APTT in the last 72 hours. CARDIAC ENZYMES: No results for input(s): CKMB, CKMBINDEX, TROPONINI in the last 72 hours. Invalid input(s): CKTOTAL;3  FASTING LIPID PANEL:No results found for: CHOL, HDL, TRIG  LIVER PROFILE:   Recent Labs     10/28/20  0843   AST 75*   ALT 56*   BILITOT 0.7   ALKPHOS 137*       RADIOLOGY  CT ABDOMEN PELVIS WO CONTRAST Additional Contrast? None   Final Result   1. Large pancreatic carcinoma with associated duodenal invasion. 2. Nonspecific borderline enlarged right upper quadrant omental lymph nodes   and lingular pulmonary nodule concerning for metastatic involvement. PET   scan recommended for further evaluation.              Pertinent previous results reviewed   FINAL DIAGNOSIS:  Pancreatic Head Mass, Fine Needle Aspiration (1 Thin Prep, 6 Smears, 2 Cell  Block):  NEGATIVE FOR MALIGNANT CELLS. - Cellular aspirate with abundant acinar cells and features suggestive of  fat necrosis.       Assessment & Plan:     Patient Active Problem List    Diagnosis Date Noted    Acute pancreatitis with infected necrosis 10/28/2020    Acute on chronic pancreatitis (HCC)     Pancreatic mass     Leukocytosis     Elevated LFTs     [facility] disorder ([facility] [facility]     Hypertension     Acute pancreatitis without necrosis or infection, unspecified 01/05/2019    Suicidal ideation     Mood disorder ([facility] 06/05/2018    Gastroesophageal reflux disease without esophagitis 02/17/2016    Pruritic dermatitis 02/17/2016    Bipolar disorder in full remission ([facility] 11/08/2011    ETOH abuse     Tobacco abuse     Insomnia     Panic disorder     Low back pain     Anxiety 07/28/2011       Acute pancreatitis, suspected necrotic component  Pancreatic Mass  -Patient with a history of pancreatitis, secondary to alcohol abuse  -Has followed with Dr. Luba Melara, status post EUS with fine-needle aspiration 8/20 which showed negative for malignant cells and features suggestive of fat necrosis  -CT abdomen pelvis on 8/12 showed a pancreatic mass at 4 cm, repeat CT scan today shows a large pancreatic carcinoma with associated duodenal invasion measured at 5.5 cm--> concerning given increase in size  -Given biopsy results from prior procedure, will start Zosyn D#2 with elevated leukocytosis as well, lipase 611  -GI consult to Dr. Luba Melara  -Diet NPO  -IV fluids  -As needed IV pain medications  -Status post EUS with plexus block on 10/22/2020 with Dr. Luba Melara     Leukocytosis  -See above, suspect secondary to possible necrosis  -Continue IV antibiotics  Resolved   -Check blood culture     EtOH abuse  -Drinks approximately 12 pack of beer per day  -Last drink was 7 AM on 10/28, BAC negative   -Now with acute withdrawal symptoms with tremors and tongue fasciculations  -CIWA with as needed Ativan, start Librium when able to tolerate p.o.  -Fall and seizure precautions     Elevated LFTs  - suspect 2/2 above   - Trend      Psych   -Continue home medications when able to tolerate p.o.  -Chronically takes benzodiazepines, continue with IV Ativan as above     Tobacco Abuse   - Recommend cessation   - PRN Nicotine patch/gum      HTN  - BP is uncontrolled  -No prior history of hypertension, suspect component of elevation secondary to pain and alcohol withdrawal  -PRN hydralazine     DVT Prophylaxis: Lovenox   Diet: Diet NPO Effective Now   Code Status: Prior       Tristin Congo

## 2020-10-29 NOTE — PLAN OF CARE
Problem: Pain:  Goal: Pain level will decrease  10/29/2020 1157 by Jill Munoz RN  Outcome: Ongoing  10/29/2020 0458 by James Benjamin RN  Outcome: Ongoing  Goal: Control of acute pain  10/29/2020 1157 by Jill Munoz RN  Outcome: Ongoing  10/29/2020 0458 by James Benjamin RN  Outcome: Ongoing  Goal: Control of chronic pain  10/29/2020 1157 by Jill Munoz RN  Outcome: Ongoing  10/29/2020 0458 by James Benjamin RN  Outcome: Ongoing  Goal: Patient's pain/discomfort is manageable  Outcome: Ongoing     Problem: Falls - Risk of:  Goal: Will remain free from falls  10/29/2020 1157 by Jill Munoz RN  Outcome: Ongoing  10/29/2020 0458 by James Benjamin RN  Outcome: Ongoing  Goal: Absence of physical injury  10/29/2020 1157 by Jill Munoz RN  Outcome: Ongoing  10/29/2020 0458 by James Benjamin RN  Outcome: Ongoing     Problem: Nutrition  Goal: Optimal nutrition therapy  Outcome: Ongoing  Goal: Understanding of nutritional guidelines  Outcome: Ongoing     Problem: Infection:  Goal: Will remain free from infection  Outcome: Ongoing     Problem: Safety:  Goal: Free from accidental physical injury  Outcome: Ongoing  Goal: Free from intentional harm  Outcome: Ongoing     Problem: Daily Care:  Goal: Daily care needs are met  Outcome: Ongoing     Problem: Skin Integrity:  Goal: Skin integrity will stabilize  Outcome: Ongoing     Problem: Discharge Planning:  Goal: Patients continuum of care needs are met  Outcome: Ongoing

## 2020-10-29 NOTE — CONSULTS
Maternal Grandfather     Heart Disease Paternal Grandmother         MI    Stroke Paternal Grandmother     High Blood Pressure Paternal Grandmother     Alcohol Abuse Paternal Grandmother     Heart Disease Paternal Grandfather         MI    High Blood Pressure Paternal Grandfather     Alcohol Abuse Paternal Grandfather     Substance Abuse Paternal Grandfather     Alcohol Abuse Paternal Aunt     Alcohol Abuse Paternal Uncle        Scheduled Medications:    pantoprazole  40 mg Intravenous Daily    sodium chloride flush  10 mL Intravenous 2 times per day    enoxaparin  40 mg Subcutaneous Daily    piperacillin-tazobactam  3.375 g Intravenous Q8H    nicotine  1 patch Transdermal Daily     Infusions:    sodium chloride Stopped (10/28/20 1635)    lactated ringers 150 mL/hr at 10/29/20 0903     PRN Medications: HYDROmorphone, sodium chloride flush, acetaminophen, promethazine **OR** ondansetron, potassium chloride **OR** potassium alternative oral replacement **OR** potassium chloride, magnesium sulfate, ketorolac, hydrALAZINE, LORazepam **OR** LORazepam **OR** LORazepam **OR** LORazepam **OR** LORazepam **OR** LORazepam **OR** LORazepam **OR** LORazepam  Allergies: No Known Allergies    ROS:   Review of Systems   Constitutional: Negative. HENT: Negative. Eyes: Negative. Respiratory: Negative. Cardiovascular: Negative. Gastrointestinal: Negative. Endocrine: Negative. Genitourinary: Negative. Musculoskeletal: Negative. Skin: Negative. Allergic/Immunologic: Negative. Neurological: Negative. Hematological: Negative. Psychiatric/Behavioral: Negative.         Objective:     Physical Exam:   Vitals:    10/29/20 1315   BP: (!) 157/106   Pulse: 82   Resp: 18   Temp: 96.9 °F (36.1 °C)   SpO2: 99%     I/O last 3 completed shifts:  In: -   Out: 400 [Urine:400]   General appearance: alert, appears stated age and cooperative  HEENT: PERRLA  Neck: no adenopathy, no carotid bruit, no JVD, supple, symmetrical, trachea midline and thyroid not enlarged, symmetric, no tenderness/mass/nodules  Lungs: clear to auscultation bilaterally  Heart: regular rate and rhythm, S1, S2 normal, no murmur, click, rub or gallop  Abdomen: soft, non-tender; bowel sounds normal; no masses,  no organomegaly  Extremities: extremities normal, atraumatic, no cyanosis or edema     Lab and Imaging Review   Labs:  CBC:   Recent Labs     10/28/20  0843 10/29/20  0503   WBC 19.1* 8.5   HGB 17.4 14.2   HCT 51.3 42.7   MCV 93.3 96.5    226     BMP:   Recent Labs     10/28/20  0843 10/29/20  0503    135*   K 3.8 3.9   CL 97* 102   CO2 23 23   BUN 8 10   CREATININE 0.7* 0.6*     LIVER PROFILE:   Recent Labs     10/28/20  0843   AST 75*   ALT 56*   LIPASE 611.0*   PROT 7.4   BILITOT 0.7   ALKPHOS 137*     PT/INR: No results for input(s): INR in the last 72 hours. Invalid input(s): PT    IMAGING:  Ct Abdomen Pelvis Wo Contrast Additional Contrast? None    Result Date: 10/28/2020  EXAMINATION: CT OF THE ABDOMEN AND PELVIS WITHOUT CONTRAST 10/28/2020 9:56 am TECHNIQUE: CT of the abdomen and pelvis was performed without the administration of intravenous contrast. Multiplanar reformatted images are provided for review. Dose modulation, iterative reconstruction, and/or weight based adjustment of the mA/kV was utilized to reduce the radiation dose to as low as reasonably achievable. COMPARISON: 08/12/2020 HISTORY: ORDERING SYSTEM PROVIDED HISTORY: Upper quadrant abdominal pain, history of pancreatitis TECHNOLOGIST PROVIDED HISTORY: Reason for exam:->Upper quadrant abdominal pain, history of pancreatitis Additional Contrast?->None Reason for Exam: Upper quadrant abdominal pain, history of pancreatitis, vomiting Acuity: Chronic Type of Exam: Unknown FINDINGS: Lower Chest: There is no consolidation or effusion. There is a small noncalcified 3 mm nodule within the lingula. Organs:  There is a large lobular infiltrating soft tissue mass within the head of the pancreas, exhibiting dimensions of 5.5 by 5.5 cm. This results in invasion of the 2nd portion of the duodenum with luminal narrowing. The remainder of the solid abdominal organs are unremarkable. GI/Bowel: The remainder of the small bowel and colon are unremarkable. The appendix is normal. Pelvis: Bladder and prostate are within normal limits. Peritoneum/Retroperitoneum: There is no free air. There is ill-defined fluid surrounding the aforementioned pancreatic mass, tracking inferiorly into the right pericolic gutter. There are a few borderline enlarged right upper quadrant omental lymph nodes ranging in size up to 1.2 cm in diameter. Bones/Soft Tissues: There is no acute fracture or aggressive osseous lesion. 1. Large pancreatic carcinoma with associated duodenal invasion. 2. Nonspecific borderline enlarged right upper quadrant omental lymph nodes and lingular pulmonary nodule concerning for metastatic involvement. PET scan recommended for further evaluation. Hospital Problems           Last Modified POA    ETOH abuse 10/28/2020 Yes    Acute pancreatitis with infected necrosis 10/28/2020 Yes        Assessment:   38 yo male with EtOH pancreatitis    Plan:   1. Difficult to assess pancreas with active alcohol abuse due to inflammation. Biopsies previously were negative. Could attempt repeat EUS with elastography at [facility] but endoscopy booked by GI group for whole day tomorrow and no slots available. Likely beneficial to allow patient's pancreas to improve prior to repeat evaluation. Continue conservative measures, alcohol cessation. Can attempt EUS on Monday or Thursday as an outpatient next week if patient goes home over weekend.     Corky Gtz DO  2:14 PM 10/29/2020            [address] [place] [address]    [address]      [address] [place] [address]     Phone: [phone]     Fax: [phone]

## 2020-10-29 NOTE — PROGRESS NOTES
Comprehensive Nutrition Assessment    Type and Reason for Visit:  Initial, Positive Nutrition Screen(MST=4)    Nutrition Recommendations/Plan:  1. Continue NPO diet order  2. Monitor po intake, weight and nutrition related labs    Nutrition Assessment:  pt is nutritionally compromised AEB pt report of poor appetite and nausea PTA and pt is at risk for further compromise d/t NPO status r/t pancreatitis and hx of ETOH abuse; will monitor for diet progression    Malnutrition Assessment:  Malnutrition Status: At risk for malnutrition   Context:  Acute Illness     Findings of the 6 clinical characteristics of malnutrition:  Energy Intake:  Unable to assess  Weight Loss:  Unable to assess     Body Fat Loss:  Unable to assess(Covid 19 rule out)     Muscle Mass Loss:  Unable to assess(Covid 19 rule out)    Fluid Accumulation:  No significant fluid accumulation     Strength:  Not Performed    Estimated Daily Nutrient Needs:  Energy (kcal):  2523-6160 kcals/day based on 27-30 kcals/kg/CBW; Weight Used for Energy Requirements:  Current     Protein (g):  81-94 g protein/day based on 1.2-1.4 g/kg/CBW; Weight Used for Protein Requirements:  Current        Fluid (ml/day):  8697-8428 mls/day; Method Used for Fluid Requirements:  1 ml/kcal      Nutrition Related Findings:  pt reported drinking 12 pack of beer per day; hx of pancreatits, HLD, HTN, poly substance abuse and depression; abdomen is flat, soft, tender w/ active bowel sounds; poor dentition and last bm 10/28/20; currently on protonix, dilaudid, toradol, ativan and zofran,  ml/hr;  Na 135; CT showed large pancreatic mass but aspirate showed non-cancerous      Wounds:  None       Current Nutrition Therapies:    Diet NPO Effective Now Exceptions are: Ice Chips    Anthropometric Measures:  · Height: 6' (182.9 cm)  · Current Body Weight: 148 lb 3.2 oz (67.2 kg)(10/28/20)   · Admission Body Weight: 148 lb 3.2 oz (67.2 kg)(10/28/20)    · Usual Body Weight: (unknown)

## 2020-10-30 LAB
A/G RATIO: 1.7 (ref 1.1–2.2)
ALBUMIN SERPL-MCNC: 3.7 G/DL (ref 3.4–5)
ALP BLD-CCNC: 88 U/L (ref 40–129)
ALT SERPL-CCNC: 26 U/L (ref 10–40)
ANION GAP SERPL CALCULATED.3IONS-SCNC: 10 MMOL/L (ref 3–16)
AST SERPL-CCNC: 22 U/L (ref 15–37)
BILIRUB SERPL-MCNC: 0.8 MG/DL (ref 0–1)
BUN BLDV-MCNC: 8 MG/DL (ref 7–20)
CALCIUM SERPL-MCNC: 9.4 MG/DL (ref 8.3–10.6)
CHLORIDE BLD-SCNC: 97 MMOL/L (ref 99–110)
CO2: 25 MMOL/L (ref 21–32)
CREAT SERPL-MCNC: 0.6 MG/DL (ref 0.9–1.3)
GFR AFRICAN AMERICAN: >60
GFR NON-AFRICAN AMERICAN: >60
GLOBULIN: 2.2 G/DL
GLUCOSE BLD-MCNC: 83 MG/DL (ref 70–99)
POTASSIUM SERPL-SCNC: 3.8 MMOL/L (ref 3.5–5.1)
SODIUM BLD-SCNC: 132 MMOL/L (ref 136–145)
TOTAL PROTEIN: 5.9 G/DL (ref 6.4–8.2)

## 2020-10-30 PROCEDURE — 2580000003 HC RX 258: Performed by: PHYSICIAN ASSISTANT

## 2020-10-30 PROCEDURE — 1200000000 HC SEMI PRIVATE

## 2020-10-30 PROCEDURE — C9113 INJ PANTOPRAZOLE SODIUM, VIA: HCPCS | Performed by: PHYSICIAN ASSISTANT

## 2020-10-30 PROCEDURE — 6360000002 HC RX W HCPCS: Performed by: PHYSICIAN ASSISTANT

## 2020-10-30 PROCEDURE — 6360000002 HC RX W HCPCS: Performed by: INTERNAL MEDICINE

## 2020-10-30 PROCEDURE — 6370000000 HC RX 637 (ALT 250 FOR IP): Performed by: PHYSICIAN ASSISTANT

## 2020-10-30 PROCEDURE — 99232 SBSQ HOSP IP/OBS MODERATE 35: CPT | Performed by: INTERNAL MEDICINE

## 2020-10-30 PROCEDURE — 80053 COMPREHEN METABOLIC PANEL: CPT

## 2020-10-30 PROCEDURE — 36415 COLL VENOUS BLD VENIPUNCTURE: CPT

## 2020-10-30 RX ORDER — MORPHINE SULFATE 2 MG/ML
2 INJECTION, SOLUTION INTRAMUSCULAR; INTRAVENOUS EVERY 4 HOURS PRN
Status: DISCONTINUED | OUTPATIENT
Start: 2020-10-30 | End: 2020-10-31

## 2020-10-30 RX ADMIN — MORPHINE SULFATE 2 MG: 2 INJECTION, SOLUTION INTRAMUSCULAR; INTRAVENOUS at 23:48

## 2020-10-30 RX ADMIN — ONDANSETRON HYDROCHLORIDE 4 MG: 2 INJECTION, SOLUTION INTRAMUSCULAR; INTRAVENOUS at 12:58

## 2020-10-30 RX ADMIN — PIPERACILLIN SODIUM AND TAZOBACTAM SODIUM 3.38 G: 3; .375 INJECTION, POWDER, LYOPHILIZED, FOR SOLUTION INTRAVENOUS at 18:38

## 2020-10-30 RX ADMIN — PANTOPRAZOLE SODIUM 40 MG: 40 INJECTION, POWDER, FOR SOLUTION INTRAVENOUS at 08:24

## 2020-10-30 RX ADMIN — LORAZEPAM 1 MG: 1 TABLET ORAL at 03:00

## 2020-10-30 RX ADMIN — ONDANSETRON HYDROCHLORIDE 4 MG: 2 INJECTION, SOLUTION INTRAMUSCULAR; INTRAVENOUS at 19:25

## 2020-10-30 RX ADMIN — PIPERACILLIN SODIUM AND TAZOBACTAM SODIUM 3.38 G: 3; .375 INJECTION, POWDER, LYOPHILIZED, FOR SOLUTION INTRAVENOUS at 08:24

## 2020-10-30 RX ADMIN — MORPHINE SULFATE 2 MG: 2 INJECTION, SOLUTION INTRAMUSCULAR; INTRAVENOUS at 14:50

## 2020-10-30 RX ADMIN — ENOXAPARIN SODIUM 40 MG: 40 INJECTION SUBCUTANEOUS at 08:25

## 2020-10-30 RX ADMIN — PIPERACILLIN SODIUM AND TAZOBACTAM SODIUM 3.38 G: 3; .375 INJECTION, POWDER, LYOPHILIZED, FOR SOLUTION INTRAVENOUS at 01:45

## 2020-10-30 RX ADMIN — MORPHINE SULFATE 2 MG: 2 INJECTION, SOLUTION INTRAMUSCULAR; INTRAVENOUS at 19:25

## 2020-10-30 RX ADMIN — Medication 10 ML: at 08:26

## 2020-10-30 RX ADMIN — Medication 10 ML: at 20:00

## 2020-10-30 RX ADMIN — HYDROMORPHONE HYDROCHLORIDE 0.5 MG: 2 INJECTION, SOLUTION INTRAMUSCULAR; INTRAVENOUS; SUBCUTANEOUS at 08:21

## 2020-10-30 RX ADMIN — HYDROMORPHONE HYDROCHLORIDE 0.5 MG: 2 INJECTION, SOLUTION INTRAMUSCULAR; INTRAVENOUS; SUBCUTANEOUS at 01:45

## 2020-10-30 RX ADMIN — HYDROMORPHONE HYDROCHLORIDE 0.5 MG: 2 INJECTION, SOLUTION INTRAMUSCULAR; INTRAVENOUS; SUBCUTANEOUS at 04:51

## 2020-10-30 RX ADMIN — LORAZEPAM 1 MG: 1 TABLET ORAL at 11:17

## 2020-10-30 RX ADMIN — SODIUM CHLORIDE 1000 ML: 9 INJECTION, SOLUTION INTRAVENOUS at 08:26

## 2020-10-30 ASSESSMENT — PAIN DESCRIPTION - PAIN TYPE
TYPE: ACUTE PAIN

## 2020-10-30 ASSESSMENT — PAIN DESCRIPTION - LOCATION
LOCATION: ABDOMEN

## 2020-10-30 ASSESSMENT — PAIN SCALES - GENERAL
PAINLEVEL_OUTOF10: 4
PAINLEVEL_OUTOF10: 7
PAINLEVEL_OUTOF10: 7
PAINLEVEL_OUTOF10: 5
PAINLEVEL_OUTOF10: 3
PAINLEVEL_OUTOF10: 7
PAINLEVEL_OUTOF10: 7
PAINLEVEL_OUTOF10: 3
PAINLEVEL_OUTOF10: 0
PAINLEVEL_OUTOF10: 2
PAINLEVEL_OUTOF10: 7

## 2020-10-30 ASSESSMENT — PAIN DESCRIPTION - DESCRIPTORS: DESCRIPTORS: DISCOMFORT

## 2020-10-30 NOTE — PROGRESS NOTES
Admit: 10/28/2020    Name:  Blaine Reyes  Room:  [facility]5/0628-26  MRN:    [id number]     Daily Progress Note for 10/30/2020     Interval History:     Says abd pain is better  Wishes to go home today      Scheduled Meds:   pantoprazole  40 mg Intravenous Daily    sodium chloride flush  10 mL Intravenous 2 times per day    enoxaparin  40 mg Subcutaneous Daily    piperacillin-tazobactam  3.375 g Intravenous Q8H    nicotine  1 patch Transdermal Daily       Continuous Infusions:   sodium chloride 1,000 mL (10/30/20 0826)    lactated ringers 150 mL/hr at 10/29/20 2155       PRN Meds:  HYDROmorphone, sodium chloride flush, acetaminophen, promethazine **OR** ondansetron, potassium chloride **OR** potassium alternative oral replacement **OR** potassium chloride, magnesium sulfate, hydrALAZINE, LORazepam **OR** LORazepam **OR** LORazepam **OR** LORazepam **OR** LORazepam **OR** LORazepam **OR** LORazepam **OR** LORazepam                  Objective:     Temp  Av.4 °F (36.3 °C)  Min: 96.9 °F (36.1 °C)  Max: 98.3 °F (36.8 °C)  Pulse  Av  Min: 56  Max: 82  BP  Min: 138/88  Max: 168/102  SpO2  Av %  Min: 98 %  Max: 100 %  No data found. Intake/Output Summary (Last 24 hours) at 10/30/2020 1126  Last data filed at 10/30/2020 0152  Gross per 24 hour   Intake 1262.05 ml   Output 450 ml   Net 812.05 ml       Physical Exam:  Gen: Thin, ill-appearing male. Alert. Eyes: No conjunctival injection. ENT: No discharge. Pharynx clear. Neck: Trachea midline. Resp: No accessory muscle use. No crackles. no Wheezes. No rhonchi. CV: Regular rate. Regular rhythm. No murmur. No rub. No edema. GI: minimal epigastric tenderness  Voluntary guarding. non-distended. Normal bowel sounds. Skin: Warm and dry. Superficial abrasion to the volar aspect of the left thumb, mild surrounding erythema  M/S: No cyanosis. No joint deformity. No clubbing. Neuro: Awake.  Grossly nonfocal, bilateral upper extremity tremors, mild tongue fasciculations  Psych: Oriented x 3. No anxiety or agitation. Lab Data:  CBC:   Recent Labs     10/28/20  0843 10/29/20  0503   WBC 19.1* 8.5   RBC 5.50 4.42   HGB 17.4 14.2   HCT 51.3 42.7   MCV 93.3 96.5   RDW 13.7 13.8    226     BMP:   Recent Labs     10/28/20  0843 10/29/20  0503 10/30/20  0510    135* 132*   K 3.8 3.9 3.8   CL 97* 102 97*   CO2 23 23 25   BUN 8 10 8   CREATININE 0.7* 0.6* 0.6*     BNP: No results for input(s): BNP in the last 72 hours. PT/INR: No results for input(s): PROTIME, INR in the last 72 hours. APTT:No results for input(s): APTT in the last 72 hours. CARDIAC ENZYMES: No results for input(s): CKMB, CKMBINDEX, TROPONINI in the last 72 hours. Invalid input(s): CKTOTAL;3  FASTING LIPID PANEL:  Lab Results   Component Value Date    TRIG 135 10/29/2020     LIVER PROFILE:   Recent Labs     10/28/20  0843 10/30/20  0510   AST 75* 22   ALT 56* 26   BILITOT 0.7 0.8   ALKPHOS 137* 88       RADIOLOGY  CT ABDOMEN PELVIS WO CONTRAST Additional Contrast? None   Final Result   1. Large pancreatic carcinoma with associated duodenal invasion. 2. Nonspecific borderline enlarged right upper quadrant omental lymph nodes   and lingular pulmonary nodule concerning for metastatic involvement. PET   scan recommended for further evaluation.              Pertinent previous results reviewed   FINAL DIAGNOSIS:  Pancreatic Head Mass, Fine Needle Aspiration (1 Thin Prep, 6 Smears, 2 Cell  Block):  NEGATIVE FOR MALIGNANT CELLS. - Cellular aspirate with abundant acinar cells and features suggestive of  fat necrosis.       Assessment & Plan:     Patient Active Problem List    Diagnosis Date Noted    Acute pancreatitis with infected necrosis 10/28/2020    Acute on chronic pancreatitis (HCC)     Pancreatic mass     Leukocytosis     Elevated LFTs     Bipolar 1 disorder ([facility]     Hypertension     Acute pancreatitis without necrosis or infection, unspecified 01/05/2019    Suicidal ideation     Mood disorder ([facility] 06/05/2018    Gastroesophageal reflux disease without esophagitis 02/17/2016    Pruritic dermatitis 02/17/2016    Bipolar disorder in full remission ([facility] 11/08/2011    ETOH abuse     Tobacco abuse     Insomnia     Panic disorder     Low back pain     Anxiety 07/28/2011       Acute pancreatitis, suspected necrotic component  Pancreatic Mass  -Patient with a history of pancreatitis, secondary to alcohol abuse  -Has followed with Dr. Dale Chan, status post EUS with fine-needle aspiration 8/20 which showed negative for malignant cells and features suggestive of fat necrosis  -CT abdomen pelvis on 8/12 showed a pancreatic mass at 4 cm, repeat CT scan today shows a large pancreatic carcinoma with associated duodenal invasion measured at 5.5 cm--> concerning given increase in size  -Given biopsy results from prior procedure, will start Zosyn D#3 with elevated leukocytosis as well, lipase 611  -GI consult to Dr. Dale Chan  -Diet NPO  -IV fluids  -As needed IV pain medications  -Status post EUS with plexus block on 10/22/2020 with Dr. Dale Chan  abd pain is much better today  Start clears      Leukocytosis  -See above, suspect secondary to possible necrosis  -Continue IV antibiotics  Resolved   -Check blood culture  negative     EtOH abuse  -Drinks approximately 12 pack of beer per day  -Last drink was 7 AM on 10/28, BAC negative   -Now with acute withdrawal symptoms with tremors and tongue fasciculations  -CIWA with as needed Ativan, start Librium when able to tolerate p.o.  -Fall and seizure precautions  Doing well. no withdrawal symptoms today     Elevated LFTs  - suspect 2/2 above   - Trend      Psych   -Continue home medications when able to tolerate p.o.  -Chronically takes benzodiazepines, continue with IV Ativan as above     Tobacco Abuse   - Recommend cessation   - PRN Nicotine patch/gum      HTN  - BP is uncontrolled  -No prior history of hypertension, suspect component of elevation secondary to pain and alcohol withdrawal  -PRN hydralazine     DVT Prophylaxis: Lovenox   Diet: clears  Code Status: Prior       D/c home if he tolerates diet     Tristin Congo

## 2020-10-30 NOTE — FLOWSHEET NOTE
10/30/20 0714   Vital Signs   Temp 97.4 °F (36.3 °C)   Temp Source Oral   Pulse 57   Heart Rate Source Monitor   Resp 16   BP (!) 168/102   BP Location Right upper arm   BP Upper/Lower Upper   Patient Position Sitting   Level of Consciousness 0   MEWS Score 1   Patient Currently in Pain Yes   Oxygen Therapy   SpO2 98 %   O2 Device None (Room air)   Shift assessment complete. See flow sheet. Scheduled meds given. See MAR. Call light and bedside table within reach. No further needs noted at this time. Will continue to monitor.

## 2020-10-30 NOTE — FLOWSHEET NOTE
10/29/20 1956   Vital Signs   Temp 98.3 °F (36.8 °C)   Temp Source Oral   Pulse 56   Heart Rate Source Monitor   Resp 16   BP (!) 151/81   Patient Position Semi fowlers   Level of Consciousness 0   MEWS Score 1   Oxygen Therapy   SpO2 100 %   O2 Device None (Room air)   Pt A/O x 4 assessment completed. IVF infusing. CIWA score 10 1 mg PO Ativan given. Pt denies any other needs.  Call light within reach

## 2020-10-30 NOTE — PLAN OF CARE
Problem: Pain:  Goal: Pain level will decrease  Description: Pain level will decrease  39/09/1942 [id number] by Evans Pena RN  Outcome: Ongoing  10/30/2020 0010 by Uyen Hale RN  Outcome: Ongoing  Goal: Control of acute pain  Description: Control of acute pain  11/96/7274 1258 by Evans Pena RN  Outcome: Ongoing  10/30/2020 0010 by Uyen Hale RN  Outcome: Ongoing  Goal: Control of chronic pain  Description: Control of chronic pain  Outcome: Ongoing  Goal: Patient's pain/discomfort is manageable  Description: Patient's pain/discomfort is manageable  Outcome: Ongoing     Problem: Falls - Risk of:  Goal: Will remain free from falls  Description: Will remain free from falls  27/29/0892 0484 by Evans Pena RN  Outcome: Ongoing  10/30/2020 0010 by Uyen Hale RN  Outcome: Ongoing  Goal: Absence of physical injury  Description: Absence of physical injury  Outcome: Ongoing     Problem: Nutrition  Goal: Optimal nutrition therapy  Outcome: Ongoing  Goal: Understanding of nutritional guidelines  Outcome: Ongoing     Problem: Infection:  Goal: Will remain free from infection  Description: Will remain free from infection  Outcome: Ongoing     Problem: Safety:  Goal: Free from accidental physical injury  Description: Free from accidental physical injury  Outcome: Ongoing  Goal: Free from intentional harm  Description: Free from intentional harm  Outcome: Ongoing     Problem: Daily Care:  Goal: Daily care needs are met  Description: Daily care needs are met  18/46/6020 8549 by Evans Pena RN  Outcome: Ongoing  10/30/2020 0010 by Uyen Hale RN  Outcome: Ongoing     Problem: Skin Integrity:  Goal: Skin integrity will stabilize  Description: Skin integrity will stabilize  Outcome: Ongoing     Problem: Discharge Planning:  Goal: Patients continuum of care needs are met  Description: Patients continuum of care needs are met  99/75/9090 3616 by Evans Pena RN  Outcome: Ongoing  10/30/2020 0010 by Uyen Hale RN  Outcome: Ongoing     Problem: Discharge Planning:  Goal: Discharged to appropriate level of care  Description: Discharged to appropriate level of care  Outcome: Ongoing     Problem: Fluid Volume - Deficit:  Goal: Absence of fluid volume deficit signs and symptoms  Description: Absence of fluid volume deficit signs and symptoms  Outcome: Ongoing     Problem: Nutrition Deficit:  Goal: Ability to achieve adequate nutritional intake will improve  Description: Ability to achieve adequate nutritional intake will improve  Outcome: Ongoing     Problem: Sleep Pattern Disturbance:  Goal: Appears well-rested  Description: Appears well-rested  Outcome: Ongoing     Problem: Violence - Risk of, Self/Other-Directed:  Goal: Knowledge of developmental care interventions  Description: Absence of violence  Outcome: Ongoing

## 2020-10-30 NOTE — PROGRESS NOTES
Bedside report given to [facility] pt in stable condition no needs at this time.  Call light within reach

## 2020-10-30 NOTE — PLAN OF CARE
Problem: Pain:  Goal: Pain level will decrease  Description: Pain level will decrease  10/30/2020 0010 by Oswaldo Mari RN  Outcome: Ongoing  10/29/2020 1157 by Curlene Nageotte, RN  Outcome: Ongoing  Goal: Control of acute pain  Description: Control of acute pain  10/30/2020 0010 by Oswaldo Mari RN  Outcome: Ongoing  10/29/2020 1157 by Curlene Nageotte, RN  Outcome: Ongoing  Goal: Control of chronic pain  Description: Control of chronic pain  10/29/2020 1157 by Curlene Nageotte, RN  Outcome: Ongoing  Goal: Patient's pain/discomfort is manageable  Description: Patient's pain/discomfort is manageable  10/29/2020 1157 by Curlene Nageotte, RN  Outcome: Ongoing     Problem: Falls - Risk of:  Goal: Will remain free from falls  Description: Will remain free from falls  10/30/2020 0010 by Oswaldo Mari RN  Outcome: Ongoing  10/29/2020 1157 by Curlene Nageotte, RN  Outcome: Ongoing  Goal: Absence of physical injury  Description: Absence of physical injury  10/29/2020 1157 by Curlene Nageotte, RN  Outcome: Ongoing     Problem: Nutrition  Goal: Optimal nutrition therapy  10/29/2020 1157 by Curlene Nageotte, RN  Outcome: Ongoing  Goal: Understanding of nutritional guidelines  10/29/2020 1157 by Curlene Nageotte, RN  Outcome: Ongoing     Problem: Infection:  Goal: Will remain free from infection  Description: Will remain free from infection  10/29/2020 1157 by Curlene Nageotte, RN  Outcome: Ongoing     Problem: Safety:  Goal: Free from accidental physical injury  Description: Free from accidental physical injury  10/29/2020 1157 by Curlene Nageotte, RN  Outcome: Ongoing  Goal: Free from intentional harm  Description: Free from intentional harm  10/29/2020 1157 by Curlene Nageotte, RN  Outcome: Ongoing     Problem: Daily Care:  Goal: Daily care needs are met  Description: Daily care needs are met  10/30/2020 0010 by Oswaldo Mari RN  Outcome: Ongoing  10/29/2020 1157 by Curlene Nageotte, RN  Outcome: Ongoing Problem: Skin Integrity:  Goal: Skin integrity will stabilize  Description: Skin integrity will stabilize  10/29/2020 1157 by Boubacar Cerda RN  Outcome: Ongoing     Problem: Discharge Planning:  Goal: Patients continuum of care needs are met  Description: Patients continuum of care needs are met  10/30/2020 0010 by Giselle Prince RN  Outcome: Ongoing  10/29/2020 1157 by Boubacar Cerda RN  Outcome: Ongoing

## 2020-10-31 VITALS
HEIGHT: 72 IN | SYSTOLIC BLOOD PRESSURE: 142 MMHG | DIASTOLIC BLOOD PRESSURE: 102 MMHG | BODY MASS INDEX: 20.07 KG/M2 | TEMPERATURE: 97.1 F | WEIGHT: 148.2 LBS | HEART RATE: 70 BPM | RESPIRATION RATE: 17 BRPM | OXYGEN SATURATION: 99 %

## 2020-10-31 PROCEDURE — 2580000003 HC RX 258: Performed by: PHYSICIAN ASSISTANT

## 2020-10-31 PROCEDURE — 6360000002 HC RX W HCPCS: Performed by: PHYSICIAN ASSISTANT

## 2020-10-31 PROCEDURE — U0003 INFECTIOUS AGENT DETECTION BY NUCLEIC ACID (DNA OR RNA); SEVERE ACUTE RESPIRATORY SYNDROME CORONAVIRUS 2 (SARS-COV-2) (CORONAVIRUS DISEASE [COVID-19]), AMPLIFIED PROBE TECHNIQUE, MAKING USE OF HIGH THROUGHPUT TECHNOLOGIES AS DESCRIBED BY CMS-2020-01-R: HCPCS

## 2020-10-31 PROCEDURE — 99239 HOSP IP/OBS DSCHRG MGMT >30: CPT | Performed by: INTERNAL MEDICINE

## 2020-10-31 PROCEDURE — 6360000002 HC RX W HCPCS: Performed by: INTERNAL MEDICINE

## 2020-10-31 PROCEDURE — 6370000000 HC RX 637 (ALT 250 FOR IP): Performed by: PHYSICIAN ASSISTANT

## 2020-10-31 PROCEDURE — C9113 INJ PANTOPRAZOLE SODIUM, VIA: HCPCS | Performed by: PHYSICIAN ASSISTANT

## 2020-10-31 RX ORDER — AMOXICILLIN AND CLAVULANATE POTASSIUM 500; 125 MG/1; MG/1
1 TABLET, FILM COATED ORAL 3 TIMES DAILY
Qty: 15 TABLET | Refills: 0 | Status: SHIPPED | OUTPATIENT
Start: 2020-10-31 | End: 2020-11-05

## 2020-10-31 RX ADMIN — Medication 10 ML: at 10:26

## 2020-10-31 RX ADMIN — MORPHINE SULFATE 2 MG: 2 INJECTION, SOLUTION INTRAMUSCULAR; INTRAVENOUS at 04:08

## 2020-10-31 RX ADMIN — PANTOPRAZOLE SODIUM 40 MG: 40 INJECTION, POWDER, FOR SOLUTION INTRAVENOUS at 08:23

## 2020-10-31 RX ADMIN — PIPERACILLIN SODIUM AND TAZOBACTAM SODIUM 3.38 G: 3; .375 INJECTION, POWDER, LYOPHILIZED, FOR SOLUTION INTRAVENOUS at 02:30

## 2020-10-31 RX ADMIN — PIPERACILLIN SODIUM AND TAZOBACTAM SODIUM 3.38 G: 3; .375 INJECTION, POWDER, LYOPHILIZED, FOR SOLUTION INTRAVENOUS at 10:25

## 2020-10-31 RX ADMIN — Medication 10 ML: at 08:24

## 2020-10-31 RX ADMIN — ENOXAPARIN SODIUM 40 MG: 40 INJECTION SUBCUTANEOUS at 08:23

## 2020-10-31 ASSESSMENT — PAIN DESCRIPTION - LOCATION
LOCATION: ABDOMEN

## 2020-10-31 ASSESSMENT — PAIN SCALES - GENERAL
PAINLEVEL_OUTOF10: 8
PAINLEVEL_OUTOF10: 3
PAINLEVEL_OUTOF10: 1
PAINLEVEL_OUTOF10: 3

## 2020-10-31 ASSESSMENT — PAIN DESCRIPTION - DESCRIPTORS: DESCRIPTORS: DISCOMFORT

## 2020-10-31 ASSESSMENT — PAIN DESCRIPTION - PAIN TYPE
TYPE: ACUTE PAIN

## 2020-10-31 NOTE — CARE COORDINATION
RN CM noted discharge order. No needs identified. Please consult if needs arise.
Trey Friedmanr declined Lupron  He will need to be scheduled for [facility]/ fiducial markers 
Clinic, Cardio/Pul [address]) None    DISCHARGE PLAN: Explained Case Management role/services. Reviewed chart and met with pt at bedside. Role of CM explained.  lives in [address] [place] [address] and has 10-15 steps. States is IPTA. Dad provides transportation. Discussed inpt and outpt ETOH rehab and pt declines states has been at inpt rehab and outpt and it doesn't help. Resource folder given. Pt is Indep. Declines assist with ETOH rehab. CM will not follow.  Please advise if CM needed

## 2020-10-31 NOTE — PROGRESS NOTES
Pt tolerated ice chips without problems. He has been asking for pain meds when they are due. No c/o nausea.

## 2020-10-31 NOTE — PROGRESS NOTES
Admit: 10/28/2020    Name:  Dalton Lafleur  Room:  [facility]5869-47  MRN:    [id number]     Daily Progress Note for 10/31/2020     Interval History:     Had pain yesterday with clear liquids   made NPO Again    Now he denies pain    Scheduled Meds:   pantoprazole  40 mg Intravenous Daily    sodium chloride flush  10 mL Intravenous 2 times per day    enoxaparin  40 mg Subcutaneous Daily    piperacillin-tazobactam  3.375 g Intravenous Q8H    nicotine  1 patch Transdermal Daily       Continuous Infusions:      PRN Meds:  morphine, sodium chloride flush, acetaminophen, promethazine **OR** ondansetron, potassium chloride **OR** potassium alternative oral replacement **OR** potassium chloride, magnesium sulfate, hydrALAZINE                  Objective:     Temp  Av.8 °F (36.6 °C)  Min: 97.2 °F (36.2 °C)  Max: 98.1 °F (36.7 °C)  Pulse  Av  Min: 56  Max: 79  BP  Min: 156/99  Max: 170/97  SpO2  Av.3 %  Min: 99 %  Max: 100 %  Patient Vitals for the past 4 hrs:   BP Temp Pulse Resp SpO2   10/31/20 0408 (!) 156/99 98 °F (36.7 °C) 60 16 99 %         Intake/Output Summary (Last 24 hours) at 10/31/2020 0726  Last data filed at 10/30/2020 1846  Gross per 24 hour   Intake 1900 ml   Output --   Net 1900 ml       Physical Exam:  Gen: Thin, ill-appearing male. Alert. Eyes: No conjunctival injection. ENT: No discharge. Pharynx clear. Neck: Trachea midline. Resp: No accessory muscle use. No crackles. no Wheezes. No rhonchi. CV: Regular rate. Regular rhythm. No murmur. No rub. No edema. GI:no  epigastric tenderness    non-distended. Normal bowel sounds. Skin: Warm and dry. Superficial abrasion to the volar aspect of the left thumb, mild surrounding erythema  M/S: No cyanosis. No joint deformity. No clubbing. Neuro: Awake. Grossly nonfocal, bilateral upper extremity tremors, mild tongue fasciculations  Psych: Oriented x 3. No anxiety or agitation.      Lab Data:  CBC:   Recent Labs     10/28/20  1025 10/29/20  0503   WBC 19.1* 8.5   RBC 5.50 4.42   HGB 17.4 14.2   HCT 51.3 42.7   MCV 93.3 96.5   RDW 13.7 13.8    226     BMP:   Recent Labs     10/28/20  0843 10/29/20  0503 10/30/20  0510    135* 132*   K 3.8 3.9 3.8   CL 97* 102 97*   CO2 23 23 25   BUN 8 10 8   CREATININE 0.7* 0.6* 0.6*     BNP: No results for input(s): BNP in the last 72 hours. PT/INR: No results for input(s): PROTIME, INR in the last 72 hours. APTT:No results for input(s): APTT in the last 72 hours. CARDIAC ENZYMES: No results for input(s): CKMB, CKMBINDEX, TROPONINI in the last 72 hours. Invalid input(s): CKTOTAL;3  FASTING LIPID PANEL:  Lab Results   Component Value Date    TRIG 135 10/29/2020     LIVER PROFILE:   Recent Labs     10/28/20  0843 10/30/20  0510   AST 75* 22   ALT 56* 26   BILITOT 0.7 0.8   ALKPHOS 137* 88       RADIOLOGY  CT ABDOMEN PELVIS WO CONTRAST Additional Contrast? None   Final Result   1. Large pancreatic carcinoma with associated duodenal invasion. 2. Nonspecific borderline enlarged right upper quadrant omental lymph nodes   and lingular pulmonary nodule concerning for metastatic involvement. PET   scan recommended for further evaluation.              Pertinent previous results reviewed   FINAL DIAGNOSIS:  Pancreatic Head Mass, Fine Needle Aspiration (1 Thin Prep, 6 Smears, 2 Cell  Block):  NEGATIVE FOR MALIGNANT CELLS. - Cellular aspirate with abundant acinar cells and features suggestive of  fat necrosis.       Assessment & Plan:     Patient Active Problem List    Diagnosis Date Noted    Acute pancreatitis with infected necrosis 10/28/2020    Acute on chronic pancreatitis (HCC)     Pancreatic mass     Leukocytosis     Elevated LFTs     Bipolar 1 disorder ([facility]     Hypertension     Acute pancreatitis without necrosis or infection, unspecified 01/05/2019    Suicidal ideation     Mood disorder ([facility] 06/05/2018    Gastroesophageal reflux disease without esophagitis 02/17/2016    Pruritic dermatitis 02/17/2016    Bipolar disorder in full remission ([facility] 11/08/2011    ETOH abuse     Tobacco abuse     Insomnia     Panic disorder     Low back pain     Anxiety 07/28/2011       Acute pancreatitis, suspected necrotic component  Pancreatic Mass  -Patient with a history of pancreatitis, secondary to alcohol abuse  -Has followed with Dr. Batsheva Matute, status post EUS with fine-needle aspiration 8/20 which showed negative for malignant cells and features suggestive of fat necrosis  -CT abdomen pelvis on 8/12 showed a pancreatic mass at 4 cm, repeat CT scan today shows a large pancreatic carcinoma with associated duodenal invasion measured at 5.5 cm--> concerning given increase in size  -Given biopsy results from prior procedure, will start Zosyn D#3 with elevated leukocytosis as well, lipase 611  -GI consult to Dr. Batsheva Matute  -Diet NPO  -IV fluids  -As needed IV pain medications  -Status post EUS with plexus block on 10/22/2020 with Dr. Batsheva Matute  abd pain worse with clears yesterday- made NPO again  Now pain resolved  Start clears      Leukocytosis  -See above, suspect secondary to possible necrosis  -Continue IV antibiotics  Resolved   -Check blood culture  negative     EtOH abuse  -Drinks approximately 12 pack of beer per day  -Last drink was 7 AM on 10/28, BAC negative   -Now with acute withdrawal symptoms with tremors and tongue fasciculations  -CIWA with as needed Ativan, start Librium when able to tolerate p.o.  -Fall and seizure precautions  Doing well. no withdrawal symptoms today     Elevated LFTs  - suspect 2/2 above   - Trend      Psych   -Continue home medications when able to tolerate p.o.  -Chronically takes benzodiazepines, continue with IV Ativan as above     Tobacco Abuse   - Recommend cessation   - PRN Nicotine patch/gum      HTN  - BP is uncontrolled  -No prior history of hypertension, suspect component of elevation secondary to pain and alcohol withdrawal  -PRN hydralazine     DVT Prophylaxis: Lovenox   Diet: clears  Code Status: Prior       D/c home if he tolerates diet     Marisela Kunz

## 2020-10-31 NOTE — PROGRESS NOTES
Patient discharged to home, at 1420. Discharge instructions reviewed with patient. Patient verbalized understanding. IV removed. Patient tolerated well. Prescriptions reviewed with patient.

## 2020-10-31 NOTE — PLAN OF CARE
Problem: Pain:  Goal: Pain level will decrease  Description: Pain level will decrease  Outcome: Ongoing  Goal: Control of acute pain  Description: Control of acute pain  Outcome: Ongoing  Goal: Control of chronic pain  Description: Control of chronic pain  Outcome: Ongoing  Goal: Patient's pain/discomfort is manageable  Description: Patient's pain/discomfort is manageable  Outcome: Ongoing     Problem: Falls - Risk of:  Goal: Will remain free from falls  Description: Will remain free from falls  Outcome: Ongoing  Goal: Absence of physical injury  Description: Absence of physical injury  Outcome: Ongoing     Problem: Nutrition  Goal: Optimal nutrition therapy  Outcome: Ongoing  Goal: Understanding of nutritional guidelines  Outcome: Ongoing     Problem: Infection:  Goal: Will remain free from infection  Description: Will remain free from infection  Outcome: Ongoing     Problem: Safety:  Goal: Free from accidental physical injury  Description: Free from accidental physical injury  Outcome: Ongoing  Goal: Free from intentional harm  Description: Free from intentional harm  Outcome: Ongoing     Problem: Daily Care:  Goal: Daily care needs are met  Description: Daily care needs are met  Outcome: Ongoing     Problem: Skin Integrity:  Goal: Skin integrity will stabilize  Description: Skin integrity will stabilize  Outcome: Ongoing     Problem: Discharge Planning:  Goal: Patients continuum of care needs are met  Description: Patients continuum of care needs are met  Outcome: Ongoing     Problem: Discharge Planning:  Goal: Discharged to appropriate level of care  Description: Discharged to appropriate level of care  Outcome: Ongoing     Problem: Fluid Volume - Deficit:  Goal: Absence of fluid volume deficit signs and symptoms  Description: Absence of fluid volume deficit signs and symptoms  Outcome: Ongoing     Problem: Nutrition Deficit:  Goal: Ability to achieve adequate nutritional intake will improve  Description:

## 2020-10-31 NOTE — FLOWSHEET NOTE
10/31/20 0830   Vital Signs   Temp 97.1 °F (36.2 °C)   Temp Source Oral   Pulse 70   Heart Rate Source Monitor   Resp 17   BP (!) 142/102   BP Location Right upper arm   Level of Consciousness 0   MEWS Score 1   Patient Currently in Pain No   Pain Assessment   Pain Assessment 0-10   Pain Level 1   Oxygen Therapy   SpO2 99 %   O2 Device None (Room air)     Shift assessment complete- see flowsheets. Pt is A&Ox4. VSS  Respirations are easy, even and unlabored. PIV WDL. Flushed at this time. Plan of care and goals reviewed. Call light within reach. Bed in lowest position with wheels locked. No needs expressed at this time. Will continue to monitor.

## 2020-10-31 NOTE — PROGRESS NOTES
PROGRESS NOTE  S:45 yrs Patient  admitted on 10/28/2020 with Acute pancreatitis with infected necrosis [K85.92] . States planning to go home today    Current Hospital Schedued Meds   pantoprazole  40 mg Intravenous Daily    sodium chloride flush  10 mL Intravenous 2 times per day    enoxaparin  40 mg Subcutaneous Daily    piperacillin-tazobactam  3.375 g Intravenous Q8H    nicotine  1 patch Transdermal Daily     Current Hospital IV Meds    Current Hospital PRN Meds  sodium chloride flush, acetaminophen, promethazine **OR** ondansetron, potassium chloride **OR** potassium alternative oral replacement **OR** potassium chloride, magnesium sulfate, hydrALAZINE    Exam:   Vitals:    10/31/20 0830   BP: (!) 142/102   Pulse: 70   Resp: 17   Temp: 97.1 °F (36.2 °C)   SpO2: 99%     I/O last 3 completed shifts: In: 1900 [I.V.:1900]  Out: -    General appearance: alert, appears stated age and cooperative  HEENT: PERRLA  Neck: no adenopathy, no carotid bruit, no JVD, supple, symmetrical, trachea midline and thyroid not enlarged, symmetric, no tenderness/mass/nodules  Lungs: clear to auscultation bilaterally  Heart: regular rate and rhythm, S1, S2 normal, no murmur, click, rub or gallop  Abdomen: soft, non-tender; bowel sounds normal; no masses,  no organomegaly  Extremities: extremities normal, atraumatic, no cyanosis or edema     Labs:  CBC:   Recent Labs     10/29/20  0503   WBC 8.5   HGB 14.2   HCT 42.7   MCV 96.5        BMP:   Recent Labs     10/29/20  0503 10/30/20  0510   * 132*   K 3.9 3.8    97*   CO2 23 25   BUN 10 8   CREATININE 0.6* 0.6*     LIVER PROFILE:   Recent Labs     10/30/20  0510   AST 22   ALT 26   PROT 5.9*   BILITOT 0.8   ALKPHOS 88     PT/INR: No results for input(s): INR in the last 72 hours. Invalid input(s): PT    IMAGING:  No results found.      Hospital Problems           Last Modified POA    ETOH abuse 10/28/2020 Yes    Acute pancreatitis with infected necrosis 10/28/2020 Yes         Impression:  40 yo male with EtOH pancreatitis    Recommendation:  1. OK with discharge per primary team.  If discharged will plan EUS Thursday. If stays in house will attempt on Monday.       Tri Schulz DO  12:01 PM 10/31/2020            [facility]    [address]      [address] [facility] [place]     Phone: [phone]     Fax: [phone]

## 2020-10-31 NOTE — PLAN OF CARE
care needs are met  Description: Daily care needs are met  10/31/2020 0835 by Reji Barbour RN  Outcome: Ongoing  10/31/2020 0323 by Monster nEnis RN  Outcome: Ongoing     Problem: Skin Integrity:  Goal: Skin integrity will stabilize  Description: Skin integrity will stabilize  10/31/2020 0323 by Monster Ennis RN  Outcome: Ongoing     Problem: Discharge Planning:  Goal: Patients continuum of care needs are met  Description: Patients continuum of care needs are met  10/31/2020 0323 by Monster Ennis RN  Outcome: Ongoing     Problem: Discharge Planning:  Goal: Discharged to appropriate level of care  Description: Discharged to appropriate level of care  10/31/2020 0323 by Monster Ennis RN  Outcome: Ongoing     Problem: Fluid Volume - Deficit:  Goal: Absence of fluid volume deficit signs and symptoms  Description: Absence of fluid volume deficit signs and symptoms  10/31/2020 0323 by Monster Ennis RN  Outcome: Ongoing     Problem: Nutrition Deficit:  Goal: Ability to achieve adequate nutritional intake will improve  Description: Ability to achieve adequate nutritional intake will improve  10/31/2020 0323 by Monster Ennis RN  Outcome: Ongoing     Problem: Sleep Pattern Disturbance:  Goal: Appears well-rested  Description: Appears well-rested  10/31/2020 0323 by Monster Ennis RN  Outcome: Ongoing     Problem: Violence - Risk of, Self/Other-Directed:  Goal: Knowledge of developmental care interventions  Description: Absence of violence  10/31/2020 0323 by Monster Ennis RN  Outcome: Ongoing

## 2020-11-01 LAB
BLOOD CULTURE, ROUTINE: NORMAL
CULTURE, BLOOD 2: NORMAL
SARS-COV-2, PCR: NOT DETECTED

## 2020-11-01 NOTE — DISCHARGE SUMMARY
showed a large pancreatic carcinoma with associated duodenal invasion measured at 5.5 cm--> concerning given increase in size  - Given bx results from prior procedure, started Zosyn D#3 w/ elevated WBC, lipase 611  -GI consulted to Dr. Recio Agent, pt made NPO, started on IVF and PRN pain meds  - underwent EUS with plexus block on 10/22/2020 with Dr. Recio Agent  abd pain worse with clears - made NPO again  - pain improved and pt started on clears, tolerated diet  - discharged home with Augmentin     Leukocytosis - Resolved  - See above, suspect secondary to possible necrosis  - Continued IV antibiotics  - Checked blood culture: negative     EtOH abuse  -Drinks approximately 12 pack of beer per day  -Last drink was 7 AM on 10/28, KIM negative   - developed acute withdrawal symptoms with tremors and tongue fasciculations  - CIWA with PRN Ativan, started Librium  -Fall and seizure precautions  - Doing well, w/d symptoms resolved     Elevated LFTs - Resolved  - suspect 2/2 above   - Trended      Psych   -Continue home medications when able to tolerate p.o.  -Chronically takes benzodiazepines, continue with IV Ativan as above  - cont'd Seroquel and Valium, stopped Klonopin and Lexapro at d/c     Tobacco Abuse   - Recommend cessation   - PRN Nicotine patch/gum      HTN  - BP uncontrolled > improved  - No prior history of hypertension, suspect component of elevation secondary to pain and alcohol withdrawal   - PRN hydralazine    Procedures (Please Review Full Report for Details)  None    Consults    Gastroenterology    Physical Exam at Discharge:    BP (!) 142/102   Pulse 70   Temp 97.1 °F (36.2 °C) (Oral)   Resp 17   Ht 6' (1.829 m)   Wt 148 lb 3.2 oz (67.2 kg)   SpO2 99%   BMI 20.10 kg/m²   Gen: Thin, ill-appearing male. Alert. Eyes: No conjunctival injection. ENT: No discharge. Pharynx clear. Neck: Trachea midline. Resp: No accessory muscle use. No crackles.  no Wheezes. No rhonchi. CV: Regular rate. Regular rhythm.  No murmur.  No rub. No edema. GI:no  epigastric tenderness    non-distended. Normal bowel sounds. Skin: Warm and dry.  Superficial abrasion to the volar aspect of the left thumb, mild surrounding erythema  M/S: No cyanosis. No joint deformity. No clubbing. Neuro: Awake. Grossly nonfocal, bilateral upper extremity tremors, mild tongue fasciculations  Psych: Oriented x 3. No anxiety or agitation. BMP:   Recent Labs     10/30/20  0510   *   K 3.8   CL 97*   CO2 25   BUN 8   CREATININE 0.6*     LIVER PROFILE:   Recent Labs     10/30/20  0510   AST 22   ALT 26   BILITOT 0.8   ALKPHOS 88     CULTURES    Blood cx x2: NGTD     RADIOLOGY    CT ABDOMEN PELVIS WO CONTRAST Additional Contrast? None 10/28/2020   Final Result   1. Large pancreatic carcinoma with associated duodenal invasion. 2. Nonspecific borderline enlarged right upper quadrant omental lymph nodes   and lingular pulmonary nodule concerning for metastatic involvement. PET   scan recommended for further evaluation. Discharge Medications     Medication List      START taking these medications    amoxicillin-clavulanate 500-125 MG per tablet  Commonly known as:   Augmentin  Take 1 tablet by mouth 3 times daily for 5 days        CHANGE how you take these medications    QUEtiapine 50 MG tablet  Commonly known as:  SEROquel  Take 1 tablet by mouth 3 times daily for 14 days  What changed:  how much to take        CONTINUE taking these medications    diazePAM 10 MG tablet  Commonly known as:  VALIUM     omeprazole 20 MG delayed release capsule  Commonly known as:  PRILOSEC        STOP taking these medications    clonazePAM 2 MG tablet  Commonly known as:  KLONOPIN     Lexapro 10 MG tablet  Generic drug:  escitalopram           Where to Get Your Medications      These medications were sent to [facility] [address] [place] [address] Phone:  [phone]   · amoxicillin-clavulanate 500-125 MG per tablet           Discharged in stable condition to home. Follow Up: Follow up with PCP in 1 week. JOANNA Medrano.

## 2020-11-03 ENCOUNTER — ANESTHESIA EVENT (OUTPATIENT)
Dept: ENDOSCOPY | Age: 45
End: 2020-11-03
Payer: MEDICARE

## 2020-11-04 ASSESSMENT — LIFESTYLE VARIABLES: SMOKING_STATUS: 1

## 2020-11-04 NOTE — PROGRESS NOTES
PAT call for Endoscopy procedure scheduled for 11/5/2020. Spoke with pt regarding arrival time of 0730, NPO after midnight. Pt instructed to call physician if any questions or concerns prior to procedure, phone number provided. Pt verbalized understanding, no further questions at present time.

## 2020-11-04 NOTE — ANESTHESIA PRE PROCEDURE
Department of Anesthesiology  Preprocedure Note       Name:  Kym Hicks   Age:  39 y.o.  :  1975                                          MRN:  [id number]         Date:  2020      Surgeon: Destini Jones):  Sudheer Carreon DO    Procedure: Procedure(s):  UPPER EUS W/ANES. W/FNA (8:30) **FIBROSCAN**    Medications prior to admission:   Prior to Admission medications    Medication Sig Start Date End Date Taking? Authorizing Provider   amoxicillin-clavulanate (AUGMENTIN) 500-125 MG per tablet Take 1 tablet by mouth 3 times daily for 5 days 10/31/20 11/5/20  Cher Posey MD   diazePAM (VALIUM) 10 MG tablet Take 10 mg by mouth every 8 hours as needed for Anxiety. Historical Provider, MD   QUEtiapine (SEROQUEL) 50 MG tablet Take 1 tablet by mouth 3 times daily for 14 days  Patient taking differently: Take 200 mg by mouth 3 times daily  12/25/19 10/28/20  Allison Dural, APRN - CNP   omeprazole (PRILOSEC) 20 MG delayed release capsule Take 40 mg by mouth Daily     Historical Provider, MD       Current medications:    No current facility-administered medications for this encounter. Current Outpatient Medications   Medication Sig Dispense Refill    amoxicillin-clavulanate (AUGMENTIN) 500-125 MG per tablet Take 1 tablet by mouth 3 times daily for 5 days 15 tablet 0    diazePAM (VALIUM) 10 MG tablet Take 10 mg by mouth every 8 hours as needed for Anxiety.       QUEtiapine (SEROQUEL) 50 MG tablet Take 1 tablet by mouth 3 times daily for 14 days (Patient taking differently: Take 200 mg by mouth 3 times daily ) 42 tablet 0    omeprazole (PRILOSEC) 20 MG delayed release capsule Take 40 mg by mouth Daily          Allergies:  No Known Allergies    Problem List:    Patient Active Problem List   Diagnosis Code    Anxiety F41.9    ETOH abuse F10.10    Tobacco abuse Z72.0    Insomnia G47.00    Panic disorder F41.0    Low back pain M54.5    Bipolar disorder in full remission ([facility] F31.70    Gastroesophageal reflux disease without esophagitis K21.9    Pruritic dermatitis L29.9    Mood disorder ([facility]) F39    Suicidal ideation R45.851    Acute pancreatitis without necrosis or infection, unspecified K85.90    Acute pancreatitis with infected necrosis K85.92    Acute on chronic pancreatitis ([facility]) K85.90, K86.1    Pancreatic mass K86.89    Leukocytosis D72.829    Elevated LFTs R79.89    Bipolar 1 disorder ([facility]) F31.9    Hypertension I10       Past Medical History:        Diagnosis Date    Alcoholism ([facility]     Antisocial behavior     Bipolar affective disorder ([facility]     Depression     Hyperlipidemia     Hypertension     Insomnia     Low back pain     Panic disorder     Polysubstance abuse ([facility]     Tobacco abuse        Past Surgical History:        Procedure Laterality Date    FOOT SURGERY  Age 17    ORIF Right foot.  WISDOM TOOTH EXTRACTION         Social History:    Social History     Tobacco Use    Smoking status: Current Every Day Smoker     Packs/day: 3.00     Years: 19.00     Pack years: 57.00     Types: Cigarettes    Smokeless tobacco: Former User   Substance Use Topics    Alcohol use: Yes     Comment: 12 beers daily                                 Ready to quit: Not Answered  Counseling given: Not Answered      Vital Signs (Current): There were no vitals filed for this visit.                                            BP Readings from Last 3 Encounters:   10/31/20 (!) 142/102   12/25/19 117/88   01/14/19 136/87       NPO Status:                                                                                 BMI:   Wt Readings from Last 3 Encounters:   10/28/20 148 lb 3.2 oz (67.2 kg)   12/25/19 155 lb (70.3 kg)   01/05/19 160 lb (72.6 kg)     There is no height or weight on file to calculate BMI.    CBC:   Lab Results   Component Value Date    WBC 8.5 10/29/2020    RBC 4.42 10/29/2020    HGB 14.2 10/29/2020    HCT 42.7 10/29/2020    MCV 96.5 10/29/2020    RDW 13.8 10/29/2020     10/29/2020       CMP:   Lab Results   Component Value Date     10/30/2020    K 3.8 10/30/2020    K 3.9 10/29/2020    CL 97 10/30/2020    CO2 25 10/30/2020    BUN 8 10/30/2020    CREATININE 0.6 10/30/2020    GFRAA >60 10/30/2020    GFRAA >60 11/19/2011    AGRATIO 1.7 10/30/2020    LABGLOM >60 10/30/2020    GLUCOSE 83 10/30/2020    PROT 5.9 10/30/2020    PROT 5.4 11/22/2011    CALCIUM 9.4 10/30/2020    BILITOT 0.8 10/30/2020    ALKPHOS 88 10/30/2020    AST 22 10/30/2020    ALT 26 10/30/2020       POC Tests: No results for input(s): POCGLU, POCNA, POCK, POCCL, POCBUN, POCHEMO, POCHCT in the last 72 hours. Coags:   Lab Results   Component Value Date    PROTIME 10.0 11/19/2011    INR 0.92 11/19/2011    APTT 27.2 11/19/2011       HCG (If Applicable): No results found for: PREGTESTUR, PREGSERUM, HCG, HCGQUANT     ABGs: No results found for: PHART, PO2ART, YJQ2BDH, BEF8PLW, BEART, B2YKVIEH     Type & Screen (If Applicable):  No results found for: LABABO, LABRH    Drug/Infectious Status (If Applicable):  No results found for: HIV, HEPCAB    COVID-19 Screening (If Applicable):   Lab Results   Component Value Date    COVID19 Not Detected 10/31/2020         Anesthesia Evaluation  Patient summary reviewed and Nursing notes reviewed no history of anesthetic complications:   Airway: Mallampati: II  TM distance: >3 FB   Neck ROM: full  Mouth opening: > = 3 FB Dental: normal exam         Pulmonary:   (+) current smoker                           Cardiovascular:    (+) hypertension:,                   Neuro/Psych:   (+) psychiatric history (ETOH abuse, Bipolar):            GI/Hepatic/Renal: Neg GI/Hepatic/Renal ROS  (+) GERD: well controlled,      (-) liver disease and no renal disease       Endo/Other: Negative Endo/Other ROS       (-) diabetes mellitus               Abdominal:           Vascular: negative vascular ROS.                                      Anesthesia Plan      TIVA and general     ASA 3 Induction: intravenous. Anesthetic plan and risks discussed with patient. Plan discussed with CRNA. All questions answered and agrees with plan.         Suzan Pugh MD   11/4/2020

## 2020-11-05 ENCOUNTER — ANESTHESIA (OUTPATIENT)
Dept: ENDOSCOPY | Age: 45
End: 2020-11-05
Payer: MEDICARE

## 2020-11-05 ENCOUNTER — HOSPITAL ENCOUNTER (OUTPATIENT)
Age: 45
Setting detail: OUTPATIENT SURGERY
Discharge: HOME | End: 2020-11-05
Attending: INTERNAL MEDICINE | Admitting: INTERNAL MEDICINE
Payer: MEDICARE

## 2020-11-05 VITALS
DIASTOLIC BLOOD PRESSURE: 107 MMHG | BODY MASS INDEX: 20.05 KG/M2 | TEMPERATURE: 97.3 F | SYSTOLIC BLOOD PRESSURE: 160 MMHG | OXYGEN SATURATION: 100 % | WEIGHT: 148 LBS | HEART RATE: 88 BPM | HEIGHT: 72 IN | RESPIRATION RATE: 18 BRPM

## 2020-11-05 VITALS
OXYGEN SATURATION: 99 % | SYSTOLIC BLOOD PRESSURE: 137 MMHG | DIASTOLIC BLOOD PRESSURE: 96 MMHG | RESPIRATION RATE: 23 BRPM

## 2020-11-05 PROCEDURE — 2580000003 HC RX 258: Performed by: NURSE ANESTHETIST, CERTIFIED REGISTERED

## 2020-11-05 PROCEDURE — 2720000010 HC SURG SUPPLY STERILE: Performed by: INTERNAL MEDICINE

## 2020-11-05 PROCEDURE — 7100000010 HC PHASE II RECOVERY - FIRST 15 MIN: Performed by: INTERNAL MEDICINE

## 2020-11-05 PROCEDURE — 6360000002 HC RX W HCPCS: Performed by: NURSE ANESTHETIST, CERTIFIED REGISTERED

## 2020-11-05 PROCEDURE — 3609017100 HC EGD: Performed by: INTERNAL MEDICINE

## 2020-11-05 PROCEDURE — 3609018500 HC EGD US SCOPE W/ADJACENT STRUCTURES: Performed by: INTERNAL MEDICINE

## 2020-11-05 PROCEDURE — 7100000011 HC PHASE II RECOVERY - ADDTL 15 MIN: Performed by: INTERNAL MEDICINE

## 2020-11-05 PROCEDURE — 88173 CYTOPATH EVAL FNA REPORT: CPT

## 2020-11-05 PROCEDURE — 88172 CYTP DX EVAL FNA 1ST EA SITE: CPT

## 2020-11-05 PROCEDURE — 3700000001 HC ADD 15 MINUTES (ANESTHESIA): Performed by: INTERNAL MEDICINE

## 2020-11-05 PROCEDURE — 88305 TISSUE EXAM BY PATHOLOGIST: CPT

## 2020-11-05 PROCEDURE — 88177 CYTP FNA EVAL EA ADDL: CPT

## 2020-11-05 PROCEDURE — 3700000000 HC ANESTHESIA ATTENDED CARE: Performed by: INTERNAL MEDICINE

## 2020-11-05 PROCEDURE — 2500000003 HC RX 250 WO HCPCS: Performed by: NURSE ANESTHETIST, CERTIFIED REGISTERED

## 2020-11-05 PROCEDURE — 2709999900 HC NON-CHARGEABLE SUPPLY: Performed by: INTERNAL MEDICINE

## 2020-11-05 PROCEDURE — 91200 LIVER ELASTOGRAPHY: CPT

## 2020-11-05 RX ORDER — LIDOCAINE HYDROCHLORIDE 20 MG/ML
INJECTION, SOLUTION INFILTRATION; PERINEURAL PRN
Status: DISCONTINUED | OUTPATIENT
Start: 2020-11-05 | End: 2020-11-05 | Stop reason: SDUPTHER

## 2020-11-05 RX ORDER — SODIUM CHLORIDE, SODIUM LACTATE, POTASSIUM CHLORIDE, CALCIUM CHLORIDE 600; 310; 30; 20 MG/100ML; MG/100ML; MG/100ML; MG/100ML
INJECTION, SOLUTION INTRAVENOUS CONTINUOUS PRN
Status: DISCONTINUED | OUTPATIENT
Start: 2020-11-05 | End: 2020-11-05 | Stop reason: SDUPTHER

## 2020-11-05 RX ORDER — PROPOFOL 10 MG/ML
INJECTION, EMULSION INTRAVENOUS PRN
Status: DISCONTINUED | OUTPATIENT
Start: 2020-11-05 | End: 2020-11-05 | Stop reason: SDUPTHER

## 2020-11-05 RX ORDER — MEPERIDINE HYDROCHLORIDE 25 MG/ML
12.5 INJECTION INTRAMUSCULAR; INTRAVENOUS; SUBCUTANEOUS EVERY 5 MIN PRN
Status: DISCONTINUED | OUTPATIENT
Start: 2020-11-05 | End: 2020-11-05 | Stop reason: HOSPADM

## 2020-11-05 RX ORDER — OXYCODONE HYDROCHLORIDE AND ACETAMINOPHEN 5; 325 MG/1; MG/1
1 TABLET ORAL PRN
Status: DISCONTINUED | OUTPATIENT
Start: 2020-11-05 | End: 2020-11-05 | Stop reason: HOSPADM

## 2020-11-05 RX ORDER — SODIUM CHLORIDE 0.9 % (FLUSH) 0.9 %
10 SYRINGE (ML) INJECTION PRN
Status: DISCONTINUED | OUTPATIENT
Start: 2020-11-05 | End: 2020-11-05 | Stop reason: HOSPADM

## 2020-11-05 RX ORDER — HYDRALAZINE HYDROCHLORIDE 20 MG/ML
5 INJECTION INTRAMUSCULAR; INTRAVENOUS EVERY 10 MIN PRN
Status: DISCONTINUED | OUTPATIENT
Start: 2020-11-05 | End: 2020-11-05 | Stop reason: HOSPADM

## 2020-11-05 RX ORDER — ONDANSETRON 2 MG/ML
4 INJECTION INTRAMUSCULAR; INTRAVENOUS EVERY 10 MIN PRN
Status: DISCONTINUED | OUTPATIENT
Start: 2020-11-05 | End: 2020-11-05 | Stop reason: HOSPADM

## 2020-11-05 RX ORDER — OXYCODONE HYDROCHLORIDE AND ACETAMINOPHEN 5; 325 MG/1; MG/1
2 TABLET ORAL PRN
Status: DISCONTINUED | OUTPATIENT
Start: 2020-11-05 | End: 2020-11-05 | Stop reason: HOSPADM

## 2020-11-05 RX ORDER — SODIUM CHLORIDE, SODIUM LACTATE, POTASSIUM CHLORIDE, CALCIUM CHLORIDE 600; 310; 30; 20 MG/100ML; MG/100ML; MG/100ML; MG/100ML
INJECTION, SOLUTION INTRAVENOUS CONTINUOUS
Status: DISCONTINUED | OUTPATIENT
Start: 2020-11-05 | End: 2020-11-05 | Stop reason: HOSPADM

## 2020-11-05 RX ORDER — LABETALOL HYDROCHLORIDE 5 MG/ML
5 INJECTION, SOLUTION INTRAVENOUS EVERY 10 MIN PRN
Status: DISCONTINUED | OUTPATIENT
Start: 2020-11-05 | End: 2020-11-05 | Stop reason: HOSPADM

## 2020-11-05 RX ORDER — SODIUM CHLORIDE 0.9 % (FLUSH) 0.9 %
10 SYRINGE (ML) INJECTION EVERY 12 HOURS SCHEDULED
Status: DISCONTINUED | OUTPATIENT
Start: 2020-11-05 | End: 2020-11-05 | Stop reason: HOSPADM

## 2020-11-05 RX ADMIN — PROPOFOL 90 MG: 10 INJECTION, EMULSION INTRAVENOUS at 09:06

## 2020-11-05 RX ADMIN — PROPOFOL 200 MG: 10 INJECTION, EMULSION INTRAVENOUS at 08:46

## 2020-11-05 RX ADMIN — PROPOFOL 100 MG: 10 INJECTION, EMULSION INTRAVENOUS at 08:56

## 2020-11-05 RX ADMIN — LIDOCAINE HYDROCHLORIDE 50 MG: 20 INJECTION, SOLUTION INFILTRATION; PERINEURAL at 08:42

## 2020-11-05 RX ADMIN — SODIUM CHLORIDE, POTASSIUM CHLORIDE, SODIUM LACTATE AND CALCIUM CHLORIDE: 600; 310; 30; 20 INJECTION, SOLUTION INTRAVENOUS at 08:28

## 2020-11-05 RX ADMIN — PROPOFOL 200 MG: 10 INJECTION, EMULSION INTRAVENOUS at 08:43

## 2020-11-05 ASSESSMENT — PULMONARY FUNCTION TESTS
PIF_VALUE: 1
PIF_VALUE: 0
PIF_VALUE: 1
PIF_VALUE: 0
PIF_VALUE: 1
PIF_VALUE: 1
PIF_VALUE: 0
PIF_VALUE: 1
PIF_VALUE: 1
PIF_VALUE: 0
PIF_VALUE: 1
PIF_VALUE: 0
PIF_VALUE: 1
PIF_VALUE: 0
PIF_VALUE: 1

## 2020-11-05 ASSESSMENT — PAIN SCALES - GENERAL
PAINLEVEL_OUTOF10: 0

## 2020-11-05 ASSESSMENT — PAIN - FUNCTIONAL ASSESSMENT: PAIN_FUNCTIONAL_ASSESSMENT: 0-10

## 2020-11-05 ASSESSMENT — PAIN DESCRIPTION - DESCRIPTORS: DESCRIPTORS: SHARP;STABBING

## 2020-11-05 NOTE — OP NOTE
EUS Note    Patient:   Horace Rivera    :    1975    Facility:     [address]  [address]  [address] [address]   [Outpatient]   Referring/PCP: Keira Pike MD    Procedure:   EUS with with biopsy, celiac plexus neurolysis  Date:     2020   Endoscopist:  Nicolasa Gomez     Preoperative Diagnosis:   Abnormal imaging    Postoperative Diagnosis:  Abnormal imaging    Indication:Abnormal imaging    Anesthesia:  General    Estimated blood loss: Minimal    Complications: None    Description of Procedure:  Informed consent was obtained from the patient after explanation of the procedure including indications, description of the procedure,  benefits and possible risks and complications of the procedure, and alternatives. Questions were answered. The patient's history was reviewed and a directed physical examination was performed prior to the procedure. Patient was monitored throughout the procedure with pulse oximetry and periodic assessment of vital signs per anesthesia. Patient was sedated as noted above. With the patient in the left lateral decubitus position, the Olympus echoendoscope was placed in the patient's mouth and under direct visualization passed into the esophagus to the second portion of duodenum. Realtime sonographic images were obtained using a stationary approach. Patient tolerated the procedure well and take to recovery without complications. Findings[de-identified]   An upper endoscope was used for evaluation. No infiltrating mass was seen in the duodenal bulb or second portion. The stomach appeared normal.  The esophagus appeared normal.  A linear echoendoscope was used for evaluation. The pancreatic parenchyma appeared heterechoic with lobularity and hyperechoic pancreatic duct walls. The common bile duct was traced to the ampulla and appeared nondilated measuring 5 mm in the pancreatic head.   The pancreatic duct was not identified in the head and measured 2.3 mm in the body. The visualized left lobe of the liver, spleen, and kidney appeared normal.  The pancreatic head was hypoechogenic. A 36.8x28.8 mm area was seen in the pancreatic head and interrogated with elastography. There were some areas of hardness which were targeted with a 22 G Acquire needle. 3 passes were performed. The scope was passed deep to the uncinate where a 25.3x39 mm hypoechoic area was seen and sampled with a different 22 G Acquire needle x 3. The celiac axis and portal confluence appeared normal.  There was no vessel invasion of the portal confluence. There was no lymphadenopathy noted. A celiac plexus neurolysis was performed. 10 ml of 0.25 % bupivicaine and 20 ml of dehydrated alcohol were injected in the celiac plexus.      Summary:  Lobularity of the pancreas and hyperechoic pancreatic duct walls supporting ongoing pancreatitis  Hypoechogenicity in the head of the pancreas and uncinate s/p fine needle biopsy with some firmness on elastography  Celiac plexus neurolysis    Recommendations:   Await pathology results  Alcohol cessation    Dev DO Nael    [address]     Phone: [phone]     Fax: [phone]

## 2020-11-05 NOTE — H&P
Gastroenterology Preop Assessment    Patient:   Jim Haines   :    1975   Facility:   [facility]  Referring/PCP: Amadeo Mitchell MD  Date:     2020    Subjective:   Procedure: eus with biopsy, celiac plexus neurolysis    HPI/Reason for procedure:  38 yo male with EtOH abuse/pancreatitis and abnormal imaging, with pain and concerns for malignancy. Previous EUS negative, planning elastography    Past Medical History:   Diagnosis Date    [facility]ism ([facility]     Antisocial behavior     Bipolar affective disorder ([facility]     Depression     Hyperlipidemia     Hypertension     Insomnia     Low back pain     Panic disorder     Polysubstance abuse ([facility]     Tobacco abuse      Past Surgical History:   Procedure Laterality Date    FOOT SURGERY  Age 17    ORIF Right foot.  WISDOM TOOTH EXTRACTION         Social:   Social History     Tobacco Use    Smoking status: Current Every Day Smoker     Packs/day: 2.00     Years: 19.00     Pack years: 38.00     Types: Cigarettes    Smokeless tobacco: Former User   Substance Use Topics    Alcohol use:  Yes     Alcohol/week: 15.0 standard drinks     Types: 15 Cans of beer per week     Comment: Has cut back since hospitalization 10/27/2020     Family:   Family History   Problem Relation Age of Onset    High Blood Pressure Mother     Depression Mother         panic    Anxiety Disorder Mother     High Blood Pressure Father     Heart Disease Maternal Grandmother         MI    High Blood Pressure Maternal Grandmother     Cancer Maternal Grandmother     Depression Maternal Grandmother         panic    Anxiety Disorder Maternal Grandmother     High Blood Pressure Maternal Grandfather     Heart Disease Paternal Grandmother         MI    Stroke Paternal Grandmother     High Blood Pressure Paternal Grandmother     Alcohol Abuse Paternal Grandmother     Heart Disease Paternal Grandfather         MI    High Blood Pressure Paternal Grandfather  Alcohol Abuse Paternal Grandfather     Substance Abuse Paternal Grandfather     Alcohol Abuse Paternal Aunt     Alcohol Abuse Paternal Uncle        Scheduled Medications:    sodium chloride flush  10 mL Intravenous 2 times per day       Current Medications:    Prior to Admission medications    Medication Sig Start Date End Date Taking? Authorizing Provider   amoxicillin-clavulanate (AUGMENTIN) 500-125 MG per tablet Take 1 tablet by mouth 3 times daily for 5 days 10/31/20 11/5/20 Yes Lucia White MD   diazePAM (VALIUM) 10 MG tablet Take 10 mg by mouth every 8 hours as needed for Anxiety.    Yes Historical Provider, MD   QUEtiapine (SEROQUEL) 50 MG tablet Take 1 tablet by mouth 3 times daily for 14 days  Patient taking differently: Take 200 mg by mouth 3 times daily  12/25/19 11/5/20 Yes MOY Lemus CNP   omeprazole (PRILOSEC) 20 MG delayed release capsule Take 40 mg by mouth Daily    Yes Historical Provider, MD         Current Facility-Administered Medications:     lactated ringers infusion, , Intravenous, Continuous, Irais Mcgill MD    sodium chloride flush 0.9 % injection 10 mL, 10 mL, Intravenous, 2 times per day, Irais Mcgill MD    sodium chloride flush 0.9 % injection 10 mL, 10 mL, Intravenous, PRN, Irais Mcgill MD    HYDROmorphone (DILAUDID) injection 0.25 mg, 0.25 mg, Intravenous, Q5 Min PRN, Sofia Leon MD    HYDROmorphone (DILAUDID) injection 0.5 mg, 0.5 mg, Intravenous, Q5 Min PRN, Sofia Leon MD    HYDROmorphone (DILAUDID) injection 0.25 mg, 0.25 mg, Intravenous, Q5 Min PRN, Sofia Leon MD    HYDROmorphone (DILAUDID) injection 0.5 mg, 0.5 mg, Intravenous, Q5 Min PRN, Sofia Leon MD    oxyCODONE-acetaminophen (PERCOCET) 5-325 MG per tablet 1 tablet, 1 tablet, Oral, PRN **OR** oxyCODONE-acetaminophen (PERCOCET) 5-325 MG per tablet 2 tablet, 2 tablet, Oral, PRN, MD Manuelito He Jo Ann Ocasio  11/5/2020

## 2020-11-05 NOTE — PROGRESS NOTES
Patient and brother given discharge instructions verbally and written. Verbalized understanding back. IV removed from his rigth AC tolerated well.

## 2020-11-06 NOTE — PROGRESS NOTES
Physician Progress Note      PATIENT:               Martinez Borjas  CSN #:                  [id number]  :                       1975  ADMIT DATE:       10/28/2020 8:30 AM  [address] DATE:        10/31/2020 2:20 PM  RESPONDING  PROVIDER #:        Aaron Durham MD          QUERY TEXT:    Pt admitted with acute pancreatitis and has [address] [place] abuse with acute withdrawal   symptoms with tremors and tongue fasciculations. Please document any   correlating medical diagnosis in the medical record: The medical record reflects the following:  Risk Factors: [address] [place] abuse  Clinical Indicators: d/c - Drinks approximately 12 pack of beer per day. acute   withdrawal symptoms with tremors and tongue fasciculations. Treatment: CIWA with PRN Ativan, Librium, Fall and seizure precautions    Thank You Dora Sullivan RN, [email or web address]  Options provided:  -- Alcohol dependence with withdrawal  -- Other - I will add my own diagnosis  -- Disagree - Not applicable / Not valid  -- Disagree - Clinically unable to determine / Unknown  -- Refer to Clinical Documentation Reviewer    PROVIDER RESPONSE TEXT:    This patient is alcohol dependent with withdrawal.    Query created by: Jesus Rodriguez on 2020 1:22 PM      Electronically signed by:  Aaron Durham MD 2020 1:47 PM

## 2021-04-20 ENCOUNTER — HOSPITAL ENCOUNTER (INPATIENT)
Age: 46
LOS: 16 days | Discharge: HOME | DRG: 438 | End: 2021-05-07
Attending: STUDENT IN AN ORGANIZED HEALTH CARE EDUCATION/TRAINING PROGRAM | Admitting: INTERNAL MEDICINE
Payer: MEDICARE

## 2021-04-20 ENCOUNTER — APPOINTMENT (OUTPATIENT)
Dept: CT IMAGING | Age: 46
DRG: 438 | End: 2021-04-20
Payer: MEDICARE

## 2021-04-20 DIAGNOSIS — K85.22 ALCOHOL-INDUCED ACUTE PANCREATITIS WITH INFECTED NECROSIS: Primary | ICD-10-CM

## 2021-04-20 LAB
A/G RATIO: 1.8 (ref 1.1–2.2)
ALBUMIN SERPL-MCNC: 4.2 G/DL (ref 3.4–5)
ALP BLD-CCNC: 116 U/L (ref 40–129)
ALT SERPL-CCNC: 40 U/L (ref 10–40)
ANION GAP SERPL CALCULATED.3IONS-SCNC: 19 MMOL/L (ref 3–16)
AST SERPL-CCNC: 84 U/L (ref 15–37)
BILIRUB SERPL-MCNC: 0.8 MG/DL (ref 0–1)
BUN BLDV-MCNC: 10 MG/DL (ref 7–20)
CALCIUM SERPL-MCNC: 9.3 MG/DL (ref 8.3–10.6)
CHLORIDE BLD-SCNC: 96 MMOL/L (ref 99–110)
CO2: 17 MMOL/L (ref 21–32)
CREAT SERPL-MCNC: 0.8 MG/DL (ref 0.9–1.3)
GFR AFRICAN AMERICAN: >60
GFR NON-AFRICAN AMERICAN: >60
GLOBULIN: 2.4 G/DL
GLUCOSE BLD-MCNC: 167 MG/DL (ref 70–99)
LIPASE: 1173 U/L (ref 13–60)
POTASSIUM SERPL-SCNC: 4.6 MMOL/L (ref 3.5–5.1)
SODIUM BLD-SCNC: 132 MMOL/L (ref 136–145)
TOTAL PROTEIN: 6.6 G/DL (ref 6.4–8.2)

## 2021-04-20 PROCEDURE — 2580000003 HC RX 258: Performed by: STUDENT IN AN ORGANIZED HEALTH CARE EDUCATION/TRAINING PROGRAM

## 2021-04-20 PROCEDURE — 99284 EMERGENCY DEPT VISIT MOD MDM: CPT

## 2021-04-20 PROCEDURE — 82077 ASSAY SPEC XCP UR&BREATH IA: CPT

## 2021-04-20 PROCEDURE — 74177 CT ABD & PELVIS W/CONTRAST: CPT

## 2021-04-20 PROCEDURE — 96376 TX/PRO/DX INJ SAME DRUG ADON: CPT

## 2021-04-20 PROCEDURE — 96374 THER/PROPH/DIAG INJ IV PUSH: CPT

## 2021-04-20 PROCEDURE — 83690 ASSAY OF LIPASE: CPT

## 2021-04-20 PROCEDURE — 6360000004 HC RX CONTRAST MEDICATION: Performed by: STUDENT IN AN ORGANIZED HEALTH CARE EDUCATION/TRAINING PROGRAM

## 2021-04-20 PROCEDURE — 96375 TX/PRO/DX INJ NEW DRUG ADDON: CPT

## 2021-04-20 PROCEDURE — 80053 COMPREHEN METABOLIC PANEL: CPT

## 2021-04-20 PROCEDURE — 6360000002 HC RX W HCPCS: Performed by: STUDENT IN AN ORGANIZED HEALTH CARE EDUCATION/TRAINING PROGRAM

## 2021-04-20 PROCEDURE — 85025 COMPLETE CBC W/AUTO DIFF WBC: CPT

## 2021-04-20 RX ORDER — 0.9 % SODIUM CHLORIDE 0.9 %
1000 INTRAVENOUS SOLUTION INTRAVENOUS ONCE
Status: COMPLETED | OUTPATIENT
Start: 2021-04-20 | End: 2021-04-21

## 2021-04-20 RX ORDER — MORPHINE SULFATE 4 MG/ML
4 INJECTION, SOLUTION INTRAMUSCULAR; INTRAVENOUS ONCE
Status: COMPLETED | OUTPATIENT
Start: 2021-04-20 | End: 2021-04-20

## 2021-04-20 RX ADMIN — SODIUM CHLORIDE 1000 ML: 9 INJECTION, SOLUTION INTRAVENOUS at 23:40

## 2021-04-20 RX ADMIN — MORPHINE SULFATE 4 MG: 4 INJECTION INTRAVENOUS at 23:40

## 2021-04-20 RX ADMIN — IOPAMIDOL 75 ML: 755 INJECTION, SOLUTION INTRAVENOUS at 23:56

## 2021-04-20 ASSESSMENT — PAIN DESCRIPTION - LOCATION: LOCATION: ABDOMEN

## 2021-04-21 PROBLEM — K85.90 ACUTE RECURRENT PANCREATITIS: Status: ACTIVE | Noted: 2021-04-21

## 2021-04-21 LAB
BASOPHILS ABSOLUTE: 0.1 K/UL (ref 0–0.2)
BASOPHILS RELATIVE PERCENT: 0.3 %
EOSINOPHILS ABSOLUTE: 0 K/UL (ref 0–0.6)
EOSINOPHILS RELATIVE PERCENT: 0 %
ETHANOL: NORMAL MG/DL (ref 0–0.08)
HCT VFR BLD CALC: 48.7 % (ref 40.5–52.5)
HEMOGLOBIN: 17.1 G/DL (ref 13.5–17.5)
LIPASE: 685 U/L (ref 13–60)
LYMPHOCYTES ABSOLUTE: 0.9 K/UL (ref 1–5.1)
LYMPHOCYTES RELATIVE PERCENT: 3.8 %
MCH RBC QN AUTO: 34.3 PG (ref 26–34)
MCHC RBC AUTO-ENTMCNC: 35.1 G/DL (ref 31–36)
MCV RBC AUTO: 97.5 FL (ref 80–100)
MONOCYTES ABSOLUTE: 1.2 K/UL (ref 0–1.3)
MONOCYTES RELATIVE PERCENT: 5.1 %
NEUTROPHILS ABSOLUTE: 21.7 K/UL (ref 1.7–7.7)
NEUTROPHILS RELATIVE PERCENT: 90.8 %
PDW BLD-RTO: 15.2 % (ref 12.4–15.4)
PLATELET # BLD: 225 K/UL (ref 135–450)
PMV BLD AUTO: 7.9 FL (ref 5–10.5)
RBC # BLD: 5 M/UL (ref 4.2–5.9)
SARS-COV-2, NAAT: NOT DETECTED
WBC # BLD: 23.9 K/UL (ref 4–11)

## 2021-04-21 PROCEDURE — 6360000002 HC RX W HCPCS: Performed by: INTERNAL MEDICINE

## 2021-04-21 PROCEDURE — 6370000000 HC RX 637 (ALT 250 FOR IP): Performed by: INTERNAL MEDICINE

## 2021-04-21 PROCEDURE — 1200000000 HC SEMI PRIVATE

## 2021-04-21 PROCEDURE — 6360000002 HC RX W HCPCS: Performed by: STUDENT IN AN ORGANIZED HEALTH CARE EDUCATION/TRAINING PROGRAM

## 2021-04-21 PROCEDURE — 2580000003 HC RX 258: Performed by: INTERNAL MEDICINE

## 2021-04-21 PROCEDURE — 87635 SARS-COV-2 COVID-19 AMP PRB: CPT

## 2021-04-21 PROCEDURE — 83690 ASSAY OF LIPASE: CPT

## 2021-04-21 PROCEDURE — 36415 COLL VENOUS BLD VENIPUNCTURE: CPT

## 2021-04-21 PROCEDURE — 93005 ELECTROCARDIOGRAM TRACING: CPT | Performed by: INTERNAL MEDICINE

## 2021-04-21 PROCEDURE — 6360000002 HC RX W HCPCS

## 2021-04-21 PROCEDURE — 2580000003 HC RX 258: Performed by: STUDENT IN AN ORGANIZED HEALTH CARE EDUCATION/TRAINING PROGRAM

## 2021-04-21 RX ORDER — SODIUM CHLORIDE 9 MG/ML
INJECTION, SOLUTION INTRAVENOUS
Status: DISPENSED
Start: 2021-04-21 | End: 2021-04-21

## 2021-04-21 RX ORDER — NICOTINE 21 MG/24HR
1 PATCH, TRANSDERMAL 24 HOURS TRANSDERMAL DAILY
Status: DISCONTINUED | OUTPATIENT
Start: 2021-04-21 | End: 2021-05-07 | Stop reason: HOSPADM

## 2021-04-21 RX ORDER — HYDRALAZINE HYDROCHLORIDE 20 MG/ML
10 INJECTION INTRAMUSCULAR; INTRAVENOUS EVERY 6 HOURS PRN
Status: DISCONTINUED | OUTPATIENT
Start: 2021-04-21 | End: 2021-04-22

## 2021-04-21 RX ORDER — QUETIAPINE FUMARATE 200 MG/1
200 TABLET, FILM COATED ORAL 3 TIMES DAILY
Status: DISCONTINUED | OUTPATIENT
Start: 2021-04-21 | End: 2021-05-07 | Stop reason: HOSPADM

## 2021-04-21 RX ORDER — CHLORDIAZEPOXIDE HYDROCHLORIDE 25 MG/1
25 CAPSULE, GELATIN COATED ORAL 4 TIMES DAILY
Status: DISCONTINUED | OUTPATIENT
Start: 2021-04-21 | End: 2021-05-01

## 2021-04-21 RX ORDER — MORPHINE SULFATE 4 MG/ML
4 INJECTION, SOLUTION INTRAMUSCULAR; INTRAVENOUS ONCE
Status: COMPLETED | OUTPATIENT
Start: 2021-04-21 | End: 2021-04-21

## 2021-04-21 RX ORDER — MAGNESIUM SULFATE IN WATER 40 MG/ML
2000 INJECTION, SOLUTION INTRAVENOUS PRN
Status: DISCONTINUED | OUTPATIENT
Start: 2021-04-21 | End: 2021-05-07 | Stop reason: HOSPADM

## 2021-04-21 RX ORDER — ONDANSETRON 2 MG/ML
4 INJECTION INTRAMUSCULAR; INTRAVENOUS ONCE
Status: COMPLETED | OUTPATIENT
Start: 2021-04-21 | End: 2021-04-21

## 2021-04-21 RX ORDER — ACETAMINOPHEN 325 MG/1
650 TABLET ORAL EVERY 4 HOURS PRN
Status: DISCONTINUED | OUTPATIENT
Start: 2021-04-21 | End: 2021-05-07 | Stop reason: HOSPADM

## 2021-04-21 RX ORDER — CLONIDINE HYDROCHLORIDE 0.1 MG/1
0.1 TABLET ORAL EVERY 4 HOURS PRN
Status: DISCONTINUED | OUTPATIENT
Start: 2021-04-21 | End: 2021-04-22

## 2021-04-21 RX ORDER — ONDANSETRON 2 MG/ML
4 INJECTION INTRAMUSCULAR; INTRAVENOUS EVERY 6 HOURS PRN
Status: DISCONTINUED | OUTPATIENT
Start: 2021-04-21 | End: 2021-05-07 | Stop reason: HOSPADM

## 2021-04-21 RX ORDER — CHLORDIAZEPOXIDE HYDROCHLORIDE 10 MG/1
10 CAPSULE, GELATIN COATED ORAL 4 TIMES DAILY
Status: DISCONTINUED | OUTPATIENT
Start: 2021-04-21 | End: 2021-04-21

## 2021-04-21 RX ORDER — POTASSIUM CHLORIDE 20 MEQ/1
40 TABLET, EXTENDED RELEASE ORAL PRN
Status: DISCONTINUED | OUTPATIENT
Start: 2021-04-21 | End: 2021-05-07 | Stop reason: HOSPADM

## 2021-04-21 RX ORDER — SODIUM CHLORIDE 9 MG/ML
1000 INJECTION, SOLUTION INTRAVENOUS CONTINUOUS
Status: DISCONTINUED | OUTPATIENT
Start: 2021-04-21 | End: 2021-04-21 | Stop reason: ALTCHOICE

## 2021-04-21 RX ORDER — SODIUM CHLORIDE 0.9 % (FLUSH) 0.9 %
SYRINGE (ML) INJECTION
Status: DISPENSED
Start: 2021-04-21 | End: 2021-04-22

## 2021-04-21 RX ORDER — PROMETHAZINE HYDROCHLORIDE 25 MG/1
12.5 TABLET ORAL EVERY 6 HOURS PRN
Status: DISCONTINUED | OUTPATIENT
Start: 2021-04-21 | End: 2021-05-07 | Stop reason: HOSPADM

## 2021-04-21 RX ORDER — DIAZEPAM 5 MG/1
10 TABLET ORAL EVERY 8 HOURS PRN
Status: CANCELLED | OUTPATIENT
Start: 2021-04-21

## 2021-04-21 RX ORDER — SODIUM CHLORIDE, SODIUM LACTATE, POTASSIUM CHLORIDE, CALCIUM CHLORIDE 600; 310; 30; 20 MG/100ML; MG/100ML; MG/100ML; MG/100ML
INJECTION, SOLUTION INTRAVENOUS CONTINUOUS
Status: DISPENSED | OUTPATIENT
Start: 2021-04-21 | End: 2021-04-21

## 2021-04-21 RX ORDER — SODIUM CHLORIDE, SODIUM LACTATE, POTASSIUM CHLORIDE, CALCIUM CHLORIDE 600; 310; 30; 20 MG/100ML; MG/100ML; MG/100ML; MG/100ML
INJECTION, SOLUTION INTRAVENOUS CONTINUOUS
Status: DISCONTINUED | OUTPATIENT
Start: 2021-04-21 | End: 2021-04-22

## 2021-04-21 RX ORDER — M-VIT,TX,IRON,MINS/CALC/FOLIC 27MG-0.4MG
1 TABLET ORAL DAILY
Status: DISCONTINUED | OUTPATIENT
Start: 2021-04-21 | End: 2021-05-07 | Stop reason: HOSPADM

## 2021-04-21 RX ORDER — LANOLIN ALCOHOL/MO/W.PET/CERES
100 CREAM (GRAM) TOPICAL DAILY
Status: DISCONTINUED | OUTPATIENT
Start: 2021-04-21 | End: 2021-05-07 | Stop reason: HOSPADM

## 2021-04-21 RX ORDER — POTASSIUM CHLORIDE 7.45 MG/ML
10 INJECTION INTRAVENOUS PRN
Status: DISCONTINUED | OUTPATIENT
Start: 2021-04-21 | End: 2021-05-07 | Stop reason: HOSPADM

## 2021-04-21 RX ADMIN — QUETIAPINE FUMARATE 200 MG: 200 TABLET ORAL at 08:56

## 2021-04-21 RX ADMIN — SODIUM CHLORIDE, POTASSIUM CHLORIDE, SODIUM LACTATE AND CALCIUM CHLORIDE: 600; 310; 30; 20 INJECTION, SOLUTION INTRAVENOUS at 14:36

## 2021-04-21 RX ADMIN — SODIUM CHLORIDE, POTASSIUM CHLORIDE, SODIUM LACTATE AND CALCIUM CHLORIDE: 600; 310; 30; 20 INJECTION, SOLUTION INTRAVENOUS at 03:54

## 2021-04-21 RX ADMIN — HYDROMORPHONE HYDROCHLORIDE 0.5 MG: 1 INJECTION, SOLUTION INTRAMUSCULAR; INTRAVENOUS; SUBCUTANEOUS at 08:54

## 2021-04-21 RX ADMIN — ONDANSETRON HYDROCHLORIDE 4 MG: 2 INJECTION, SOLUTION INTRAMUSCULAR; INTRAVENOUS at 16:35

## 2021-04-21 RX ADMIN — HYDROMORPHONE HYDROCHLORIDE 0.5 MG: 1 INJECTION, SOLUTION INTRAMUSCULAR; INTRAVENOUS; SUBCUTANEOUS at 22:59

## 2021-04-21 RX ADMIN — CHLORDIAZEPOXIDE HYDROCHLORIDE 25 MG: 25 CAPSULE ORAL at 13:01

## 2021-04-21 RX ADMIN — CHLORDIAZEPOXIDE HYDROCHLORIDE 25 MG: 25 CAPSULE ORAL at 16:26

## 2021-04-21 RX ADMIN — SODIUM CHLORIDE 1000 ML: 9 INJECTION, SOLUTION INTRAVENOUS at 00:46

## 2021-04-21 RX ADMIN — ONDANSETRON HYDROCHLORIDE 4 MG: 2 INJECTION, SOLUTION INTRAMUSCULAR; INTRAVENOUS at 00:39

## 2021-04-21 RX ADMIN — Medication 100 MG: at 08:56

## 2021-04-21 RX ADMIN — MORPHINE SULFATE 4 MG: 4 INJECTION INTRAVENOUS at 00:39

## 2021-04-21 RX ADMIN — MEROPENEM 1000 MG: 1 INJECTION, POWDER, FOR SOLUTION INTRAVENOUS at 03:57

## 2021-04-21 RX ADMIN — PIPERACILLIN SODIUM AND TAZOBACTAM SODIUM 3375 MG: 3; .375 INJECTION, POWDER, LYOPHILIZED, FOR SOLUTION INTRAVENOUS at 08:57

## 2021-04-21 RX ADMIN — HYDROMORPHONE HYDROCHLORIDE 0.5 MG: 1 INJECTION, SOLUTION INTRAMUSCULAR; INTRAVENOUS; SUBCUTANEOUS at 12:15

## 2021-04-21 RX ADMIN — HYDROMORPHONE HYDROCHLORIDE 0.5 MG: 1 INJECTION, SOLUTION INTRAMUSCULAR; INTRAVENOUS; SUBCUTANEOUS at 16:26

## 2021-04-21 RX ADMIN — HYDROMORPHONE HYDROCHLORIDE 0.5 MG: 1 INJECTION, SOLUTION INTRAMUSCULAR; INTRAVENOUS; SUBCUTANEOUS at 02:01

## 2021-04-21 RX ADMIN — HYDRALAZINE HYDROCHLORIDE 10 MG: 20 INJECTION INTRAMUSCULAR; INTRAVENOUS at 03:55

## 2021-04-21 RX ADMIN — QUETIAPINE FUMARATE 200 MG: 200 TABLET ORAL at 20:00

## 2021-04-21 RX ADMIN — CHLORDIAZEPOXIDE HYDROCHLORIDE 25 MG: 25 CAPSULE ORAL at 03:56

## 2021-04-21 RX ADMIN — CLONIDINE HYDROCHLORIDE 0.1 MG: 0.1 TABLET ORAL at 21:21

## 2021-04-21 RX ADMIN — HYDROMORPHONE HYDROCHLORIDE 0.5 MG: 1 INJECTION, SOLUTION INTRAMUSCULAR; INTRAVENOUS; SUBCUTANEOUS at 20:02

## 2021-04-21 RX ADMIN — CHLORDIAZEPOXIDE HYDROCHLORIDE 25 MG: 25 CAPSULE ORAL at 08:56

## 2021-04-21 RX ADMIN — MULTIPLE VITAMINS W/ MINERALS TAB 1 TABLET: TAB at 08:56

## 2021-04-21 RX ADMIN — PIPERACILLIN SODIUM AND TAZOBACTAM SODIUM 3375 MG: 3; .375 INJECTION, POWDER, LYOPHILIZED, FOR SOLUTION INTRAVENOUS at 16:26

## 2021-04-21 RX ADMIN — CHLORDIAZEPOXIDE HYDROCHLORIDE 25 MG: 25 CAPSULE ORAL at 20:00

## 2021-04-21 RX ADMIN — QUETIAPINE FUMARATE 200 MG: 200 TABLET ORAL at 13:01

## 2021-04-21 RX ADMIN — HYDROMORPHONE HYDROCHLORIDE 0.5 MG: 1 INJECTION, SOLUTION INTRAMUSCULAR; INTRAVENOUS; SUBCUTANEOUS at 05:19

## 2021-04-21 RX ADMIN — SODIUM CHLORIDE, POTASSIUM CHLORIDE, SODIUM LACTATE AND CALCIUM CHLORIDE: 600; 310; 30; 20 INJECTION, SOLUTION INTRAVENOUS at 09:07

## 2021-04-21 ASSESSMENT — PAIN SCALES - GENERAL
PAINLEVEL_OUTOF10: 4
PAINLEVEL_OUTOF10: 0
PAINLEVEL_OUTOF10: 3
PAINLEVEL_OUTOF10: 9
PAINLEVEL_OUTOF10: 9
PAINLEVEL_OUTOF10: 10
PAINLEVEL_OUTOF10: 10
PAINLEVEL_OUTOF10: 9

## 2021-04-21 ASSESSMENT — PAIN DESCRIPTION - PROGRESSION: CLINICAL_PROGRESSION: GRADUALLY IMPROVING

## 2021-04-21 ASSESSMENT — ENCOUNTER SYMPTOMS
RESPIRATORY NEGATIVE: 1
ABDOMINAL PAIN: 1
ALLERGIC/IMMUNOLOGIC NEGATIVE: 1
EYES NEGATIVE: 1

## 2021-04-21 ASSESSMENT — PAIN DESCRIPTION - ORIENTATION
ORIENTATION: MID

## 2021-04-21 ASSESSMENT — PAIN - FUNCTIONAL ASSESSMENT
PAIN_FUNCTIONAL_ASSESSMENT: ACTIVITIES ARE NOT PREVENTED
PAIN_FUNCTIONAL_ASSESSMENT: ACTIVITIES ARE NOT PREVENTED

## 2021-04-21 ASSESSMENT — PAIN DESCRIPTION - ONSET: ONSET: ON-GOING

## 2021-04-21 ASSESSMENT — PAIN DESCRIPTION - DESCRIPTORS
DESCRIPTORS: DISCOMFORT;CRAMPING
DESCRIPTORS: DISCOMFORT;SHARP;STABBING
DESCRIPTORS: DISCOMFORT

## 2021-04-21 ASSESSMENT — PAIN DESCRIPTION - PAIN TYPE
TYPE: ACUTE PAIN
TYPE: ACUTE PAIN

## 2021-04-21 ASSESSMENT — PAIN DESCRIPTION - FREQUENCY: FREQUENCY: CONTINUOUS

## 2021-04-21 ASSESSMENT — PAIN DESCRIPTION - LOCATION
LOCATION: ABDOMEN

## 2021-04-21 NOTE — PROGRESS NOTES
AM assessment complete. Gave am meds due see mar. PRN pain med given for pain level 9/10 located in abd. A/O x 4. Denies any needs. Telehealth doc set up for Dr Jennifer Ward. Bed locked/lowest position. Call light within reach.

## 2021-04-21 NOTE — H&P
QUEtiapine (SEROQUEL) 50 MG tablet Take 1 tablet by mouth 3 times daily for 14 days  Patient taking differently: Take 200 mg by mouth 3 times daily  12/25/19 11/5/20  MOY Clifton CNP   omeprazole (PRILOSEC) 20 MG delayed release capsule Take 40 mg by mouth Daily     Historical Provider, MD       Allergies:  Dilaudid [hydromorphone]    Social History:      TOBACCO:   reports that he has been smoking cigarettes. He has a 38.00 pack-year smoking history. He has quit using smokeless tobacco.  ETOH:   reports current alcohol use of about 15.0 standard drinks of alcohol per week. Family History:        Problem Relation Age of Onset    High Blood Pressure Mother     Depression Mother         panic    Anxiety Disorder Mother     High Blood Pressure Father     Heart Disease Maternal Grandmother         MI    High Blood Pressure Maternal Grandmother     Cancer Maternal Grandmother     Depression Maternal Grandmother         panic    Anxiety Disorder Maternal Grandmother     High Blood Pressure Maternal Grandfather     Heart Disease Paternal Grandmother         MI    Stroke Paternal Grandmother     High Blood Pressure Paternal Grandmother     Alcohol Abuse Paternal Grandmother     Heart Disease Paternal Grandfather         MI    High Blood Pressure Paternal Grandfather     Alcohol Abuse Paternal Grandfather     Substance Abuse Paternal Grandfather     Alcohol Abuse Paternal Aunt     Alcohol Abuse Paternal Uncle        REVIEW OF SYSTEMS:   Constitutional:  Negative for fever,chills or night sweats  ENT:  Negative for rhinorrhea, epistaxis, hoarseness, sore throat.   Respiratory: Negative for SOB or wheezing   Cardiovascular:   Negative for  chest pain, palpitations   Gastrointestinal: Positive for abdominal pain  Genitourinary:  Negative for polyuria, dysuria   Hematologic/Lymphatic:  Negative for  bleeding tendency, easy bruising  Musculoskeletal:  Negative for myalgias and 01/05/2019    WBCUA 3-5 06/05/2018    BACTERIA 1+ 06/05/2018    RBCUA 0-2 06/05/2018    BLOODU Negative 01/05/2019    SPECGRAV <=1.005 01/05/2019    GLUCOSEU Negative 01/05/2019       Radiology:     CT ABDOMEN PELVIS W IV CONTRAST Additional Contrast? None   Final Result   Acute pancreatitis with questionable developing necrosis in the pancreatic   tail. Fluid collection abutting the inferior aspect of the pancreatic head likely a   pseudocyst or abscess. Marked diffuse hepatic steatosis. ASSESSMENT:  Acute pancreatitis with possible necrosis  Abdominal pain secondary to #1  SIRS secondary to acute pancreatitis  Bipolar disorder  Alcohol dependence with complications  Hyponatremia  Leukocytosis    PLAN:  Aggressive IV fluid hydration  Antibiotic therapy with meropenem given questionable necrosis  N.p.o., GI consult  Resume psychiatric meds  Librium as needed, no signs of withdrawal.  Last drink was 2 nights ago    DVT Prophylaxis: Lovenox  Diet: N.p.o.   code Status: Prior    Dispo -admit to [facility] [address]      Thank you for the opportunity to be involved in this patient's care.       (Please note that portions of this note were completed with a voice recognition program. Efforts were made to edit the dictations but occasionally words are mis-transcribed.)

## 2021-04-21 NOTE — CONSULTS
Gastroenterology Consult Note    Patient:   Dyllan Tam   :    1975   Facility:     [address] [facility] [address]   Referring/PCP: Damien Archer MD  Date:     2021  Consultant:   Tony Robison DO    Subjective: This 39 y.o. male was admitted 2021 with a diagnosis of \"Acute recurrent pancreatitis [K85.90]\" and is seen in consultation regarding pancreatitis    40 yo male patient of Dr. Winifred Zhang with acute pancreatitis and alcohol abuse. Had EUS in November demonstrating some features of chronic pancreatitis. Biopsies did not confirm malignancy. Patient has undergone celiac plexus block. He continues to drink alcohol with imbibing heavily 2 days prior to admission. CT scan demonstrated pancreatitis with fluid collection. Past Medical History:   Diagnosis Date    Alcoholism ([address]     Antisocial behavior     Bipolar affective disorder ([facility]     Depression     Hyperlipidemia     Hypertension     Insomnia     Low back pain     Panic disorder     Polysubstance abuse ([facility]     Tobacco abuse      Past Surgical History:   Procedure Laterality Date    FOOT SURGERY  Age 17    ORIF Right foot.  UPPER GASTROINTESTINAL ENDOSCOPY N/A 2020    EGD ESOPHAGOGASTRODUODENOSCOPY performed by Nick Kamara DO at [facility]  2020    EGD ESOPHAGOGASTRODUODENOSCOPY ULTRASOUND performed by Nick Kamara DO at [facility] EXTRACTION         Social:   Social History     Tobacco Use    Smoking status: Current Every Day Smoker     Packs/day: 2.50     Years: 19.00     Pack years: 47.50     Types: Cigarettes    Smokeless tobacco: Former User   Substance Use Topics    Alcohol use:  Yes     Alcohol/week: 12.0 standard drinks     Types: 12 Cans of beer per week     Comment: Daily     Family:   Family History   Problem Relation Age of Onset    High Blood Pressure Mother     Depression Mother         panic    Anxiety Disorder Mother     High Blood Pressure Father     Heart Disease Maternal Grandmother         MI    High Blood Pressure Maternal Grandmother     Cancer Maternal Grandmother     Depression Maternal Grandmother         panic    Anxiety Disorder Maternal Grandmother     High Blood Pressure Maternal Grandfather     Heart Disease Paternal Grandmother         MI    Stroke Paternal Grandmother     High Blood Pressure Paternal Grandmother     Alcohol Abuse Paternal Grandmother     Heart Disease Paternal Grandfather         MI   [employer] High Blood Pressure Paternal Grandfather     Alcohol Abuse Paternal Grandfather     Substance Abuse Paternal Grandfather     Alcohol Abuse Paternal Aunt     Alcohol Abuse Paternal Uncle        Scheduled Medications:    enoxaparin  40 mg Subcutaneous Daily    thiamine  100 mg Oral Daily    multivitamin  1 tablet Oral Daily    QUEtiapine  200 mg Oral TID    meropenem  1,000 mg Intravenous Q8H    nicotine  1 patch Transdermal Daily    chlordiazePOXIDE  25 mg Oral 4x Daily     Infusions:    sodium chloride Stopped (04/21/21 0357)    lactated ringers 250 mL/hr at 04/21/21 0354    Followed by   [employer] lactated ringers       PRN Medications: acetaminophen, promethazine **OR** ondansetron, potassium chloride **OR** potassium alternative oral replacement **OR** potassium chloride, magnesium sulfate, HYDROmorphone **OR** HYDROmorphone, hydrALAZINE  Allergies: Allergies   Allergen Reactions    Dilaudid [Hydromorphone]        ROS:   Review of Systems   Constitutional: Negative. HENT: Negative. Eyes: Negative. Respiratory: Negative. Cardiovascular: Negative. Gastrointestinal: Positive for abdominal pain. Endocrine: Negative. Genitourinary: Negative. Musculoskeletal: Negative. Skin: Negative. Allergic/Immunologic: Negative. Neurological: Negative. Hematological: Negative. Psychiatric/Behavioral: Negative. Objective:     Physical Exam:   Vitals:    04/21/21 0345   BP: (!) 150/90   Pulse:    Resp:    Temp:    SpO2:    Televideo physical performed with nurse assistance and electronic stethoscope  No intake/output data recorded. General appearance: alert, appears stated age and cooperative  HEENT: PERRLA  Neck: no adenopathy, no carotid bruit, no JVD, supple, symmetrical, trachea midline and thyroid not enlarged, symmetric, no tenderness/mass/nodules  Lungs: clear to auscultation bilaterally  Heart: regular rate and rhythm, S1, S2 normal, no murmur, click, rub or gallop  Abdomen: soft, non-tender; bowel sounds normal; no masses,  no organomegaly  Extremities: extremities normal, atraumatic, no cyanosis or edema     Lab and Imaging Review   Labs:  CBC:   Recent Labs     04/20/21  2300   WBC 23.9*   HGB 17.1   HCT 48.7   MCV 97.5        BMP:   Recent Labs     04/20/21  2300   *   K 4.6   CL 96*   CO2 17*   BUN 10   CREATININE 0.8*     LIVER PROFILE:   Recent Labs     04/20/21  2300 04/21/21  0515   AST 84*  --    ALT 40  --    LIPASE 1,173.0* 685.0*   PROT 6.6  --    BILITOT 0.8  --    ALKPHOS 116  --      PT/INR: No results for input(s): INR in the last 72 hours. Invalid input(s): PT    IMAGING:  Ct Abdomen Pelvis W Iv Contrast Additional Contrast? None    Result Date: 4/21/2021  EXAMINATION: CT OF THE ABDOMEN AND PELVIS WITH CONTRAST 4/21/2021 12:02 am TECHNIQUE: CT of the abdomen and pelvis was performed with the administration of intravenous contrast. Multiplanar reformatted images are provided for review. Dose modulation, iterative reconstruction, and/or weight based adjustment of the mA/kV was utilized to reduce the radiation dose to as low as reasonably achievable. COMPARISON: None.  HISTORY: ORDERING SYSTEM PROVIDED HISTORY: epigastric pain r/o pancreatitis TECHNOLOGIST PROVIDED HISTORY: Additional Contrast?->None Reason for exam:->epigastric pain r/o pancreatitis Decision Support Exception->Emergency Medical Condition (MA) Reason for Exam: mid abd pain hx of pancreatitis Acuity: Acute Type of Exam: Initial FINDINGS: Lower Chest: Platelike atelectasis versus linear scars in the right middle lobe. Organs: Marked diffuse hepatic steatosis. Unremarkable spleen, adrenals, and bilateral kidneys. Ill-defined fluid and marked fat stranding around the pancreas. Also, fluid in the bilateral anterior pararenal spaces. Findings compatible with acute pancreatitis. An approximately 4.0 x 3.1 x 3.6 cm fluid collection just inferior to the head of the pancreas, likely a pseudocyst or abscess. Decreased enhancement of pancreatic tail is suspicious for developing necrosis. [facility] GI/Bowel: Normal appendix. No definite cholelithiasis. Bowel loops nonobstructed. Pelvis: Prostate normal in size but containing dystrophic calcifications. Grossly unremarkable urinary bladder. Tiny fat containing inguinal hernias bilaterally. Peritoneum/Retroperitoneum: No free air. No adenopathy. Vascular calcification with no abdominal aortic aneurysm Bones/Soft Tissues: Intact osseous structures     Acute pancreatitis with questionable developing necrosis in the pancreatic tail. Fluid collection abutting the inferior aspect of the pancreatic head likely a pseudocyst or abscess. Marked diffuse hepatic steatosis. Hospital Problems           Last Modified POA    Acute recurrent pancreatitis 4/21/2021 Yes        Assessment:   40 yo male with recurrent alcoholic pancreatitis    Plan:   1. Needs alcohol abstinence  2. Will reevaluate pancreas with repeat imaging in 4 weeks, further intervention pending symptoms and imaging  3.  Hemoconcentrated; aggressive hydration      Reynold Gonzales DO  8:05 AM 4/21/2021            [facility]    [address]      [address] [place] [address]     Phone: [phone]     Fax: [phone]

## 2021-04-21 NOTE — PROGRESS NOTES
Bedside report given and pt care transferred to [facility]. Pt denies needs at this time. Call light within reach.

## 2021-04-21 NOTE — PROGRESS NOTES
Admission questions and assessment complete; see flow sheet. Scheduled medications administered; See MAR. IV infusing without difficulty. Pt c/o abdominal pain 10/10 no medication given at this time, CIWA currently 8, Librium given at this time. Pt denies any needs at this time.  Pt educated on use of call light and to call out with needs, verbalized understanding, bed in low locked position for pt safety

## 2021-04-21 NOTE — DISCHARGE INSTR - COC
Continuity of Care Form    Patient Name: Barry Ivory   :  1975  MRN:  [id number]    Admit date:  2021  Discharge date:  2021    Code Status Order: Full Code   Advance Directives:     Admitting Physician:  Saira Mason MD  PCP: Jyotsna Charles MD    Discharging Nurse: [facility] Unit/Room#: 0206/0206-01  Discharging Unit Phone Number: ***    Emergency Contact:   Extended Emergency Contact Information  Primary Emergency Contact: [address] [place] [address] Phone: [phone]  Relation: Parent    Past Surgical History:  Past Surgical History:   Procedure Laterality Date    FOOT SURGERY  Age 17    ORIF Right foot.  UPPER GASTROINTESTINAL ENDOSCOPY N/A 2020    EGD ESOPHAGOGASTRODUODENOSCOPY performed by Melecio Brown DO at [address] [facility]  2020    EGD ESOPHAGOGASTRODUODENOSCOPY ULTRASOUND performed by Melecio Brown DO at [address] EXTRACTION         Immunization History: There is no immunization history on file for this patient.     Active Problems:  Patient Active Problem List   Diagnosis Code    Anxiety F41.9    ETOH abuse F10.10    Tobacco abuse Z72.0    Insomnia G47.00    Panic disorder F41.0    Low back pain M54.5    Bipolar disorder in full remission ([facility] F31.70    Gastroesophageal reflux disease without esophagitis K21.9    Pruritic dermatitis L29.9    Mood disorder ([facility] F39    Suicidal ideation R45.851    Acute pancreatitis without necrosis or infection, unspecified K85.90    Acute pancreatitis with infected necrosis K85.92    Acute on chronic pancreatitis (HCC) K85.90, K86.1    Pancreatic mass K86.89    Leukocytosis D72.829    Elevated LFTs R79.89    Bipolar 1 disorder (HCC) F31.9    Hypertension I10    Acute recurrent pancreatitis K85.90       Isolation/Infection:   Isolation          No Isolation        Patient Infection Status     Infection Onset Added Last Indicated Last Indicated By Review Planned Expiration Resolved Resolved By    None active    Resolved    COVID-19 Rule Out 04/21/21 04/21/21 04/21/21 COVID-19, Rapid (Ordered)   04/21/21 Rule-Out Test Resulted    COVID-19 Rule Out 10/31/20 10/31/20 10/31/20 COVID-19 (Ordered)   11/01/20 Rule-Out Test Resulted    COVID-19 Rule Out 10/29/20 10/29/20 10/29/20 COVID-19 (Ordered)   10/29/20 Rule-Out Test Resulted    COVID-19 Rule Out 10/17/20 10/17/20 10/17/20 COVID-19 (Ordered)   10/19/20 Rule-Out Test Resulted          Nurse Assessment:  Last Vital Signs: BP (!) 149/76   Pulse 89   Temp 97.4 °F (36.3 °C) (Oral)   Resp 18   Ht 6' (1.829 m)   Wt 160 lb (72.6 kg)   SpO2 96%   BMI 21.70 kg/m²     Last documented pain score (0-10 scale): Pain Level: 9  Last Weight:   Wt Readings from Last 1 Encounters:   04/20/21 160 lb (72.6 kg)     Mental Status:  {IP PT MENTAL STATUS:20030}    IV Access:  { LAURA IV ACCESS:216598105}    Nursing Mobility/ADLs:  Walking   {CHP DME BFFN:479674338}  Transfer  {CHP DME HONZ:543606953}  Bathing  {CHP DME QSSB:972743346}  Dressing  {CHP DME BRAE:803781414}  Toileting  {CHP DME HDZQ:494023705}  Feeding  {CHP DME ZYUT:664121422}  Med Admin  {CHP DME FOGL:926889521}  Med Delivery   { LAURA MED Delivery:433610776}    Wound Care Documentation and Therapy:        Elimination:  Continence:   · Bowel: {YES / YZ:29976}  · Bladder: {YES / OJ:14102}  Urinary Catheter: {Urinary Catheter:621754304}   Colostomy/Ileostomy/Ileal Conduit: {YES / DO:52970}       Date of Last BM: ***    Intake/Output Summary (Last 24 hours) at 4/21/2021 1226  Last data filed at 4/21/2021 0817  Gross per 24 hour   Intake 0 ml   Output --   Net 0 ml     No intake/output data recorded.     Safety Concerns:     [address] [employer] Safety Concerns:797426177}    Impairments/Disabilities:      [address] [employer] Impairments/Disabilities:754922497}    Nutrition Therapy:  Current Nutrition Therapy:   [address] [employer] Diet List:037122189}    Routes of Feeding: {P DME Other Feedings:917840099}  Liquids: {Slp liquid thickness:49663}  Daily Fluid Restriction: {CHP DME Yes amt example:741049698}  Last Modified Barium Swallow with Video (Video Swallowing Test): {Done Not Done VPMX:161043247}    Treatments at the Time of Hospital Discharge:   Respiratory Treatments: ***  Oxygen Therapy:  {Therapy; copd oxygen:03346}  Ventilator:    { CC Vent TLIP:792273547}    Rehab Therapies: {THERAPEUTIC INTERVENTION:0856755807}  Weight Bearing Status/Restrictions: [address] CC Weight Bearin}  Other Medical Equipment (for information only, NOT a DME order):  {EQUIPMENT:691963967}  Other Treatments: ***    Patient's personal belongings (please select all that are sent with patient):  {CHP DME Belongings:729065577}    RN SIGNATURE:  {Esignature:994173032}    CASE MANAGEMENT/SOCIAL WORK SECTION    Inpatient Status Date: 2021    Readmission Risk Assessment Score:  Readmission Risk              Risk of Unplanned Readmission:        10           Discharging to Facility/ Agency   · Name: [address]  · Address:  · Phone: [phone]  · Fax:    Dialysis Facility (if applicable)   · Name:  · Address:  · Dialysis Schedule:  · Phone:  · Fax:    / signature: Electronically signed by Sonido Grier RN on 21 at 9:34 AM EDT    PHYSICIAN SECTION    Prognosis: {Prognosis:8427430019}    Condition at Discharge: [address] Patient Condition:328438552}    Rehab Potential (if transferring to Rehab): {Prognosis:4779747967}    Recommended Labs or Other Treatments After Discharge: ***    Physician Certification: I certify the above information and transfer of Roly Speak  is necessary for the continuing treatment of the diagnosis listed and that he requires Home Care for less 30 days.      Update Admission H&P: Changes in H&P as follows - se attached    PHYSICIAN SIGNATURE: WEN Crowell MD/ Electronically signed by Sonido Grier RN on 21 at 9:34 AM

## 2021-04-21 NOTE — CARE COORDINATION
Case Management Assessment  Initial Evaluation      Patient Name: Tae Jamison  YOB: 1975  Diagnosis: Acute recurrent pancreatitis [K85.90]  Date / Time: 4/20/2021 10:47 PM    Admission status/Date:  04/21/2021  Chart Reviewed: Yes      Patient Interviewed: Yes   Family Interviewed:  No      Hospitalization in the last 30 days:  No      Health Care Decision Maker :     (CM - must 1st enter selection under Navigator - emergency contact- [place] Relationship and pick relationship)   Who do you trust or have selected to make healthcare decisions for you      Met with: patient  Interview conducted  (bedside/phone): bedside    Current PCP: Bharti Cheung MD    [address] required for SNF : NA        3 night stay required - NA    ADLS  Support Systems/Care Needs: None  Transportation: self/father    Meal Preparation: self    Housing  Living Arrangements: IPTA, lives alone Steps: NA  Intent for return to present living arrangements: Yes  Identified Issues: NA    Home Care Information  Active with [address] [address] : No Agency:(Services)  Type of Home Care Services: None  Passport/Waiver : No  :                      Phone Number:    Passport/Waiver Services: NA          Durable Medical Equiptment   DME Provider: SEAN  Equipment: NA  Walker___Cane___RTS___ BSC___Shower Chair___Hospital Bed___W/C____Other________  02 at ____Liter(s)---wears(frequency)_______ HHN ___ CPAP___ BiPap___   N/A____      Home O2 Use :  No      Informed of need for care provider to bring portable home O2 tank on day of discharge for nursing to connect prior to leaving:   Not Indicated  Verbalized agreement/Understanding:   Not Indicated    Community Service Affiliation  Dialysis:  No    · Agency:  · Location:  · Dialysis Schedule:  · Phone:   · Fax:     Other Community Services: (ex:PT/OT,Mental Health,Wound Clinic, [address] [place] [address] Paul Webb)    [address] [place] [address]

## 2021-04-21 NOTE — ED PROVIDER NOTES
Primary Care Physician: Park Wells MD   Attending Physician: Jeremiah Sánchez, *     History   Chief Complaint   Patient presents with    Abdominal Pain     pt states abd pain that started yesterday, hx pancreatitis. last alcohol yesterday        HPI   Tae Jamison is a 39 y.o. male with history of alcohol abuse, bipolar, depression, hypertension, hyperlipidemia, panic disorder, presenting complaining of epigastric pain concerning for pancreatitis. Patient states that he has had pancreatitis 3 times secondary to alcohol. He stated that he drank heavily yesterday and developed pain associated with nausea vomiting concerning for pancreatitis. He denies chest pain shortness of breath, fevers, but admits to epigastric pain which is 10 on 10 in intensity. Past Medical History:   Diagnosis Date    [facility]ism [facility]     Antisocial behavior     Bipolar affective disorder ([facility]     Depression     Hyperlipidemia     Hypertension     Insomnia     Low back pain     Panic disorder     Polysubstance abuse ([facility]     Tobacco abuse         Past Surgical History:   Procedure Laterality Date    FOOT SURGERY  Age 17    ORIF Right foot.     UPPER GASTROINTESTINAL ENDOSCOPY N/A 11/5/2020    EGD ESOPHAGOGASTRODUODENOSCOPY performed by Davin Valencia DO at [place] [address]  11/5/2020    EGD ESOPHAGOGASTRODUODENOSCOPY ULTRASOUND performed by Davin Valencia DO at [facility] 96 EXTRACTION          Family History   Problem Relation Age of Onset    High Blood Pressure Mother     Depression Mother         panic    Anxiety Disorder Mother     High Blood Pressure Father     Heart Disease Maternal Grandmother         MI    High Blood Pressure Maternal Grandmother     Cancer Maternal Grandmother     Depression Maternal Grandmother         panic    Anxiety Disorder Maternal Grandmother     High Blood Pressure Maternal Grandfather     Heart Disease Paternal Grandmother         MI    Stroke Paternal Grandmother     High Blood Pressure Paternal Grandmother     Alcohol Abuse Paternal Grandmother     Heart Disease Paternal Grandfather         [facility] High Blood Pressure Paternal Grandfather     Alcohol Abuse Paternal Grandfather     Substance Abuse Paternal Grandfather     Alcohol Abuse Paternal Aunt     Alcohol Abuse Paternal Uncle         Social History     Socioeconomic History    Marital status: Legally      Spouse name: None    Number of children: 1    Years of education: None    Highest education level: None   Occupational History    Occupation:    Social Needs    Financial resource strain: None    Food insecurity     Worry: None     Inability: None    Transportation needs     Medical: None     Non-medical: None   Tobacco Use    Smoking status: Current Every Day Smoker     Packs/day: 2.50     Years: 19.00     Pack years: 47.50     Types: Cigarettes    Smokeless tobacco: Former User   Substance and Sexual Activity    Alcohol use:  Yes     Alcohol/week: 12.0 standard drinks     Types: 12 Cans of beer per week     Comment: Daily    Drug use: Yes     Types: Marijuana     Comment: Has medical card per patient     Sexual activity: Not Currently     Partners: Female   Lifestyle    Physical activity     Days per week: None     Minutes per session: None    Stress: None   Relationships    Social connections     Talks on phone: None     Gets together: None     Attends Hindu service: None     Active member of club or organization: None     Attends meetings of clubs or organizations: None     Relationship status: None    Intimate partner violence     Fear of current or ex partner: None     Emotionally abused: None     Physically abused: None     Forced sexual activity: None   Other Topics Concern    None   Social History Narrative    None        Review of Systems   10 total systems reviewed and found to be negative unless otherwise noted in HPI     Physical Exam   BP (!) 140/104   Pulse 144   Temp 97.6 °F (36.4 °C) (Oral)   Resp 16   Ht 6' (1.829 m)   Wt 160 lb (72.6 kg)   SpO2 98%   BMI 21.70 kg/m²      CONSTITUTIONAL: ill appearing, in acute distress   HEAD: atraumatic, normocephalic   EYES: PERRL, No injection, discharge or scleral icterus. ENT: Moist mucous membranes. NECK: Normal ROM, NO LAD   CARDIOVASCULAR: Regular rate and rhythm. No murmurs or gallop. PULMONARY/CHEST: Airway patent. No retractions. Breath sounds clear with good air entry bilaterally. ABDOMEN: Soft, Non-distended and non-tender, without guarding or rebound. SKIN: Acyanotic, warm, dry   MUSCULOSKELETAL: No swelling, tenderness or deformity   NEUROLOGICAL: Awake and oriented x 3. Pulses intact. Grossly nonfocal   Nursing note and vitals reviewed.      ED Course & Medical Decision Making   Medications   acetaminophen (TYLENOL) tablet 650 mg (has no administration in time range)   promethazine (PHENERGAN) tablet 12.5 mg ( Oral See Alternative 4/21/21 1635)     Or   ondansetron (ZOFRAN) injection 4 mg (4 mg Intravenous Given 4/21/21 1635)   enoxaparin (LOVENOX) injection 40 mg (40 mg Subcutaneous Not Given 4/21/21 0859)   lactated ringers infusion ( Intravenous New Bag 4/21/21 1436)     Followed by   lactated ringers infusion ( Intravenous Rate/Dose Change 4/21/21 1515)   thiamine tablet 100 mg (100 mg Oral Given 4/21/21 0856)   therapeutic multivitamin-minerals 1 tablet (1 tablet Oral Given 4/21/21 0856)   potassium chloride (KLOR-CON M) extended release tablet 40 mEq (has no administration in time range)     Or   potassium bicarb-citric acid (EFFER-K) effervescent tablet 40 mEq (has no administration in time range)     Or   potassium chloride 10 mEq/100 mL IVPB (Peripheral Line) (has no administration in time range)   magnesium sulfate 2000 mg in 50 mL IVPB premix (has no administration in time range)   HYDROmorphone (DILAUDID) injection 0.25 mg ( Intravenous See Alternative 4/21/21 2002)     Or   HYDROmorphone (DILAUDID) injection 0.5 mg (0.5 mg Intravenous Given 4/21/21 2002)   QUEtiapine (SEROQUEL) tablet 200 mg (200 mg Oral Given 4/21/21 2000)   hydrALAZINE (APRESOLINE) injection 10 mg (10 mg Intravenous Given 4/21/21 0355)   nicotine (NICODERM CQ) 21 MG/24HR 1 patch (1 patch Transdermal Patch Applied 4/21/21 8723)   chlordiazePOXIDE (LIBRIUM) capsule 25 mg (25 mg Oral Given 4/21/21 2000)   piperacillin-tazobactam (ZOSYN) 3,375 mg in sodium chloride 0.9 % 100 mL IVPB extended infusion (mini-bag) (0 mg Intravenous Stopped 4/21/21 2026)   sodium chloride 0.9 % infusion (  Canceled Entry 4/21/21 0911)   sodium chloride flush 0.9 % injection (  Canceled Entry 4/21/21 1704)   cloNIDine (CATAPRES) tablet 0.1 mg (0.1 mg Oral Given 4/21/21 2121)   0.9 % sodium chloride bolus (0 mLs Intravenous Stopped 4/21/21 0041)   morphine sulfate (PF) injection 4 mg (4 mg Intravenous Given 4/20/21 2340)   iopamidol (ISOVUE-370) 76 % injection 75 mL (75 mLs Intravenous Given 4/20/21 2356)   morphine sulfate (PF) injection 4 mg (4 mg Intravenous Given 4/21/21 0039)   ondansetron (ZOFRAN) injection 4 mg (4 mg Intravenous Given 4/21/21 0039)      Labs Reviewed   CBC WITH AUTO DIFFERENTIAL - Abnormal; Notable for the following components:       Result Value    WBC 23.9 (*)     MCH 34.3 (*)     Neutrophils Absolute 21.7 (*)     Lymphocytes Absolute 0.9 (*)     All other components within normal limits    Narrative:     Performed at:  [facility] [address],  [place], [facility]   Phone [phone]   COMPREHENSIVE METABOLIC PANEL - Abnormal; Notable for the following components:    Sodium 132 (*)     Chloride 96 (*)     CO2 17 (*)     Anion Gap 19 (*)     Glucose 167 (*)     CREATININE 0.8 (*)     AST 84 (*)     All other components within normal limits    Narrative:     Performed at:  [facility]  [place] [address],  [place], [facility]   Phone [phone]   LIPASE - Abnormal; Notable for the following components:    Lipase 1,173.0 (*)     All other components within normal limits    Narrative:     Performed at:  [facility] [address],  [place], [facility]   Phone [phone]   LIPASE - Abnormal; Notable for the following components:    Lipase 685.0 (*)     All other components within normal limits    Narrative:     Performed at:  [place] [address],  [place], [facility]   Phone [phone], RAPID    Narrative:     Performed at:  [place] [address],  [place], [facility]   Phone [phone]   ETHANOL    Narrative:     Performed at:  [place] [address],  [place], [facility]   Phone [phone]   URINE RT REFLEX TO CULTURE   URINE DRUG SCREEN   TRIGLYCERIDES   COMPREHENSIVE METABOLIC PANEL W/ REFLEX TO MG FOR LOW K   CBC WITH AUTO DIFFERENTIAL   LIPASE      CT ABDOMEN PELVIS W IV CONTRAST Additional Contrast? None   Final Result   Acute pancreatitis with questionable developing necrosis in the pancreatic   tail. Fluid collection abutting the inferior aspect of the pancreatic head likely a   pseudocyst or abscess. Marked diffuse hepatic steatosis. EKG INTERPRETATION:  EKG by my preliminary interpretation shows notes tach with a rate of 144 normal axis, normal intervals, with no ST changes indicative of ischemia at this time. PROCEDURES:   Procedures    ASSESSMENT AND PLAN:  Terri Soria is a 39 y.o. male resenting complaint of epigastric pain concerning for acute pancreatitis which has had in the past secondary to alcohol abuse. On exam he was in distress, tachycardic, ill looking. Also had significant tenderness to palpation around the epigastrium.   Venous presentation labs were initiated he was given pain management antiemedics. Preliminary result of the labs showed significantly high lipase levels suggestive of pancreatitis. His white count was also elevated. As a result I did obtain a CT scan to evaluate for cyst versus necrosis and results were concerning for pancreatic necrosis. Patient was admitted to the hospitalist service for further evaluation and treatment. I did not think that he needed antibiotics despite elevated white count. The decision was deferred to the primary care service. Nonetheless patient was admitted to the hospitalist service for further evaluation and treatment. ClINICAL IMPRESSION:  1. Alcohol-induced acute pancreatitis with infected necrosis        DISPOSITION Admitted 04/21/2021 01:10:53 AM   -Findings and recommendations explained to patient. He expressed understanding and agreed with the plan.   David Oconnor MD (electronically signed)  4/21/2021  _________________________________________________________________________________________  Amount and/or Complexity of Data Reviewed:  Clinical lab tests: ordered and reviewed   Tests in the radiology section of CPT®: ordered and reviewed   Tests in the medicine section of CPT®: ordered and reviewed   Decide to obtain previous medical records or to obtain history from someone other than the patient: no  Obtain history from someone other than the patient: no  Review and summarize past medical records:yes  I looked up the patient in our electronic medical record:yes  Discuss the patient with other providers:yes  Independent visualization of images, tracings, or specimens:yes  Risk of Complications, Morbidity, and/or Mortality:Moderate  Presenting problems: moderate Diagnostic procedures: moderate yes Management options: moderate     Critical Care Attestation   Total critical care time: 35 minutes minimum   Critical care time does not include separately billable procedures and treating other patients. Critical care was necessary to treat or prevent imminent or life-threatening deterioration of the following conditions: Epigastric abdominal pain work-up suggestive of acute pancreatitis with necrosis. case discussed with consultants.       _________________________________________________________________________________________  This record is transcribed utilizing voice recognition technology. There are inherent limitations in this technology. In addition, there may be limitations in editing of this report. If there are any discrepancies, please contact me directly.         Stephanie Campos MD  04/21/21 8247

## 2021-04-21 NOTE — PROGRESS NOTES
Consult has been called to Dr. Eloina Garrett on 4/21/21. Spoke with lamine.  6:56 AM    Whit Land  4/21/2021

## 2021-04-22 LAB
A/G RATIO: 1.2 (ref 1.1–2.2)
ALBUMIN SERPL-MCNC: 2.9 G/DL (ref 3.4–5)
ALP BLD-CCNC: 75 U/L (ref 40–129)
ALT SERPL-CCNC: 21 U/L (ref 10–40)
ANION GAP SERPL CALCULATED.3IONS-SCNC: 12 MMOL/L (ref 3–16)
AST SERPL-CCNC: 53 U/L (ref 15–37)
ATYPICAL LYMPHOCYTE RELATIVE PERCENT: 2 % (ref 0–6)
BANDED NEUTROPHILS RELATIVE PERCENT: 3 % (ref 0–7)
BASOPHILS ABSOLUTE: 0 K/UL (ref 0–0.2)
BASOPHILS RELATIVE PERCENT: 0 %
BILIRUB SERPL-MCNC: 1.4 MG/DL (ref 0–1)
BUN BLDV-MCNC: 11 MG/DL (ref 7–20)
CALCIUM SERPL-MCNC: 8.9 MG/DL (ref 8.3–10.6)
CHLORIDE BLD-SCNC: 100 MMOL/L (ref 99–110)
CO2: 23 MMOL/L (ref 21–32)
CREAT SERPL-MCNC: 0.8 MG/DL (ref 0.9–1.3)
EKG ATRIAL RATE: 144 BPM
EKG DIAGNOSIS: NORMAL
EKG P AXIS: 73 DEGREES
EKG P-R INTERVAL: 120 MS
EKG Q-T INTERVAL: 274 MS
EKG QRS DURATION: 84 MS
EKG QTC CALCULATION (BAZETT): 424 MS
EKG R AXIS: -44 DEGREES
EKG T AXIS: 59 DEGREES
EKG VENTRICULAR RATE: 144 BPM
EOSINOPHILS ABSOLUTE: 0 K/UL (ref 0–0.6)
EOSINOPHILS RELATIVE PERCENT: 0 %
GFR AFRICAN AMERICAN: >60
GFR NON-AFRICAN AMERICAN: >60
GLOBULIN: 2.5 G/DL
GLUCOSE BLD-MCNC: 139 MG/DL (ref 70–99)
HCT VFR BLD CALC: 48.4 % (ref 40.5–52.5)
HEMOGLOBIN: 16.5 G/DL (ref 13.5–17.5)
LIPASE: 253 U/L (ref 13–60)
LYMPHOCYTES ABSOLUTE: 1.1 K/UL (ref 1–5.1)
LYMPHOCYTES RELATIVE PERCENT: 5 %
MCH RBC QN AUTO: 33.7 PG (ref 26–34)
MCHC RBC AUTO-ENTMCNC: 34.1 G/DL (ref 31–36)
MCV RBC AUTO: 98.9 FL (ref 80–100)
MONOCYTES ABSOLUTE: 0.3 K/UL (ref 0–1.3)
MONOCYTES RELATIVE PERCENT: 2 %
NEUTROPHILS ABSOLUTE: 14.9 K/UL (ref 1.7–7.7)
NEUTROPHILS RELATIVE PERCENT: 88 %
PDW BLD-RTO: 15.9 % (ref 12.4–15.4)
PLATELET # BLD: 118 K/UL (ref 135–450)
PLATELET SLIDE REVIEW: ABNORMAL
PMV BLD AUTO: 9.3 FL (ref 5–10.5)
POIKILOCYTES: ABNORMAL
POTASSIUM REFLEX MAGNESIUM: 3.8 MMOL/L (ref 3.5–5.1)
PROCALCITONIN: 0.35 NG/ML (ref 0–0.15)
RBC # BLD: 4.89 M/UL (ref 4.2–5.9)
SLIDE REVIEW: ABNORMAL
SODIUM BLD-SCNC: 135 MMOL/L (ref 136–145)
TOTAL PROTEIN: 5.4 G/DL (ref 6.4–8.2)
TRIGL SERPL-MCNC: 382 MG/DL (ref 0–150)
WBC # BLD: 16.4 K/UL (ref 4–11)

## 2021-04-22 PROCEDURE — 6360000002 HC RX W HCPCS: Performed by: INTERNAL MEDICINE

## 2021-04-22 PROCEDURE — 1200000000 HC SEMI PRIVATE

## 2021-04-22 PROCEDURE — 93010 ELECTROCARDIOGRAM REPORT: CPT | Performed by: INTERNAL MEDICINE

## 2021-04-22 PROCEDURE — 6360000002 HC RX W HCPCS: Performed by: PHYSICIAN ASSISTANT

## 2021-04-22 PROCEDURE — 84478 ASSAY OF TRIGLYCERIDES: CPT

## 2021-04-22 PROCEDURE — 99232 SBSQ HOSP IP/OBS MODERATE 35: CPT | Performed by: INTERNAL MEDICINE

## 2021-04-22 PROCEDURE — 6370000000 HC RX 637 (ALT 250 FOR IP): Performed by: INTERNAL MEDICINE

## 2021-04-22 PROCEDURE — 84145 PROCALCITONIN (PCT): CPT

## 2021-04-22 PROCEDURE — 2580000003 HC RX 258: Performed by: INTERNAL MEDICINE

## 2021-04-22 PROCEDURE — 6370000000 HC RX 637 (ALT 250 FOR IP)

## 2021-04-22 PROCEDURE — 80053 COMPREHEN METABOLIC PANEL: CPT

## 2021-04-22 PROCEDURE — 2580000003 HC RX 258: Performed by: PHYSICIAN ASSISTANT

## 2021-04-22 PROCEDURE — 85025 COMPLETE CBC W/AUTO DIFF WBC: CPT

## 2021-04-22 PROCEDURE — 36415 COLL VENOUS BLD VENIPUNCTURE: CPT

## 2021-04-22 PROCEDURE — 2580000003 HC RX 258

## 2021-04-22 PROCEDURE — 83690 ASSAY OF LIPASE: CPT

## 2021-04-22 RX ORDER — SODIUM CHLORIDE 0.9 % (FLUSH) 0.9 %
5-40 SYRINGE (ML) INJECTION EVERY 12 HOURS SCHEDULED
Status: DISCONTINUED | OUTPATIENT
Start: 2021-04-22 | End: 2021-05-07 | Stop reason: HOSPADM

## 2021-04-22 RX ORDER — SODIUM CHLORIDE 9 MG/ML
INJECTION, SOLUTION INTRAVENOUS CONTINUOUS
Status: DISCONTINUED | OUTPATIENT
Start: 2021-04-22 | End: 2021-05-02

## 2021-04-22 RX ORDER — LORAZEPAM 2 MG/ML
1 INJECTION INTRAMUSCULAR
Status: DISCONTINUED | OUTPATIENT
Start: 2021-04-22 | End: 2021-05-01

## 2021-04-22 RX ORDER — LORAZEPAM 2 MG/ML
4 INJECTION INTRAMUSCULAR
Status: DISCONTINUED | OUTPATIENT
Start: 2021-04-22 | End: 2021-05-01

## 2021-04-22 RX ORDER — LORAZEPAM 1 MG/1
1 TABLET ORAL
Status: DISCONTINUED | OUTPATIENT
Start: 2021-04-22 | End: 2021-05-01

## 2021-04-22 RX ORDER — LORAZEPAM 2 MG/ML
3 INJECTION INTRAMUSCULAR
Status: DISCONTINUED | OUTPATIENT
Start: 2021-04-22 | End: 2021-05-01

## 2021-04-22 RX ORDER — LABETALOL HYDROCHLORIDE 5 MG/ML
20 INJECTION, SOLUTION INTRAVENOUS EVERY 4 HOURS PRN
Status: DISCONTINUED | OUTPATIENT
Start: 2021-04-22 | End: 2021-05-07 | Stop reason: HOSPADM

## 2021-04-22 RX ORDER — SODIUM CHLORIDE 0.9 % (FLUSH) 0.9 %
5-40 SYRINGE (ML) INJECTION PRN
Status: DISCONTINUED | OUTPATIENT
Start: 2021-04-22 | End: 2021-05-07 | Stop reason: HOSPADM

## 2021-04-22 RX ORDER — SODIUM CHLORIDE 0.9 % (FLUSH) 0.9 %
SYRINGE (ML) INJECTION
Status: COMPLETED
Start: 2021-04-22 | End: 2021-04-22

## 2021-04-22 RX ORDER — LORAZEPAM 2 MG/1
4 TABLET ORAL
Status: DISCONTINUED | OUTPATIENT
Start: 2021-04-22 | End: 2021-05-01

## 2021-04-22 RX ORDER — LORAZEPAM 2 MG/ML
2 INJECTION INTRAMUSCULAR
Status: DISCONTINUED | OUTPATIENT
Start: 2021-04-22 | End: 2021-05-01

## 2021-04-22 RX ORDER — LORAZEPAM 2 MG/1
2 TABLET ORAL
Status: DISCONTINUED | OUTPATIENT
Start: 2021-04-22 | End: 2021-05-01

## 2021-04-22 RX ORDER — DIMETHICONE, OXYBENZONE, AND PADIMATE O 2; 2.5; 6.6 G/100G; G/100G; G/100G
STICK TOPICAL
Status: COMPLETED
Start: 2021-04-22 | End: 2021-04-22

## 2021-04-22 RX ORDER — SODIUM CHLORIDE 9 MG/ML
25 INJECTION, SOLUTION INTRAVENOUS PRN
Status: DISCONTINUED | OUTPATIENT
Start: 2021-04-22 | End: 2021-05-07 | Stop reason: HOSPADM

## 2021-04-22 RX ADMIN — HYDROMORPHONE HYDROCHLORIDE 0.5 MG: 1 INJECTION, SOLUTION INTRAMUSCULAR; INTRAVENOUS; SUBCUTANEOUS at 08:49

## 2021-04-22 RX ADMIN — QUETIAPINE FUMARATE 200 MG: 200 TABLET ORAL at 08:48

## 2021-04-22 RX ADMIN — CHLORDIAZEPOXIDE HYDROCHLORIDE 25 MG: 25 CAPSULE ORAL at 16:36

## 2021-04-22 RX ADMIN — HYDROMORPHONE HYDROCHLORIDE 0.5 MG: 1 INJECTION, SOLUTION INTRAMUSCULAR; INTRAVENOUS; SUBCUTANEOUS at 05:16

## 2021-04-22 RX ADMIN — SODIUM CHLORIDE, PRESERVATIVE FREE: 5 INJECTION INTRAVENOUS at 09:30

## 2021-04-22 RX ADMIN — PIPERACILLIN SODIUM AND TAZOBACTAM SODIUM 3375 MG: 3; .375 INJECTION, POWDER, LYOPHILIZED, FOR SOLUTION INTRAVENOUS at 16:36

## 2021-04-22 RX ADMIN — HYDROMORPHONE HYDROCHLORIDE 0.5 MG: 1 INJECTION, SOLUTION INTRAMUSCULAR; INTRAVENOUS; SUBCUTANEOUS at 21:07

## 2021-04-22 RX ADMIN — PIPERACILLIN SODIUM AND TAZOBACTAM SODIUM 3375 MG: 3; .375 INJECTION, POWDER, LYOPHILIZED, FOR SOLUTION INTRAVENOUS at 02:17

## 2021-04-22 RX ADMIN — CHLORDIAZEPOXIDE HYDROCHLORIDE 25 MG: 25 CAPSULE ORAL at 12:25

## 2021-04-22 RX ADMIN — SODIUM CHLORIDE: 9 INJECTION, SOLUTION INTRAVENOUS at 12:27

## 2021-04-22 RX ADMIN — Medication: at 05:15

## 2021-04-22 RX ADMIN — ENOXAPARIN SODIUM 40 MG: 40 INJECTION SUBCUTANEOUS at 08:48

## 2021-04-22 RX ADMIN — MULTIPLE VITAMINS W/ MINERALS TAB 1 TABLET: TAB at 08:47

## 2021-04-22 RX ADMIN — HYDROMORPHONE HYDROCHLORIDE 0.5 MG: 1 INJECTION, SOLUTION INTRAMUSCULAR; INTRAVENOUS; SUBCUTANEOUS at 16:45

## 2021-04-22 RX ADMIN — PIPERACILLIN SODIUM AND TAZOBACTAM SODIUM 3375 MG: 3; .375 INJECTION, POWDER, LYOPHILIZED, FOR SOLUTION INTRAVENOUS at 08:47

## 2021-04-22 RX ADMIN — Medication 25 ML: at 12:27

## 2021-04-22 RX ADMIN — HYDROMORPHONE HYDROCHLORIDE 0.5 MG: 1 INJECTION, SOLUTION INTRAMUSCULAR; INTRAVENOUS; SUBCUTANEOUS at 02:18

## 2021-04-22 RX ADMIN — CHLORDIAZEPOXIDE HYDROCHLORIDE 25 MG: 25 CAPSULE ORAL at 08:48

## 2021-04-22 RX ADMIN — QUETIAPINE FUMARATE 200 MG: 200 TABLET ORAL at 21:07

## 2021-04-22 RX ADMIN — HYDROMORPHONE HYDROCHLORIDE 0.25 MG: 1 INJECTION, SOLUTION INTRAMUSCULAR; INTRAVENOUS; SUBCUTANEOUS at 12:43

## 2021-04-22 RX ADMIN — CHLORDIAZEPOXIDE HYDROCHLORIDE 25 MG: 25 CAPSULE ORAL at 21:07

## 2021-04-22 RX ADMIN — Medication 100 MG: at 08:47

## 2021-04-22 RX ADMIN — QUETIAPINE FUMARATE 200 MG: 200 TABLET ORAL at 12:43

## 2021-04-22 ASSESSMENT — PAIN SCALES - GENERAL
PAINLEVEL_OUTOF10: 9
PAINLEVEL_OUTOF10: 5
PAINLEVEL_OUTOF10: 4
PAINLEVEL_OUTOF10: 10
PAINLEVEL_OUTOF10: 10
PAINLEVEL_OUTOF10: 9
PAINLEVEL_OUTOF10: 3
PAINLEVEL_OUTOF10: 10

## 2021-04-22 ASSESSMENT — PAIN DESCRIPTION - FREQUENCY: FREQUENCY: CONTINUOUS

## 2021-04-22 ASSESSMENT — PAIN DESCRIPTION - PAIN TYPE
TYPE: ACUTE PAIN
TYPE: ACUTE PAIN

## 2021-04-22 ASSESSMENT — PAIN DESCRIPTION - ONSET: ONSET: ON-GOING

## 2021-04-22 ASSESSMENT — PAIN DESCRIPTION - DESCRIPTORS
DESCRIPTORS: CRAMPING
DESCRIPTORS: CRAMPING;DISCOMFORT

## 2021-04-22 ASSESSMENT — PAIN DESCRIPTION - ORIENTATION: ORIENTATION: MID

## 2021-04-22 ASSESSMENT — PAIN DESCRIPTION - LOCATION: LOCATION: ABDOMEN

## 2021-04-22 NOTE — PLAN OF CARE
Problem: Pain:  Description: Pain management should include both nonpharmacologic and pharmacologic interventions.   Goal: Pain level will decrease  Description: Pain level will decrease  4/22/2021 0456 by Jefry Rodrigez RN  Outcome: Ongoing  4/22/2021 0456 by Jefry Rodrigez RN  Outcome: Ongoing  Goal: Control of acute pain  Description: Control of acute pain  4/22/2021 0456 by Jefry Rodrigez RN  Outcome: Ongoing  4/22/2021 0456 by Jefry Rodrigez RN  Outcome: Ongoing  Goal: Control of chronic pain  Description: Control of chronic pain  4/22/2021 0456 by Jefry Rodrigez RN  Outcome: Ongoing  4/22/2021 0456 by Jefry Rodrigez RN  Outcome: Ongoing  Goal: Patient's pain/discomfort is manageable  Description: Patient's pain/discomfort is manageable  Outcome: Ongoing     Problem: Infection:  Goal: Will remain free from infection  Description: Will remain free from infection  Outcome: Ongoing     Problem: Safety:  Goal: Free from accidental physical injury  Description: Free from accidental physical injury  Outcome: Ongoing  Goal: Free from intentional harm  Description: Free from intentional harm  Outcome: Ongoing     Problem: Daily Care:  Goal: Daily care needs are met  Description: Daily care needs are met  Outcome: Ongoing     Problem: Skin Integrity:  Goal: Skin integrity will stabilize  Description: Skin integrity will stabilize  Outcome: Ongoing     Problem: Discharge Planning:  Goal: Patients continuum of care needs are met  Description: Patients continuum of care needs are met  Outcome: Ongoing

## 2021-04-22 NOTE — PLAN OF CARE
Problem: Pain:  Goal: Pain level will decrease  Description: Pain level will decrease  4/22/2021 1047 by Marlon Pratt RN  Outcome: Ongoing  4/22/2021 0456 by César Nath RN  Outcome: Ongoing  4/22/2021 0456 by César Nath RN  Outcome: Ongoing  Goal: Control of acute pain  Description: Control of acute pain  4/22/2021 1047 by Marlon Pratt RN  Outcome: Ongoing  4/22/2021 0456 by César Naht RN  Outcome: Ongoing  4/22/2021 0456 by César Nath RN  Outcome: Ongoing  Goal: Control of chronic pain  Description: Control of chronic pain  4/22/2021 1047 by Marlon Pratt RN  Outcome: Ongoing  4/22/2021 0456 by César Nath RN  Outcome: Ongoing  4/22/2021 0456 by César Nath RN  Outcome: Ongoing  Goal: Patient's pain/discomfort is manageable  Description: Patient's pain/discomfort is manageable  4/22/2021 1047 by Marlon Pratt RN  Outcome: Ongoing  4/22/2021 0456 by César Nath RN  Outcome: Ongoing     Problem: Infection:  Goal: Will remain free from infection  Description: Will remain free from infection  4/22/2021 1047 by Marlon Pratt RN  Outcome: Ongoing  4/22/2021 0456 by César Nath RN  Outcome: Ongoing     Problem: Safety:  Goal: Free from accidental physical injury  Description: Free from accidental physical injury  4/22/2021 1047 by Marlon Pratt RN  Outcome: Ongoing  4/22/2021 0456 by César Nath RN  Outcome: Ongoing  Goal: Free from intentional harm  Description: Free from intentional harm  4/22/2021 1047 by Marlon Pratt RN  Outcome: Ongoing  4/22/2021 0456 by César Nath RN  Outcome: Ongoing     Problem: Daily Care:  Goal: Daily care needs are met  Description: Daily care needs are met  4/22/2021 1047 by Marlon Pratt RN  Outcome: Ongoing  4/22/2021 0456 by César Nath RN  Outcome: Ongoing     Problem: Skin Integrity:  Goal: Skin integrity will stabilize  Description: Skin integrity will stabilize  4/22/2021 1047 by Bernis Cancer, RN  Outcome: Ongoing  4/22/2021 0456 by César Nath, RN  Outcome: Ongoing     Problem: Discharge Planning:  Goal: Patients continuum of care needs are met  Description: Patients continuum of care needs are met  4/22/2021 1047 by Fior Harrington RN  Outcome: Ongoing  4/22/2021 0456 by Mayra Jauregui RN  Outcome: Ongoing

## 2021-04-22 NOTE — PROGRESS NOTES
Handoff report and transfer of care given at bedside to [address]  RN  Patient in stable condition, denies needs/concerns at this time. Call light within reach.

## 2021-04-22 NOTE — PROGRESS NOTES
Progress Note    Admit Date:  4/20/2021    Subjective:  Mr. Erna Nair states pain is improved some since he came in. Reports nausea but denies any vomiting. Pt has been drinking about 15 beers daily. His last drink was on 4/20. Pt does smoke about 2 ppd. Objective:   Patient Vitals for the past 4 hrs:   BP Temp Temp src Pulse Resp SpO2   04/22/21 0819 (!) 122/92 98.7 °F (37.1 °C) Oral 124 16 95 %            Intake/Output Summary (Last 24 hours) at 4/22/2021 1046  Last data filed at 4/22/2021 0819  Gross per 24 hour   Intake 2100 ml   Output 375 ml   Net 1725 ml       Physical Exam:  General appearance:  No apparent distress, resting in bed, appears drowsy, intermittently confused  HEENT:  Normal cephalic, atraumatic without obvious deformity. Conjunctivae/corneas clear. glasses  Neck: Supple,  Trachea midline. Respiratory:  Normal respiratory effort. Clear to auscultation, bilaterally without Rales/Wheezes/Rhonchi. On RA  Cardiovascular:  Tachycardic, rhythm normal S1/S2 without murmurs, rubs or gallops. Abdomen: Soft, diffusely tender, not distended, normal bowel sounds  Skin: Skin color, texture, turgor normal.  No rashes or lesions. Neurologic:  Neurovascularly intact without any focal sensory/motor deficits. Cranial nerves: II-XII intact, grossly non-focal.  Psychiatric:  Alert and oriented, thought content appropriate, normal insight  Peripheral Pulses: +2 palpable, equal bilaterally       I ANIKET RAMOS have reviewed the chart on Tae Jamison and personally interviewed and examined patient, reviewed the data (labs and imaging) and after discussion with my PA formulated the plan. Agree with note with the following edits. Physical exam:    BP (!) 122/92 Comment: notified rn Yady  Pulse 124   Temp 98.7 °F (37.1 °C) (Oral)   Resp 16   Ht 6' (1.829 m)   Wt 160 lb (72.6 kg)   SpO2 95%   BMI 21.70 kg/m²     Gen: No distress. Alert. Eyes: PERRL. No sclera icterus.  No conjunctival injection. ENT: No discharge. Pharynx clear. Neck: Trachea midline. Normal thyroid. Resp: No accessory muscle use. No crackles. No wheezes. No rhonchi. No dullness on percussion. CV: Regular rate. Regular rhythm. No murmur or rub. No edema. GI: Severe tenderness in epigastric and umbilical region. Non-distended. No masses. No organomegaly. Normal bowel sounds. No hernia. Skin: Warm and dry. No nodule on exposed extremities. No rash on exposed extremities. Lymph: No cervical LAD. No supraclavicular LAD. M/S: No cyanosis. No joint deformity. No clubbing. Neuro: Awake. Moves all 4 extremities, non focal  Psych: Oriented x 3. No anxiety or agitation.            LIZZ Medrano      Scheduled Meds:   enoxaparin  40 mg Subcutaneous Daily    thiamine  100 mg Oral Daily    multivitamin  1 tablet Oral Daily    QUEtiapine  200 mg Oral TID    nicotine  1 patch Transdermal Daily    chlordiazePOXIDE  25 mg Oral 4x Daily    piperacillin-tazobactam  3,375 mg Intravenous Q8H       Continuous Infusions:   lactated ringers 150 mL/hr at 04/21/21 1515       PRN Meds:  labetalol, acetaminophen, promethazine **OR** ondansetron, potassium chloride **OR** potassium alternative oral replacement **OR** potassium chloride, magnesium sulfate, HYDROmorphone **OR** HYDROmorphone      Data:  CBC:   Recent Labs     04/20/21 2300 04/22/21  0525   WBC 23.9* 16.4*   HGB 17.1 16.5   HCT 48.7 48.4   MCV 97.5 98.9    118*     BMP:   Recent Labs     04/20/21 2300 04/22/21  0525   * 135*   K 4.6 3.8   CL 96* 100   CO2 17* 23   BUN 10 11   CREATININE 0.8* 0.8*     LIVER PROFILE:   Recent Labs     04/20/21 2300 04/21/21  0515 04/22/21  0525   AST 84*  --  53*   ALT 40  --  21   LIPASE 1,173.0* 685.0* 253.0*   BILITOT 0.8  --  1.4*   ALKPHOS 116  --  75     CULTURES    SARS-COV-2 - Rapid: Not detected      RADIOLOGY    CT ABDOMEN PELVIS W IV CONTRAST Additional Contrast? None   Final Result   Acute pancreatitis with questionable developing necrosis in the pancreatic   tail. Fluid collection abutting the inferior aspect of the pancreatic head likely a   pseudocyst or abscess. Marked diffuse hepatic steatosis. 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 Dianne Zimmer  - Admitted to Med Surg  - check PCT  - NPO, aggressive IVF, Zosyn D#2, PRN Dilaudid and anti-emetics  - recommend abstinence from alcohol  - GI consulted    Leukocytosis  - 23.9 > 16.4  - likely 2/2 above  - trending down    Hepatic Steatosis  Elevated LFTs  - noted on CT abdomen  - LFTs trending down  - encouraged weight loss and alcohol cessation    Alcohol Abuse  Alcohol Withdrawal  - last drink was 4/20  - suspect pt is withdrawing, tachycardic on exam  - fall and seizure protocol  - cont [address] Librium, CIWA protocol    DVT Prophylaxis: Lovenox  Diet: Diet NPO Effective Now Exceptions are: Ice Chips  Code Status: Full Code     Decreased pain med dose 2/2 drowsiness & concern for pain seeking behaviors, add CIWA protocol for w/d symptoms     Hung Sy PA-C 11:09 AM 4/22/2021     Acute recurrent alcoholic pancreatitis with necrosis and possible abscess. N.p.o., IV fluids, pain control with Dilaudid. Continue IV Zosyn. GI consult. JOANNA Medrano.

## 2021-04-22 NOTE — PROGRESS NOTES
Shift assessment complete; see flow sheet. Scheduled medications administered; See MAR. IV infusing without difficulty. Pt c/o abdominal pain 10/10 PRN Dilaudid given at this time. Pt denies any needs at this time. Pt  and /101.  Notified charge and Clinical. Pt educated on use of call light and to call out with needs, verbalized understanding, bed in low locked position for pt safety

## 2021-04-23 LAB
A/G RATIO: 0.7 (ref 1.1–2.2)
ALBUMIN SERPL-MCNC: 2.1 G/DL (ref 3.4–5)
ALP BLD-CCNC: 62 U/L (ref 40–129)
ALT SERPL-CCNC: 20 U/L (ref 10–40)
AMPHETAMINE SCREEN, URINE: ABNORMAL
ANION GAP SERPL CALCULATED.3IONS-SCNC: 10 MMOL/L (ref 3–16)
AST SERPL-CCNC: 53 U/L (ref 15–37)
BACTERIA: ABNORMAL /HPF
BARBITURATE SCREEN URINE: ABNORMAL
BASOPHILS ABSOLUTE: 0 K/UL (ref 0–0.2)
BASOPHILS RELATIVE PERCENT: 0.1 %
BENZODIAZEPINE SCREEN, URINE: POSITIVE
BILIRUB SERPL-MCNC: 1.2 MG/DL (ref 0–1)
BILIRUBIN URINE: ABNORMAL
BLOOD, URINE: ABNORMAL
BUN BLDV-MCNC: 8 MG/DL (ref 7–20)
CALCIUM SERPL-MCNC: 7.9 MG/DL (ref 8.3–10.6)
CANNABINOID SCREEN URINE: POSITIVE
CHLORIDE BLD-SCNC: 97 MMOL/L (ref 99–110)
CLARITY: ABNORMAL
CO2: 24 MMOL/L (ref 21–32)
COCAINE METABOLITE SCREEN URINE: ABNORMAL
COLOR: ABNORMAL
CREAT SERPL-MCNC: 0.7 MG/DL (ref 0.9–1.3)
EOSINOPHILS ABSOLUTE: 0 K/UL (ref 0–0.6)
EOSINOPHILS RELATIVE PERCENT: 0.1 %
GFR AFRICAN AMERICAN: >60
GFR NON-AFRICAN AMERICAN: >60
GLOBULIN: 3 G/DL
GLUCOSE BLD-MCNC: 123 MG/DL (ref 70–99)
GLUCOSE URINE: NEGATIVE MG/DL
HCT VFR BLD CALC: 35.9 % (ref 40.5–52.5)
HEMOGLOBIN: 12.5 G/DL (ref 13.5–17.5)
KETONES, URINE: 40 MG/DL
LEUKOCYTE ESTERASE, URINE: NEGATIVE
LIPASE: 130 U/L (ref 13–60)
LYMPHOCYTES ABSOLUTE: 0.7 K/UL (ref 1–5.1)
LYMPHOCYTES RELATIVE PERCENT: 9.7 %
Lab: ABNORMAL
MCH RBC QN AUTO: 34.7 PG (ref 26–34)
MCHC RBC AUTO-ENTMCNC: 34.8 G/DL (ref 31–36)
MCV RBC AUTO: 99.7 FL (ref 80–100)
METHADONE SCREEN, URINE: POSITIVE
MICROSCOPIC EXAMINATION: YES
MONOCYTES ABSOLUTE: 0.6 K/UL (ref 0–1.3)
MONOCYTES RELATIVE PERCENT: 8.1 %
NEUTROPHILS ABSOLUTE: 6.1 K/UL (ref 1.7–7.7)
NEUTROPHILS RELATIVE PERCENT: 82 %
NITRITE, URINE: NEGATIVE
OPIATE SCREEN URINE: ABNORMAL
OXYCODONE URINE: POSITIVE
PDW BLD-RTO: 15.7 % (ref 12.4–15.4)
PH UA: 6
PH UA: 6 (ref 5–8)
PHENCYCLIDINE SCREEN URINE: ABNORMAL
PLATELET # BLD: 85 K/UL (ref 135–450)
PMV BLD AUTO: 8.7 FL (ref 5–10.5)
POTASSIUM SERPL-SCNC: 3.4 MMOL/L (ref 3.5–5.1)
PROPOXYPHENE SCREEN: ABNORMAL
PROTEIN UA: 100 MG/DL
RBC # BLD: 3.6 M/UL (ref 4.2–5.9)
RBC UA: ABNORMAL /HPF (ref 0–4)
SODIUM BLD-SCNC: 131 MMOL/L (ref 136–145)
SPECIFIC GRAVITY UA: >=1.03 (ref 1–1.03)
TOTAL PROTEIN: 5.1 G/DL (ref 6.4–8.2)
URINE REFLEX TO CULTURE: ABNORMAL
URINE TYPE: ABNORMAL
UROBILINOGEN, URINE: 0.2 E.U./DL
WBC # BLD: 7.5 K/UL (ref 4–11)
WBC UA: ABNORMAL /HPF (ref 0–5)

## 2021-04-23 PROCEDURE — 80053 COMPREHEN METABOLIC PANEL: CPT

## 2021-04-23 PROCEDURE — 6370000000 HC RX 637 (ALT 250 FOR IP): Performed by: INTERNAL MEDICINE

## 2021-04-23 PROCEDURE — 2500000003 HC RX 250 WO HCPCS: Performed by: INTERNAL MEDICINE

## 2021-04-23 PROCEDURE — 6360000002 HC RX W HCPCS: Performed by: INTERNAL MEDICINE

## 2021-04-23 PROCEDURE — 81001 URINALYSIS AUTO W/SCOPE: CPT

## 2021-04-23 PROCEDURE — 85025 COMPLETE CBC W/AUTO DIFF WBC: CPT

## 2021-04-23 PROCEDURE — 2580000003 HC RX 258: Performed by: INTERNAL MEDICINE

## 2021-04-23 PROCEDURE — 36415 COLL VENOUS BLD VENIPUNCTURE: CPT

## 2021-04-23 PROCEDURE — 1200000000 HC SEMI PRIVATE

## 2021-04-23 PROCEDURE — 83690 ASSAY OF LIPASE: CPT

## 2021-04-23 PROCEDURE — 80307 DRUG TEST PRSMV CHEM ANLYZR: CPT

## 2021-04-23 PROCEDURE — 2580000003 HC RX 258: Performed by: PHYSICIAN ASSISTANT

## 2021-04-23 PROCEDURE — 99232 SBSQ HOSP IP/OBS MODERATE 35: CPT | Performed by: INTERNAL MEDICINE

## 2021-04-23 RX ORDER — OXYCODONE HYDROCHLORIDE 5 MG/1
5 TABLET ORAL EVERY 4 HOURS PRN
Status: DISCONTINUED | OUTPATIENT
Start: 2021-04-23 | End: 2021-05-03

## 2021-04-23 RX ADMIN — Medication 100 MG: at 08:29

## 2021-04-23 RX ADMIN — OXYCODONE HYDROCHLORIDE 5 MG: 5 TABLET ORAL at 17:16

## 2021-04-23 RX ADMIN — PIPERACILLIN SODIUM AND TAZOBACTAM SODIUM 3375 MG: 3; .375 INJECTION, POWDER, LYOPHILIZED, FOR SOLUTION INTRAVENOUS at 01:38

## 2021-04-23 RX ADMIN — CHLORDIAZEPOXIDE HYDROCHLORIDE 25 MG: 25 CAPSULE ORAL at 16:53

## 2021-04-23 RX ADMIN — ONDANSETRON HYDROCHLORIDE 4 MG: 2 INJECTION, SOLUTION INTRAMUSCULAR; INTRAVENOUS at 04:56

## 2021-04-23 RX ADMIN — Medication 10 ML: at 08:30

## 2021-04-23 RX ADMIN — PIPERACILLIN SODIUM AND TAZOBACTAM SODIUM 3375 MG: 3; .375 INJECTION, POWDER, LYOPHILIZED, FOR SOLUTION INTRAVENOUS at 08:30

## 2021-04-23 RX ADMIN — QUETIAPINE FUMARATE 200 MG: 200 TABLET ORAL at 13:08

## 2021-04-23 RX ADMIN — OXYCODONE HYDROCHLORIDE 5 MG: 5 TABLET ORAL at 13:08

## 2021-04-23 RX ADMIN — OXYCODONE HYDROCHLORIDE 5 MG: 5 TABLET ORAL at 09:12

## 2021-04-23 RX ADMIN — HYDROMORPHONE HYDROCHLORIDE 0.5 MG: 1 INJECTION, SOLUTION INTRAMUSCULAR; INTRAVENOUS; SUBCUTANEOUS at 05:59

## 2021-04-23 RX ADMIN — Medication 10 ML: at 20:16

## 2021-04-23 RX ADMIN — QUETIAPINE FUMARATE 200 MG: 200 TABLET ORAL at 20:16

## 2021-04-23 RX ADMIN — PIPERACILLIN SODIUM AND TAZOBACTAM SODIUM 3375 MG: 3; .375 INJECTION, POWDER, LYOPHILIZED, FOR SOLUTION INTRAVENOUS at 16:56

## 2021-04-23 RX ADMIN — QUETIAPINE FUMARATE 200 MG: 200 TABLET ORAL at 08:29

## 2021-04-23 RX ADMIN — CHLORDIAZEPOXIDE HYDROCHLORIDE 25 MG: 25 CAPSULE ORAL at 20:16

## 2021-04-23 RX ADMIN — CHLORDIAZEPOXIDE HYDROCHLORIDE 25 MG: 25 CAPSULE ORAL at 08:29

## 2021-04-23 RX ADMIN — MULTIPLE VITAMINS W/ MINERALS TAB 1 TABLET: TAB at 08:29

## 2021-04-23 RX ADMIN — SODIUM CHLORIDE: 9 INJECTION, SOLUTION INTRAVENOUS at 16:53

## 2021-04-23 RX ADMIN — Medication 20 MG: at 05:34

## 2021-04-23 RX ADMIN — ENOXAPARIN SODIUM 40 MG: 40 INJECTION SUBCUTANEOUS at 08:29

## 2021-04-23 RX ADMIN — HYDROMORPHONE HYDROCHLORIDE 0.5 MG: 1 INJECTION, SOLUTION INTRAMUSCULAR; INTRAVENOUS; SUBCUTANEOUS at 01:39

## 2021-04-23 RX ADMIN — OXYCODONE HYDROCHLORIDE 5 MG: 5 TABLET ORAL at 21:16

## 2021-04-23 RX ADMIN — CHLORDIAZEPOXIDE HYDROCHLORIDE 25 MG: 25 CAPSULE ORAL at 13:08

## 2021-04-23 RX ADMIN — POTASSIUM CHLORIDE 40 MEQ: 1500 TABLET, EXTENDED RELEASE ORAL at 08:29

## 2021-04-23 ASSESSMENT — PAIN DESCRIPTION - ORIENTATION
ORIENTATION: MID;LOWER;LEFT
ORIENTATION: MID;LOWER;LEFT

## 2021-04-23 ASSESSMENT — PAIN DESCRIPTION - DESCRIPTORS
DESCRIPTORS: SHARP
DESCRIPTORS: JABBING

## 2021-04-23 ASSESSMENT — PAIN DESCRIPTION - LOCATION
LOCATION: ABDOMEN
LOCATION: ABDOMEN

## 2021-04-23 ASSESSMENT — PAIN DESCRIPTION - ONSET: ONSET: ON-GOING

## 2021-04-23 ASSESSMENT — PAIN SCALES - GENERAL
PAINLEVEL_OUTOF10: 9
PAINLEVEL_OUTOF10: 8
PAINLEVEL_OUTOF10: 6

## 2021-04-23 ASSESSMENT — PAIN DESCRIPTION - FREQUENCY: FREQUENCY: CONTINUOUS

## 2021-04-23 ASSESSMENT — PAIN DESCRIPTION - PAIN TYPE
TYPE: ACUTE PAIN
TYPE: ACUTE PAIN

## 2021-04-23 NOTE — CARE COORDINATION
Order to Social Work for [employer] consideration of rehab\" has been acknowledged.  Per note from RN JULIA on this date, pt declined resource folder

## 2021-04-23 NOTE — PROGRESS NOTES
Pt's BP elevated @ 156/105 and . PRN labetalol given per [facility] and pt placed on telemetry. Will continue to monitor.

## 2021-04-23 NOTE — PROGRESS NOTES
PROGRESS NOTE  S:45 yrs Patient  admitted on 4/20/2021 with Acute recurrent pancreatitis [K85.90] . Leukocytosis improving. Abdominal pain improving. Tolerating liquids    Current Hospital Schedued Meds   sodium chloride flush  5-40 mL Intravenous 2 times per day    thiamine  100 mg Oral Daily    multivitamin  1 tablet Oral Daily    QUEtiapine  200 mg Oral TID    nicotine  1 patch Transdermal Daily    chlordiazePOXIDE  25 mg Oral 4x Daily    piperacillin-tazobactam  3,375 mg Intravenous Q8H     Current Hospital IV Meds   sodium chloride      sodium chloride 150 mL/hr at 04/23/21 1653     Current Hospital PRN Meds  oxyCODONE, labetalol, sodium chloride flush, sodium chloride, LORazepam **OR** LORazepam **OR** LORazepam **OR** LORazepam **OR** LORazepam **OR** LORazepam **OR** LORazepam **OR** LORazepam, acetaminophen, promethazine **OR** ondansetron, potassium chloride **OR** potassium alternative oral replacement **OR** potassium chloride, magnesium sulfate    Exam:   Vitals:    04/23/21 1909   BP: 128/77   Pulse: 135   Resp: 16   Temp: 99.3 °F (37.4 °C)   SpO2: 90%     No intake/output data recorded.    General appearance: alert, appears stated age and cooperative  HEENT: PERRLA  Neck: no adenopathy, no carotid bruit, no JVD, supple, symmetrical, trachea midline and thyroid not enlarged, symmetric, no tenderness/mass/nodules  Lungs: clear to auscultation bilaterally  Heart: regular rate and rhythm, S1, S2 normal, no murmur, click, rub or gallop  Abdomen: soft, non-tender; bowel sounds normal; no masses,  no organomegaly  Extremities: extremities normal, atraumatic, no cyanosis or edema     Labs:  CBC:   Recent Labs     04/20/21 2300 04/22/21  0525 04/23/21  0534   WBC 23.9* 16.4* 7.5   HGB 17.1 16.5 12.5*   HCT 48.7 48.4 35.9*   MCV 97.5 98.9 99.7    118* 85*     BMP:   Recent Labs     04/20/21 2300 04/22/21  0525 04/23/21  0534   * 135* 131*   K 4.6 3.8 3.4*   CL 96* 100 97*   CO2 17* 23 24   BUN 10 11 8   CREATININE 0.8* 0.8* 0.7*     LIVER PROFILE:   Recent Labs     04/20/21  2300 04/21/21  0515 04/22/21  0525 04/23/21  0534   AST 84*  --  53* 53*   ALT 40  --  21 20   LIPASE 1,173.0* 685.0* 253.0* 130.0*   PROT 6.6  --  5.4* 5.1*   BILITOT 0.8  --  1.4* 1.2*   ALKPHOS 116  --  75 62     PT/INR: No results for input(s): INR in the last 72 hours. Invalid input(s): PT    IMAGING:  No results found. Hospital Problems           Last Modified POA    Alcohol abuse 4/22/2021 Yes    Acute recurrent pancreatitis 4/21/2021 Yes    Alcohol withdrawal syndrome with complication ([address] 9/91/4295 Yes         Impression:  40 yo male with recurrent alcoholic pancreatitis    Recommendation:  1. Continue alcohol abstinence  2. Advance diet as tolerated  3.  Follow up with Dr. Coleen Hernandez as outpatient      Christa Matias DO  7:12 PM 4/23/2021            [facility]    [address]      [address] [place] [address]     Phone: [phone]     Fax: [phone]

## 2021-04-23 NOTE — PROGRESS NOTES
potassium chloride **OR** potassium alternative oral replacement **OR** potassium chloride, magnesium sulfate      Data:  CBC:   Recent Labs     04/20/21  2300 04/22/21  0525 04/23/21  0534   WBC 23.9* 16.4* 7.5   HGB 17.1 16.5 12.5*   HCT 48.7 48.4 35.9*   MCV 97.5 98.9 99.7    118* 85*     BMP:   Recent Labs     04/20/21 2300 04/22/21  0525 04/23/21  0534   * 135* 131*   K 4.6 3.8 3.4*   CL 96* 100 97*   CO2 17* 23 24   BUN 10 11 8   CREATININE 0.8* 0.8* 0.7*     LIVER PROFILE:   Recent Labs     04/20/21 2300 04/21/21  0515 04/22/21  0525 04/23/21  0534   AST 84*  --  53* 53*   ALT 40  --  21 20   LIPASE 1,173.0* 685.0* 253.0* 130.0*   BILITOT 0.8  --  1.4* 1.2*   ALKPHOS 116  --  75 62     CULTURES    SARS-COV-2 - Rapid: Not detected      RADIOLOGY    CT ABDOMEN PELVIS W IV CONTRAST Additional Contrast? None   Final Result   Acute pancreatitis with questionable developing necrosis in the pancreatic   tail. Fluid collection abutting the inferior aspect of the pancreatic head likely a   pseudocyst or abscess. Marked diffuse hepatic steatosis. 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 Lorenzo Hayes  - Admitted to Med Surg  - NPO, aggressive IVF, Zosyn D#3, PRN Dilaudid and anti-emetics  - recommend abstinence from alcohol  - GI consulted  Pain better,  Leucocytosis resolved   Start clears  D/c dilaudid.  Switch to oxycodone    Leukocytosis  - 23.9 > 16.4-- 7.5  - likely 2/2 above  - trending down    Hepatic Steatosis  Elevated LFTs  - noted on CT abdomen  - LFTs trending down  - encouraged weight loss and alcohol cessation    Alcohol Abuse  Alcohol Withdrawal  - last drink was 4/20  - suspect pt is withdrawing, tachycardic on exam  - fall and seizure protocol  - cont [address] Librium, CIWA protocol    DVT Prophylaxis: Lovenox  Diet:clears  Code Status: Full Code       JOANNA Medrano.

## 2021-04-23 NOTE — PROGRESS NOTES
Bedside report and Pt care transferred to [facility]. Pt denies any assistance at this time.

## 2021-04-23 NOTE — PLAN OF CARE
Problem: Pain:  Goal: Pain level will decrease  Description: Pain level will decrease  4/22/2021 2319 by Randi Resendez RN  Outcome: Ongoing  4/22/2021 1047 by Jerson Lira RN  Outcome: Ongoing  Goal: Control of acute pain  Description: Control of acute pain  4/22/2021 2319 by Randi Resendez RN  Outcome: Ongoing  Note: PRN dilaudid q 4 hr  4/22/2021 1047 by Jerson Lira RN  Outcome: Ongoing  Goal: Control of chronic pain  Description: Control of chronic pain  4/22/2021 2319 by Randi Resendez RN  Outcome: Ongoing  4/22/2021 1047 by Jerson Lira RN  Outcome: Ongoing  Goal: Patient's pain/discomfort is manageable  Description: Patient's pain/discomfort is manageable  4/22/2021 2319 by Randi Resendez RN  Outcome: Ongoing  4/22/2021 1047 by Jerson Lira RN  Outcome: Ongoing     Problem: Infection:  Goal: Will remain free from infection  Description: Will remain free from infection  4/22/2021 2319 by Randi Resendez RN  Outcome: Ongoing  4/22/2021 1047 by Jerson Lira RN  Outcome: Ongoing     Problem: Safety:  Goal: Free from accidental physical injury  Description: Free from accidental physical injury  4/22/2021 2319 by Randi Resendez RN  Outcome: Ongoing  4/22/2021 1047 by Jerson Lira RN  Outcome: Ongoing  Goal: Free from intentional harm  Description: Free from intentional harm  4/22/2021 2319 by Randi Resendez RN  Outcome: Ongoing  4/22/2021 1047 by Jerson Lira RN  Outcome: Ongoing     Problem: Daily Care:  Goal: Daily care needs are met  Description: Daily care needs are met  4/22/2021 2319 by Randi Resendez RN  Outcome: Ongoing  4/22/2021 1047 by Jerson Lira RN  Outcome: Ongoing     Problem: Skin Integrity:  Goal: Skin integrity will stabilize  Description: Skin integrity will stabilize  4/22/2021 2319 by Randi Resendez RN  Outcome: Ongoing  4/22/2021 1047 by Jerson Lira RN  Outcome: Ongoing     Problem: Discharge Planning:  Goal: Patients continuum of care needs are met  Description: Patients continuum of care needs are met  4/22/2021 2319 by Stoney Stanley RN  Outcome: Ongoing  4/22/2021 1047 by Jeffrey Bacon RN  Outcome: Ongoing

## 2021-04-23 NOTE — CARE COORDINATION
INTERDISCIPLINARY PLAN OF CARE CONFERENCE    Date/Time: 4/23/2021 10:03 AM  Completed by: Jhonny Zaragoza, Case Management      Patient Name:  Yury Lincoln  YOB: 1975  Admitting Diagnosis: Acute recurrent pancreatitis [K85.90]     Admit Date/Time:  4/20/2021 10:47 PM    Chart reviewed. Interdisciplinary team contacted or reviewed plan related to patient progress and discharge plans. Disciplines included Case Management, Nursing, and Dietitian. Current Status:D/c dilaudid. Switch to oxycodone and start clears  PT/OT recommendation for discharge plan of care: NA    Expected D/C Disposition:  Home  Confirmed plan with patient and/or family Yes confirmed with: (name) pt  Met with:pt  Discharge Plan Comments: Chart reviewed. Met with pt at bedside. Pt continues with plan to return home. Denies Resource folder or ETOH/tx. family is supportive. Will follow.     Home O2 in place on admit: No

## 2021-04-23 NOTE — PROGRESS NOTES
Discussed with nursing. Pain improving. WBC improved. Continue current management. Please call with questions or concerns. Consider advancing to liquids tomorrow.

## 2021-04-24 LAB
A/G RATIO: 0.7 (ref 1.1–2.2)
ALBUMIN SERPL-MCNC: 2.1 G/DL (ref 3.4–5)
ALP BLD-CCNC: 70 U/L (ref 40–129)
ALT SERPL-CCNC: 26 U/L (ref 10–40)
ANION GAP SERPL CALCULATED.3IONS-SCNC: 13 MMOL/L (ref 3–16)
AST SERPL-CCNC: 64 U/L (ref 15–37)
BILIRUB SERPL-MCNC: 1.1 MG/DL (ref 0–1)
BUN BLDV-MCNC: 4 MG/DL (ref 7–20)
CALCIUM SERPL-MCNC: 7.7 MG/DL (ref 8.3–10.6)
CHLORIDE BLD-SCNC: 95 MMOL/L (ref 99–110)
CO2: 22 MMOL/L (ref 21–32)
CREAT SERPL-MCNC: 0.6 MG/DL (ref 0.9–1.3)
GFR AFRICAN AMERICAN: >60
GFR NON-AFRICAN AMERICAN: >60
GLOBULIN: 3.1 G/DL
GLUCOSE BLD-MCNC: 97 MG/DL (ref 70–99)
HCT VFR BLD CALC: 31.6 % (ref 40.5–52.5)
HEMOGLOBIN: 11 G/DL (ref 13.5–17.5)
MCH RBC QN AUTO: 34.7 PG (ref 26–34)
MCHC RBC AUTO-ENTMCNC: 34.7 G/DL (ref 31–36)
MCV RBC AUTO: 100.2 FL (ref 80–100)
PDW BLD-RTO: 15.7 % (ref 12.4–15.4)
PLATELET # BLD: 94 K/UL (ref 135–450)
PMV BLD AUTO: 8.3 FL (ref 5–10.5)
POTASSIUM SERPL-SCNC: 3.4 MMOL/L (ref 3.5–5.1)
RBC # BLD: 3.15 M/UL (ref 4.2–5.9)
SODIUM BLD-SCNC: 130 MMOL/L (ref 136–145)
TOTAL PROTEIN: 5.2 G/DL (ref 6.4–8.2)
WBC # BLD: 6.1 K/UL (ref 4–11)

## 2021-04-24 PROCEDURE — 2580000003 HC RX 258: Performed by: INTERNAL MEDICINE

## 2021-04-24 PROCEDURE — 2580000003 HC RX 258

## 2021-04-24 PROCEDURE — 6370000000 HC RX 637 (ALT 250 FOR IP): Performed by: INTERNAL MEDICINE

## 2021-04-24 PROCEDURE — 6360000002 HC RX W HCPCS: Performed by: INTERNAL MEDICINE

## 2021-04-24 PROCEDURE — 36415 COLL VENOUS BLD VENIPUNCTURE: CPT

## 2021-04-24 PROCEDURE — 99232 SBSQ HOSP IP/OBS MODERATE 35: CPT | Performed by: INTERNAL MEDICINE

## 2021-04-24 PROCEDURE — 2580000003 HC RX 258: Performed by: PHYSICIAN ASSISTANT

## 2021-04-24 PROCEDURE — 1200000000 HC SEMI PRIVATE

## 2021-04-24 PROCEDURE — 85027 COMPLETE CBC AUTOMATED: CPT

## 2021-04-24 PROCEDURE — 80053 COMPREHEN METABOLIC PANEL: CPT

## 2021-04-24 RX ORDER — CLONIDINE HYDROCHLORIDE 0.1 MG/1
0.1 TABLET ORAL 3 TIMES DAILY
Status: DISCONTINUED | OUTPATIENT
Start: 2021-04-24 | End: 2021-05-07

## 2021-04-24 RX ORDER — POTASSIUM CHLORIDE 20 MEQ/1
20 TABLET, EXTENDED RELEASE ORAL ONCE
Status: COMPLETED | OUTPATIENT
Start: 2021-04-24 | End: 2021-04-24

## 2021-04-24 RX ORDER — SODIUM CHLORIDE 9 MG/ML
INJECTION, SOLUTION INTRAVENOUS
Status: COMPLETED
Start: 2021-04-24 | End: 2021-04-24

## 2021-04-24 RX ADMIN — CLONIDINE HYDROCHLORIDE 0.1 MG: 0.1 TABLET ORAL at 19:57

## 2021-04-24 RX ADMIN — OXYCODONE HYDROCHLORIDE 5 MG: 5 TABLET ORAL at 21:59

## 2021-04-24 RX ADMIN — QUETIAPINE FUMARATE 200 MG: 200 TABLET ORAL at 19:57

## 2021-04-24 RX ADMIN — SODIUM CHLORIDE 25 ML: 9 INJECTION, SOLUTION INTRAVENOUS at 00:59

## 2021-04-24 RX ADMIN — CLONIDINE HYDROCHLORIDE 0.1 MG: 0.1 TABLET ORAL at 09:34

## 2021-04-24 RX ADMIN — SODIUM CHLORIDE: 9 INJECTION, SOLUTION INTRAVENOUS at 09:45

## 2021-04-24 RX ADMIN — POTASSIUM CHLORIDE 20 MEQ: 1500 TABLET, EXTENDED RELEASE ORAL at 13:51

## 2021-04-24 RX ADMIN — PIPERACILLIN SODIUM AND TAZOBACTAM SODIUM 3375 MG: 3; .375 INJECTION, POWDER, LYOPHILIZED, FOR SOLUTION INTRAVENOUS at 00:59

## 2021-04-24 RX ADMIN — PIPERACILLIN SODIUM AND TAZOBACTAM SODIUM 3375 MG: 3; .375 INJECTION, POWDER, LYOPHILIZED, FOR SOLUTION INTRAVENOUS at 16:46

## 2021-04-24 RX ADMIN — CHLORDIAZEPOXIDE HYDROCHLORIDE 25 MG: 25 CAPSULE ORAL at 13:50

## 2021-04-24 RX ADMIN — PIPERACILLIN SODIUM AND TAZOBACTAM SODIUM 3375 MG: 3; .375 INJECTION, POWDER, LYOPHILIZED, FOR SOLUTION INTRAVENOUS at 09:50

## 2021-04-24 RX ADMIN — SODIUM CHLORIDE: 9 INJECTION, SOLUTION INTRAVENOUS at 19:34

## 2021-04-24 RX ADMIN — OXYCODONE HYDROCHLORIDE 5 MG: 5 TABLET ORAL at 05:22

## 2021-04-24 RX ADMIN — MULTIPLE VITAMINS W/ MINERALS TAB 1 TABLET: TAB at 09:34

## 2021-04-24 RX ADMIN — OXYCODONE HYDROCHLORIDE 5 MG: 5 TABLET ORAL at 18:02

## 2021-04-24 RX ADMIN — OXYCODONE HYDROCHLORIDE 5 MG: 5 TABLET ORAL at 01:03

## 2021-04-24 RX ADMIN — QUETIAPINE FUMARATE 200 MG: 200 TABLET ORAL at 13:51

## 2021-04-24 RX ADMIN — OXYCODONE HYDROCHLORIDE 5 MG: 5 TABLET ORAL at 09:34

## 2021-04-24 RX ADMIN — CHLORDIAZEPOXIDE HYDROCHLORIDE 25 MG: 25 CAPSULE ORAL at 09:34

## 2021-04-24 RX ADMIN — Medication 10 ML: at 19:57

## 2021-04-24 RX ADMIN — Medication 100 MG: at 09:34

## 2021-04-24 RX ADMIN — CLONIDINE HYDROCHLORIDE 0.1 MG: 0.1 TABLET ORAL at 13:50

## 2021-04-24 RX ADMIN — OXYCODONE HYDROCHLORIDE 5 MG: 5 TABLET ORAL at 13:50

## 2021-04-24 RX ADMIN — PROMETHAZINE HYDROCHLORIDE 12.5 MG: 25 TABLET ORAL at 08:26

## 2021-04-24 RX ADMIN — SODIUM CHLORIDE: 9 INJECTION, SOLUTION INTRAVENOUS at 00:59

## 2021-04-24 RX ADMIN — QUETIAPINE FUMARATE 200 MG: 200 TABLET ORAL at 09:34

## 2021-04-24 RX ADMIN — CHLORDIAZEPOXIDE HYDROCHLORIDE 25 MG: 25 CAPSULE ORAL at 16:46

## 2021-04-24 RX ADMIN — CHLORDIAZEPOXIDE HYDROCHLORIDE 25 MG: 25 CAPSULE ORAL at 19:57

## 2021-04-24 ASSESSMENT — PAIN SCALES - GENERAL
PAINLEVEL_OUTOF10: 8
PAINLEVEL_OUTOF10: 4
PAINLEVEL_OUTOF10: 8
PAINLEVEL_OUTOF10: 6
PAINLEVEL_OUTOF10: 8

## 2021-04-24 ASSESSMENT — PAIN DESCRIPTION - LOCATION
LOCATION: ABDOMEN

## 2021-04-24 ASSESSMENT — PAIN DESCRIPTION - DESCRIPTORS: DESCRIPTORS: ACHING

## 2021-04-24 ASSESSMENT — PAIN DESCRIPTION - PROGRESSION
CLINICAL_PROGRESSION: GRADUALLY WORSENING
CLINICAL_PROGRESSION: GRADUALLY IMPROVING

## 2021-04-24 ASSESSMENT — PAIN DESCRIPTION - FREQUENCY
FREQUENCY: CONTINUOUS
FREQUENCY: CONTINUOUS

## 2021-04-24 ASSESSMENT — PAIN DESCRIPTION - ONSET
ONSET: ON-GOING

## 2021-04-24 ASSESSMENT — PAIN DESCRIPTION - ORIENTATION
ORIENTATION: MID
ORIENTATION: MID

## 2021-04-24 ASSESSMENT — PAIN DESCRIPTION - PAIN TYPE
TYPE: ACUTE PAIN
TYPE: ACUTE PAIN

## 2021-04-24 ASSESSMENT — PAIN - FUNCTIONAL ASSESSMENT: PAIN_FUNCTIONAL_ASSESSMENT: ACTIVITIES ARE NOT PREVENTED

## 2021-04-24 NOTE — PROGRESS NOTES
04/22/21  0525 04/23/21  0534 04/24/21  0512   * 131* 130*   K 3.8 3.4* 3.4*    97* 95*   CO2 23 24 22   BUN 11 8 4*   CREATININE 0.8* 0.7* 0.6*     LIVER PROFILE:   Recent Labs     04/22/21  0525 04/23/21  0534 04/24/21  0512   AST 53* 53* 64*   ALT 21 20 26   LIPASE 253.0* 130.0*  --    PROT 5.4* 5.1* 5.2*   BILITOT 1.4* 1.2* 1.1*   ALKPHOS 75 62 70     PT/INR: No results for input(s): INR in the last 72 hours. Invalid input(s): PT    IMAGING:  No results found. Hospital Problems           Last Modified POA    Alcohol abuse 4/22/2021 Yes    Acute recurrent pancreatitis 4/21/2021 Yes    Alcohol withdrawal syndrome with complication ([facility] [address] 1/67/0458 Yes         Impression:  38 yo male with recurrent alcoholic pancreatitis    Recommendation:  1.  Advance diet as tolerated  2. GI clinic fu after discharge      Shoaib Silva DO  2:43 PM 4/24/2021            [address] [place] [address]    [address]      [address] [facility]     Phone: [phone]     Fax: [phone]

## 2021-04-24 NOTE — PROGRESS NOTES
Pt resting. Resp e/e. Shift assessment completed and charted. No needs. Will monitor.  Costa Tavares

## 2021-04-24 NOTE — PROGRESS NOTES
Perfect serve sent to Dr. Ashley Weinstein to verify scheduled telemetry discontinue in the AM and pt has been tachy in the 120's to 130's.  Conrad Estevez

## 2021-04-24 NOTE — PROGRESS NOTES
Progress Note    Admit Date:  4/20/2021    Patient admitted with acute recurrent pancreatitis secondary to alcohol abuse. Subjective:  Mr. Minesh Ricketts complains of persistent abdominal pain . He is tachycardic . Reports nausea but denies any vomiting. Diet was advanced to clear liquid diet yesterday. He is not been tolerating this. He complains of increased abdominal pain; he is sitting in bed and doubled over in pain, abdominal pain is an 8 on a scale of 1-10. Kwabena Davina He is tachycardic starting to have some withdrawal symptoms also      Objective:   Patient Vitals for the past 4 hrs:   BP Temp Temp src Pulse Resp SpO2   04/24/21 0803 (!) 148/89 98 °F (36.7 °C) Oral 121 16 91 %            Intake/Output Summary (Last 24 hours) at 4/24/2021 1022  Last data filed at 4/24/2021 0803  Gross per 24 hour   Intake 6447 ml   Output 1100 ml   Net 5347 ml       Physical Exam:  General appearance: Patient appears to be in mild distress  Awake alert and oriented  HEENT:  Normal cephalic, atraumatic without obvious deformity. Conjunctivae/corneas clear. glasses  Neck: Supple,  Trachea midline. Respiratory:  Normal respiratory effort. Clear to auscultation, bilaterally without Rales/Wheezes/Rhonchi. On RA  Cardiovascular:  Tachycardic, rhythm normal S1/S2 without murmurs, rubs or gallops. Abdomen: Diffusely tender, mildly distended, normal bowel sounds  Skin: Skin color, texture, turgor normal.  No rashes or lesions. Neurologic:  Neurovascularly intact without any focal sensory/motor deficits.  Cranial nerves: II-XII intact, grossly non-focal.  Psychiatric:  Alert and oriented, thought content appropriate, normal insight  Peripheral Pulses: +2 palpable, equal bilaterally         Scheduled Meds:   cloNIDine  0.1 mg Oral TID    potassium chloride  20 mEq Oral Once    sodium chloride flush  5-40 mL Intravenous 2 times per day    thiamine  100 mg Oral Daily    multivitamin  1 tablet Oral Daily    QUEtiapine  200 mg Oral TID Workman  - Admitted to Med Surg  - NPO, aggressive IVF, Zosyn D#3, PRN Dilaudid and anti-emetics  - recommend abstinence from alcohol  - GI consulted  -  4/23 ->Pain better, Leucocytosis resolved , Started on clears  D/pankaj dilaudid. Switch to oxycodone  - 4/24 He has not tolerated clear liquid diet-> will make him n.p.o. again, continue IV hydration.    We will keep on oxycodone as needed for pain and monitor    Leukocytosis  - 23.9 > 16.4 > 7.5  - likely 2/2 above  - trending down  Monitor white blood count    Hypokalemia   - replete    Hepatic Steatosis  Elevated LFTs  - noted on CT abdomen  - LFTs trending down  - encouraged weight loss and alcohol cessation  Monitor LFTs    Alcohol Abuse  Alcohol Withdrawal  - last drink was 4/20  - suspect pt is withdrawing, tachycardic on exam  - fall and seizure protocol  - cont [facility] Librium, CIWA protocol  - He is getting tachycardic likely from alcohol withdrawal syndrome will start the patient on scheduled clonidine    Hyponatremia   -monitor with IV hydration      DVT Prophylaxis: Lovenox  Diet: NPO  Code Status: Full Code        Adora Gilford, MD   4/24/2021

## 2021-04-25 LAB
A/G RATIO: 0.8 (ref 1.1–2.2)
ALBUMIN SERPL-MCNC: 2.1 G/DL (ref 3.4–5)
ALP BLD-CCNC: 74 U/L (ref 40–129)
ALT SERPL-CCNC: 33 U/L (ref 10–40)
ANION GAP SERPL CALCULATED.3IONS-SCNC: 16 MMOL/L (ref 3–16)
AST SERPL-CCNC: 58 U/L (ref 15–37)
BASOPHILS ABSOLUTE: 0 K/UL (ref 0–0.2)
BASOPHILS RELATIVE PERCENT: 0.5 %
BILIRUB SERPL-MCNC: 0.8 MG/DL (ref 0–1)
BUN BLDV-MCNC: 4 MG/DL (ref 7–20)
CALCIUM SERPL-MCNC: 7.8 MG/DL (ref 8.3–10.6)
CHLORIDE BLD-SCNC: 98 MMOL/L (ref 99–110)
CO2: 18 MMOL/L (ref 21–32)
CREAT SERPL-MCNC: 0.6 MG/DL (ref 0.9–1.3)
EOSINOPHILS ABSOLUTE: 0.1 K/UL (ref 0–0.6)
EOSINOPHILS RELATIVE PERCENT: 0.8 %
GFR AFRICAN AMERICAN: >60
GFR NON-AFRICAN AMERICAN: >60
GLOBULIN: 2.7 G/DL
GLUCOSE BLD-MCNC: 77 MG/DL (ref 70–99)
HCT VFR BLD CALC: 27.8 % (ref 40.5–52.5)
HEMOGLOBIN: 9.5 G/DL (ref 13.5–17.5)
LYMPHOCYTES ABSOLUTE: 0.5 K/UL (ref 1–5.1)
LYMPHOCYTES RELATIVE PERCENT: 8.7 %
MAGNESIUM: 1.7 MG/DL (ref 1.8–2.4)
MCH RBC QN AUTO: 35 PG (ref 26–34)
MCHC RBC AUTO-ENTMCNC: 34.2 G/DL (ref 31–36)
MCV RBC AUTO: 102.2 FL (ref 80–100)
MONOCYTES ABSOLUTE: 1 K/UL (ref 0–1.3)
MONOCYTES RELATIVE PERCENT: 16.2 %
NEUTROPHILS ABSOLUTE: 4.6 K/UL (ref 1.7–7.7)
NEUTROPHILS RELATIVE PERCENT: 73.8 %
PDW BLD-RTO: 15.5 % (ref 12.4–15.4)
PHOSPHORUS: 2 MG/DL (ref 2.5–4.9)
PLATELET # BLD: 113 K/UL (ref 135–450)
PMV BLD AUTO: 8 FL (ref 5–10.5)
POTASSIUM REFLEX MAGNESIUM: 3 MMOL/L (ref 3.5–5.1)
RBC # BLD: 2.72 M/UL (ref 4.2–5.9)
SODIUM BLD-SCNC: 132 MMOL/L (ref 136–145)
TOTAL PROTEIN: 4.8 G/DL (ref 6.4–8.2)
WBC # BLD: 6.2 K/UL (ref 4–11)

## 2021-04-25 PROCEDURE — 6370000000 HC RX 637 (ALT 250 FOR IP): Performed by: INTERNAL MEDICINE

## 2021-04-25 PROCEDURE — 2580000003 HC RX 258: Performed by: INTERNAL MEDICINE

## 2021-04-25 PROCEDURE — 2580000003 HC RX 258: Performed by: PHYSICIAN ASSISTANT

## 2021-04-25 PROCEDURE — 6360000002 HC RX W HCPCS: Performed by: INTERNAL MEDICINE

## 2021-04-25 PROCEDURE — 6370000000 HC RX 637 (ALT 250 FOR IP): Performed by: PHYSICIAN ASSISTANT

## 2021-04-25 PROCEDURE — 1200000000 HC SEMI PRIVATE

## 2021-04-25 PROCEDURE — 83735 ASSAY OF MAGNESIUM: CPT

## 2021-04-25 PROCEDURE — 80053 COMPREHEN METABOLIC PANEL: CPT

## 2021-04-25 PROCEDURE — 84100 ASSAY OF PHOSPHORUS: CPT

## 2021-04-25 PROCEDURE — 99232 SBSQ HOSP IP/OBS MODERATE 35: CPT | Performed by: INTERNAL MEDICINE

## 2021-04-25 PROCEDURE — 2500000003 HC RX 250 WO HCPCS: Performed by: INTERNAL MEDICINE

## 2021-04-25 PROCEDURE — 36415 COLL VENOUS BLD VENIPUNCTURE: CPT

## 2021-04-25 PROCEDURE — 85025 COMPLETE CBC W/AUTO DIFF WBC: CPT

## 2021-04-25 RX ORDER — MAGNESIUM SULFATE 1 G/100ML
1000 INJECTION INTRAVENOUS ONCE
Status: COMPLETED | OUTPATIENT
Start: 2021-04-25 | End: 2021-04-25

## 2021-04-25 RX ADMIN — SODIUM CHLORIDE: 9 INJECTION, SOLUTION INTRAVENOUS at 14:52

## 2021-04-25 RX ADMIN — Medication 100 MG: at 08:03

## 2021-04-25 RX ADMIN — PIPERACILLIN SODIUM AND TAZOBACTAM SODIUM 3375 MG: 3; .375 INJECTION, POWDER, LYOPHILIZED, FOR SOLUTION INTRAVENOUS at 17:01

## 2021-04-25 RX ADMIN — Medication 10 ML: at 23:00

## 2021-04-25 RX ADMIN — LORAZEPAM 1 MG: 1 TABLET ORAL at 21:52

## 2021-04-25 RX ADMIN — CLONIDINE HYDROCHLORIDE 0.1 MG: 0.1 TABLET ORAL at 13:20

## 2021-04-25 RX ADMIN — CLONIDINE HYDROCHLORIDE 0.1 MG: 0.1 TABLET ORAL at 22:59

## 2021-04-25 RX ADMIN — OXYCODONE HYDROCHLORIDE 5 MG: 5 TABLET ORAL at 23:31

## 2021-04-25 RX ADMIN — Medication 10 ML: at 08:01

## 2021-04-25 RX ADMIN — POTASSIUM CHLORIDE 10 MEQ: 7.46 INJECTION, SOLUTION INTRAVENOUS at 10:40

## 2021-04-25 RX ADMIN — CHLORDIAZEPOXIDE HYDROCHLORIDE 25 MG: 25 CAPSULE ORAL at 08:03

## 2021-04-25 RX ADMIN — SODIUM PHOSPHATE, MONOBASIC, MONOHYDRATE 20 MMOL: 276; 142 INJECTION, SOLUTION INTRAVENOUS at 14:26

## 2021-04-25 RX ADMIN — ACETAMINOPHEN 650 MG: 325 TABLET ORAL at 13:29

## 2021-04-25 RX ADMIN — SODIUM CHLORIDE: 9 INJECTION, SOLUTION INTRAVENOUS at 00:10

## 2021-04-25 RX ADMIN — POTASSIUM CHLORIDE 10 MEQ: 7.46 INJECTION, SOLUTION INTRAVENOUS at 12:16

## 2021-04-25 RX ADMIN — OXYCODONE HYDROCHLORIDE 5 MG: 5 TABLET ORAL at 06:02

## 2021-04-25 RX ADMIN — QUETIAPINE FUMARATE 200 MG: 200 TABLET ORAL at 23:00

## 2021-04-25 RX ADMIN — MAGNESIUM SULFATE HEPTAHYDRATE 1000 MG: 1 INJECTION, SOLUTION INTRAVENOUS at 13:21

## 2021-04-25 RX ADMIN — CLONIDINE HYDROCHLORIDE 0.1 MG: 0.1 TABLET ORAL at 08:03

## 2021-04-25 RX ADMIN — PIPERACILLIN SODIUM AND TAZOBACTAM SODIUM 3375 MG: 3; .375 INJECTION, POWDER, LYOPHILIZED, FOR SOLUTION INTRAVENOUS at 00:10

## 2021-04-25 RX ADMIN — OXYCODONE HYDROCHLORIDE 5 MG: 5 TABLET ORAL at 14:26

## 2021-04-25 RX ADMIN — SODIUM CHLORIDE: 9 INJECTION, SOLUTION INTRAVENOUS at 08:01

## 2021-04-25 RX ADMIN — QUETIAPINE FUMARATE 200 MG: 200 TABLET ORAL at 13:20

## 2021-04-25 RX ADMIN — OXYCODONE HYDROCHLORIDE 5 MG: 5 TABLET ORAL at 10:06

## 2021-04-25 RX ADMIN — OXYCODONE HYDROCHLORIDE 5 MG: 5 TABLET ORAL at 02:05

## 2021-04-25 RX ADMIN — MULTIPLE VITAMINS W/ MINERALS TAB 1 TABLET: TAB at 08:03

## 2021-04-25 RX ADMIN — PIPERACILLIN SODIUM AND TAZOBACTAM SODIUM 3375 MG: 3; .375 INJECTION, POWDER, LYOPHILIZED, FOR SOLUTION INTRAVENOUS at 08:03

## 2021-04-25 RX ADMIN — ACETAMINOPHEN 650 MG: 325 TABLET ORAL at 03:03

## 2021-04-25 RX ADMIN — POTASSIUM CHLORIDE 10 MEQ: 7.46 INJECTION, SOLUTION INTRAVENOUS at 09:34

## 2021-04-25 RX ADMIN — LORAZEPAM 1 MG: 1 TABLET ORAL at 02:49

## 2021-04-25 RX ADMIN — QUETIAPINE FUMARATE 200 MG: 200 TABLET ORAL at 08:03

## 2021-04-25 RX ADMIN — ACETAMINOPHEN 650 MG: 325 TABLET ORAL at 21:52

## 2021-04-25 RX ADMIN — CHLORDIAZEPOXIDE HYDROCHLORIDE 25 MG: 25 CAPSULE ORAL at 17:01

## 2021-04-25 RX ADMIN — POTASSIUM CHLORIDE 10 MEQ: 7.46 INJECTION, SOLUTION INTRAVENOUS at 13:17

## 2021-04-25 RX ADMIN — CHLORDIAZEPOXIDE HYDROCHLORIDE 25 MG: 25 CAPSULE ORAL at 23:00

## 2021-04-25 RX ADMIN — CHLORDIAZEPOXIDE HYDROCHLORIDE 25 MG: 25 CAPSULE ORAL at 13:20

## 2021-04-25 RX ADMIN — POTASSIUM CHLORIDE 10 MEQ: 7.46 INJECTION, SOLUTION INTRAVENOUS at 07:38

## 2021-04-25 RX ADMIN — OXYCODONE HYDROCHLORIDE 5 MG: 5 TABLET ORAL at 19:16

## 2021-04-25 RX ADMIN — POTASSIUM CHLORIDE 10 MEQ: 7.46 INJECTION, SOLUTION INTRAVENOUS at 08:36

## 2021-04-25 ASSESSMENT — PAIN DESCRIPTION - ORIENTATION
ORIENTATION: MID
ORIENTATION: LOWER
ORIENTATION: MID

## 2021-04-25 ASSESSMENT — PAIN DESCRIPTION - DESCRIPTORS
DESCRIPTORS: ACHING
DESCRIPTORS: ACHING

## 2021-04-25 ASSESSMENT — PAIN DESCRIPTION - FREQUENCY: FREQUENCY: CONTINUOUS

## 2021-04-25 ASSESSMENT — PAIN SCALES - GENERAL
PAINLEVEL_OUTOF10: 8
PAINLEVEL_OUTOF10: 0
PAINLEVEL_OUTOF10: 5
PAINLEVEL_OUTOF10: 8
PAINLEVEL_OUTOF10: 8
PAINLEVEL_OUTOF10: 7
PAINLEVEL_OUTOF10: 7

## 2021-04-25 ASSESSMENT — PAIN DESCRIPTION - ONSET: ONSET: ON-GOING

## 2021-04-25 ASSESSMENT — PAIN DESCRIPTION - PROGRESSION: CLINICAL_PROGRESSION: GRADUALLY WORSENING

## 2021-04-25 ASSESSMENT — PAIN DESCRIPTION - LOCATION
LOCATION: ABDOMEN

## 2021-04-25 ASSESSMENT — PAIN DESCRIPTION - PAIN TYPE
TYPE: ACUTE PAIN
TYPE: ACUTE PAIN

## 2021-04-25 NOTE — PROGRESS NOTES
Called and talked to radiology regarding [place] placement. Informed that MD needs to talk to IR MD. Chaka Lozano regarding having him talk to IR MD. Verbalized understanding.

## 2021-04-25 NOTE — PROGRESS NOTES
Resting in bed awake. No complaints or needs at present time. Am assessment complete. Alert and oriented. Potassium this am of 3.0. Started IV potassium replacement of 60 meq (10 meq at a time). Call light in reach. Patient is able to demonstrate the ability to move from a reclining position to an upright position within the recliner. Bedside Mobility Assessment Tool (BMAT):     Assessment Level 1- Sit and Shake    1. From a semi-reclined position, ask patient to sit up and rotate to a seated position at the side of the bed. Can use the bedrail. 2. Ask patient to reach out and grab your hand and shake making sure patient reaches across his/her midline. Pass- Patient is able to come to a seated position, maintain core strength. Maintains seated balance while reaching across midline. Move on to Assessment Level 2. Assessment Level 2- Stretch and Point   1. With patient in seated position at the side of the bed, have patient place both feet on the floor (or stool) with knees no higher than hips. 2. Ask patient to stretch one leg and straighten the knee, then bend the ankle/flex and point the toes. If appropriate, repeat with the other leg. Pass- Patient is able to demonstrate appropriate quad strength on intended weight bearing limb(s). Move onto Assessment Level 3. Assessment Level 3- Stand   1. Ask patient to elevate off the bed or chair (seated to standing) using an assistive device (cane, bedrail). 2. Patient should be able to raise buttocks off be and hold for a count of five. May repeat once. Pass- Patient maintains standing stability for at least 5 seconds, proceed to assessment level 4. Assessment Level 4- Walk   1. Ask patient to march in place at bedside. 2. Then ask patient to advance step and return each foot. Some medical conditions may render a patient from stepping backwards, use your best clinical judgement.    Pass- Patient demonstrates balance while shifting weight and ability to step, takes independent steps, does not use assistive device patient is MOBILITY LEVEL 4.       Mobility Level- 4

## 2021-04-25 NOTE — PROGRESS NOTES
Pt resting. Resp e/e. Shift assessment completed and charted. No needs. Will monitor.  Mary Pennington

## 2021-04-25 NOTE — PROGRESS NOTES
PROGRESS NOTE  S:45 yrs Patient  admitted on 4/20/2021 with Acute recurrent pancreatitis [K85.90] . Patient with continued pain    Current Hospital Schedued Meds   sodium phosphate IVPB  20 mmol Intravenous Once    magnesium sulfate  1,000 mg Intravenous Once    cloNIDine  0.1 mg Oral TID    sodium chloride flush  5-40 mL Intravenous 2 times per day    thiamine  100 mg Oral Daily    multivitamin  1 tablet Oral Daily    QUEtiapine  200 mg Oral TID    nicotine  1 patch Transdermal Daily    chlordiazePOXIDE  25 mg Oral 4x Daily    piperacillin-tazobactam  3,375 mg Intravenous Q8H     Current Hospital IV Meds   sodium chloride 25 mL (04/24/21 0059)    sodium chloride 150 mL/hr at 04/25/21 0801     Current Hospital PRN Meds  oxyCODONE, labetalol, sodium chloride flush, sodium chloride, LORazepam **OR** LORazepam **OR** LORazepam **OR** LORazepam **OR** LORazepam **OR** LORazepam **OR** LORazepam **OR** LORazepam, acetaminophen, promethazine **OR** ondansetron, potassium chloride **OR** potassium alternative oral replacement **OR** potassium chloride, magnesium sulfate    Exam:   Vitals:    04/25/21 0803   BP: (!) 149/93   Pulse:    Resp:    Temp:    SpO2:      I/O last 3 completed shifts: In: 5803 [P.O.:1140;  I.V.:3527; IV Piggyback:100]  Out: 1200 [Urine:1200]   General appearance: alert, appears stated age and cooperative  HEENT: PERRLA  Neck: no adenopathy, no carotid bruit, no JVD, supple, symmetrical, trachea midline and thyroid not enlarged, symmetric, no tenderness/mass/nodules  Lungs: clear to auscultation bilaterally  Heart: regular rate and rhythm, S1, S2 normal, no murmur, click, rub or gallop  Abdomen: soft, non-tender; bowel sounds normal; no masses,  no organomegaly  Extremities: extremities normal, atraumatic, no cyanosis or edema     Labs:  CBC:   Recent Labs     04/23/21  0534 04/24/21  0512 04/25/21  0536   WBC 7.5 6.1 6.2   HGB 12.5* 11.0* 9.5*   HCT

## 2021-04-25 NOTE — PROGRESS NOTES
Progress Note    Admit Date:  4/20/2021    Patient admitted with acute recurrent pancreatitis secondary to alcohol abuse. Subjective:  Mr. Jason Edwards complains of persistent abdominal pain . He has not tolerated advancing diet. GI is considering [place] placement. Nausea and vomiting has resolved. Tachycardia has resolved. Objective:   Patient Vitals for the past 4 hrs:   BP Temp Temp src Pulse Resp SpO2   04/25/21 0803 (!) 149/93 -- -- -- -- --   04/25/21 0749 135/88 98.7 °F (37.1 °C) Oral 98 16 90 %   04/25/21 0740 -- -- -- 108 -- --   04/25/21 0733 (!) 149/93 97.2 °F (36.2 °C) Oral 108 14 92 %            Intake/Output Summary (Last 24 hours) at 4/25/2021 1112  Last data filed at 4/25/2021 1009  Gross per 24 hour   Intake 5124 ml   Output 2425 ml   Net 2699 ml       Physical Exam:  General appearance:  No distress  Awake alert and oriented  HEENT:  Normal cephalic, atraumatic without obvious deformity. Conjunctivae/corneas clear. glasses  Neck: Supple,  Trachea midline. Respiratory:  Normal respiratory effort. Clear to auscultation, bilaterally without Rales/Wheezes/Rhonchi. On RA  Cardiovascular:   Regular rate and rhythm,  normal S1/S2 without murmurs, rubs or gallops. Abdomen: Diffusely tender, mildly distended, normal bowel sounds  Skin: Skin color, texture, turgor normal.  No rashes or lesions. Neurologic:  Neurovascularly intact without any focal sensory/motor deficits.  Cranial nerves: II-XII intact, grossly non-focal.  Psychiatric:  Alert and oriented, thought content appropriate, normal insight  Peripheral Pulses: +2 palpable, equal bilaterally         Scheduled Meds:   sodium phosphate IVPB  20 mmol Intravenous Once    magnesium sulfate  1,000 mg Intravenous Once    cloNIDine  0.1 mg Oral TID    sodium chloride flush  5-40 mL Intravenous 2 times per day    thiamine  100 mg Oral Daily    multivitamin  1 tablet Oral Daily    QUEtiapine  200 mg Oral TID    nicotine  1 patch Transdermal Daily    chlordiazePOXIDE  25 mg Oral 4x Daily    piperacillin-tazobactam  3,375 mg Intravenous Q8H       Continuous Infusions:   sodium chloride 25 mL (04/24/21 0059)    sodium chloride 150 mL/hr at 04/25/21 0801       PRN Meds:  oxyCODONE, labetalol, sodium chloride flush, sodium chloride, LORazepam **OR** LORazepam **OR** LORazepam **OR** LORazepam **OR** LORazepam **OR** LORazepam **OR** LORazepam **OR** LORazepam, acetaminophen, promethazine **OR** ondansetron, potassium chloride **OR** potassium alternative oral replacement **OR** potassium chloride, magnesium sulfate      Data:  CBC:   Recent Labs     04/23/21  0534 04/24/21  0512 04/25/21  0536   WBC 7.5 6.1 6.2   HGB 12.5* 11.0* 9.5*   HCT 35.9* 31.6* 27.8*   MCV 99.7 100.2* 102.2*   PLT 85* 94* 113*     BMP:   Recent Labs     04/23/21  0534 04/24/21  0512 04/25/21  0536   * 130* 132*   K 3.4* 3.4* 3.0*   CL 97* 95* 98*   CO2 24 22 18*   PHOS  --   --  2.0*   BUN 8 4* 4*   CREATININE 0.7* 0.6* 0.6*     LIVER PROFILE:   Recent Labs     04/23/21  0534 04/24/21  0512 04/25/21  0536   AST 53* 64* 58*   ALT 20 26 33   LIPASE 130.0*  --   --    BILITOT 1.2* 1.1* 0.8   ALKPHOS 62 70 74     CULTURES    SARS-COV-2 - Rapid: Not detected      RADIOLOGY    CT ABDOMEN PELVIS W IV CONTRAST Additional Contrast? None   Final Result   Acute pancreatitis with questionable developing necrosis in the pancreatic   tail. Fluid collection abutting the inferior aspect of the pancreatic head likely a   pseudocyst or abscess. Marked diffuse hepatic steatosis.            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 Seferino Knox  - Admitted

## 2021-04-26 ENCOUNTER — APPOINTMENT (OUTPATIENT)
Dept: INTERVENTIONAL RADIOLOGY/VASCULAR | Age: 46
DRG: 438 | End: 2021-04-26
Payer: MEDICARE

## 2021-04-26 LAB
A/G RATIO: 0.7 (ref 1.1–2.2)
ALBUMIN SERPL-MCNC: 2.3 G/DL (ref 3.4–5)
ALP BLD-CCNC: 90 U/L (ref 40–129)
ALT SERPL-CCNC: 40 U/L (ref 10–40)
ANION GAP SERPL CALCULATED.3IONS-SCNC: 13 MMOL/L (ref 3–16)
AST SERPL-CCNC: 54 U/L (ref 15–37)
BILIRUB SERPL-MCNC: 0.6 MG/DL (ref 0–1)
BUN BLDV-MCNC: <2 MG/DL (ref 7–20)
CALCIUM SERPL-MCNC: 8.3 MG/DL (ref 8.3–10.6)
CHLORIDE BLD-SCNC: 100 MMOL/L (ref 99–110)
CO2: 21 MMOL/L (ref 21–32)
CREAT SERPL-MCNC: <0.5 MG/DL (ref 0.9–1.3)
GFR AFRICAN AMERICAN: >60
GFR NON-AFRICAN AMERICAN: >60
GLOBULIN: 3.2 G/DL
GLUCOSE BLD-MCNC: 105 MG/DL (ref 70–99)
MAGNESIUM: 1.8 MG/DL (ref 1.8–2.4)
PHOSPHORUS: 1.7 MG/DL (ref 2.5–4.9)
POTASSIUM REFLEX MAGNESIUM: 3.2 MMOL/L (ref 3.5–5.1)
SODIUM BLD-SCNC: 134 MMOL/L (ref 136–145)
TOTAL PROTEIN: 5.5 G/DL (ref 6.4–8.2)

## 2021-04-26 PROCEDURE — 83735 ASSAY OF MAGNESIUM: CPT

## 2021-04-26 PROCEDURE — 1200000000 HC SEMI PRIVATE

## 2021-04-26 PROCEDURE — 43752 NASAL/OROGASTRIC W/TUBE PLMT: CPT

## 2021-04-26 PROCEDURE — 84100 ASSAY OF PHOSPHORUS: CPT

## 2021-04-26 PROCEDURE — 2580000003 HC RX 258: Performed by: INTERNAL MEDICINE

## 2021-04-26 PROCEDURE — 80053 COMPREHEN METABOLIC PANEL: CPT

## 2021-04-26 PROCEDURE — 6370000000 HC RX 637 (ALT 250 FOR IP): Performed by: INTERNAL MEDICINE

## 2021-04-26 PROCEDURE — 6370000000 HC RX 637 (ALT 250 FOR IP): Performed by: PHYSICIAN ASSISTANT

## 2021-04-26 PROCEDURE — 6360000002 HC RX W HCPCS: Performed by: INTERNAL MEDICINE

## 2021-04-26 PROCEDURE — 99232 SBSQ HOSP IP/OBS MODERATE 35: CPT | Performed by: INTERNAL MEDICINE

## 2021-04-26 PROCEDURE — 2500000003 HC RX 250 WO HCPCS: Performed by: INTERNAL MEDICINE

## 2021-04-26 PROCEDURE — 36415 COLL VENOUS BLD VENIPUNCTURE: CPT

## 2021-04-26 PROCEDURE — 2580000003 HC RX 258: Performed by: PHYSICIAN ASSISTANT

## 2021-04-26 PROCEDURE — 0DHA3UZ INSERTION OF FEEDING DEVICE INTO JEJUNUM, PERCUTANEOUS APPROACH: ICD-10-PCS | Performed by: RADIOLOGY

## 2021-04-26 PROCEDURE — BD16ZZZ FLUOROSCOPY OF UPPER GI AND SMALL BOWEL: ICD-10-PCS | Performed by: RADIOLOGY

## 2021-04-26 RX ADMIN — CHLORDIAZEPOXIDE HYDROCHLORIDE 25 MG: 25 CAPSULE ORAL at 13:03

## 2021-04-26 RX ADMIN — PIPERACILLIN SODIUM AND TAZOBACTAM SODIUM 3375 MG: 3; .375 INJECTION, POWDER, LYOPHILIZED, FOR SOLUTION INTRAVENOUS at 03:25

## 2021-04-26 RX ADMIN — OXYCODONE HYDROCHLORIDE 5 MG: 5 TABLET ORAL at 03:25

## 2021-04-26 RX ADMIN — LORAZEPAM 1 MG: 1 TABLET ORAL at 07:47

## 2021-04-26 RX ADMIN — Medication 10 ML: at 08:20

## 2021-04-26 RX ADMIN — QUETIAPINE FUMARATE 200 MG: 200 TABLET ORAL at 14:59

## 2021-04-26 RX ADMIN — CLONIDINE HYDROCHLORIDE 0.1 MG: 0.1 TABLET ORAL at 14:59

## 2021-04-26 RX ADMIN — PIPERACILLIN SODIUM AND TAZOBACTAM SODIUM 3375 MG: 3; .375 INJECTION, POWDER, LYOPHILIZED, FOR SOLUTION INTRAVENOUS at 18:25

## 2021-04-26 RX ADMIN — CHLORDIAZEPOXIDE HYDROCHLORIDE 25 MG: 25 CAPSULE ORAL at 08:19

## 2021-04-26 RX ADMIN — POTASSIUM PHOSPHATE, MONOBASIC AND POTASSIUM PHOSPHATE, DIBASIC 40 MMOL: 224; 236 INJECTION, SOLUTION, CONCENTRATE INTRAVENOUS at 13:04

## 2021-04-26 RX ADMIN — MULTIPLE VITAMINS W/ MINERALS TAB 1 TABLET: TAB at 08:19

## 2021-04-26 RX ADMIN — ONDANSETRON HYDROCHLORIDE 4 MG: 2 INJECTION, SOLUTION INTRAMUSCULAR; INTRAVENOUS at 18:53

## 2021-04-26 RX ADMIN — QUETIAPINE FUMARATE 200 MG: 200 TABLET ORAL at 20:34

## 2021-04-26 RX ADMIN — ACETAMINOPHEN 650 MG: 325 TABLET ORAL at 19:10

## 2021-04-26 RX ADMIN — CLONIDINE HYDROCHLORIDE 0.1 MG: 0.1 TABLET ORAL at 20:34

## 2021-04-26 RX ADMIN — Medication 100 MG: at 08:19

## 2021-04-26 RX ADMIN — OXYCODONE HYDROCHLORIDE 5 MG: 5 TABLET ORAL at 16:12

## 2021-04-26 RX ADMIN — CHLORDIAZEPOXIDE HYDROCHLORIDE 25 MG: 25 CAPSULE ORAL at 18:25

## 2021-04-26 RX ADMIN — OXYCODONE HYDROCHLORIDE 5 MG: 5 TABLET ORAL at 08:19

## 2021-04-26 RX ADMIN — PIPERACILLIN SODIUM AND TAZOBACTAM SODIUM 3375 MG: 3; .375 INJECTION, POWDER, LYOPHILIZED, FOR SOLUTION INTRAVENOUS at 09:00

## 2021-04-26 RX ADMIN — CHLORDIAZEPOXIDE HYDROCHLORIDE 25 MG: 25 CAPSULE ORAL at 20:34

## 2021-04-26 RX ADMIN — QUETIAPINE FUMARATE 200 MG: 200 TABLET ORAL at 08:19

## 2021-04-26 RX ADMIN — CLONIDINE HYDROCHLORIDE 0.1 MG: 0.1 TABLET ORAL at 08:19

## 2021-04-26 RX ADMIN — OXYCODONE HYDROCHLORIDE 5 MG: 5 TABLET ORAL at 12:09

## 2021-04-26 RX ADMIN — SODIUM CHLORIDE: 9 INJECTION, SOLUTION INTRAVENOUS at 12:09

## 2021-04-26 RX ADMIN — OXYCODONE HYDROCHLORIDE 5 MG: 5 TABLET ORAL at 20:34

## 2021-04-26 RX ADMIN — ONDANSETRON HYDROCHLORIDE 4 MG: 2 INJECTION, SOLUTION INTRAMUSCULAR; INTRAVENOUS at 08:24

## 2021-04-26 ASSESSMENT — PAIN SCALES - GENERAL
PAINLEVEL_OUTOF10: 8
PAINLEVEL_OUTOF10: 6
PAINLEVEL_OUTOF10: 9
PAINLEVEL_OUTOF10: 9

## 2021-04-26 ASSESSMENT — PAIN DESCRIPTION - ORIENTATION: ORIENTATION: LOWER

## 2021-04-26 ASSESSMENT — PAIN DESCRIPTION - LOCATION: LOCATION: ABDOMEN

## 2021-04-26 NOTE — PLAN OF CARE
Problem: Pain:  Description: Pain management should include both nonpharmacologic and pharmacologic interventions.   Goal: Pain level will decrease  Description: Pain level will decrease  4/26/2021 0316 by Pipe Coreas RN  Outcome: Ongoing  4/26/2021 0316 by Pipe Coreas RN  Outcome: Ongoing  Goal: Control of acute pain  Description: Control of acute pain  4/26/2021 0316 by Pipe Coreas RN  Outcome: Ongoing  4/26/2021 0316 by Pipe Coreas RN  Outcome: Ongoing  Goal: Control of chronic pain  Description: Control of chronic pain  4/26/2021 0316 by Pipe Coreas RN  Outcome: Ongoing  4/26/2021 0316 by Pipe Coreas RN  Outcome: Ongoing  Goal: Patient's pain/discomfort is manageable  Description: Patient's pain/discomfort is manageable  4/26/2021 0316 by Pipe Coreas RN  Outcome: Ongoing  4/26/2021 0316 by Pipe Coreas RN  Outcome: Ongoing     Problem: Infection:  Goal: Will remain free from infection  Description: Will remain free from infection  4/26/2021 0316 by Pipe Coreas RN  Outcome: Ongoing  4/26/2021 0316 by Pipe Coreas RN  Outcome: Ongoing     Problem: Safety:  Goal: Free from accidental physical injury  Description: Free from accidental physical injury  4/26/2021 0316 by Pipe Coreas RN  Outcome: Ongoing  4/26/2021 0316 by Pipe Coreas RN  Outcome: Ongoing  Goal: Free from intentional harm  Description: Free from intentional harm  4/26/2021 0316 by Pipe Coreas RN  Outcome: Ongoing  4/26/2021 0316 by Pipe Coreas RN  Outcome: Ongoing     Problem: Daily Care:  Goal: Daily care needs are met  Description: Daily care needs are met  4/26/2021 0316 by Pipe Coreas RN  Outcome: Ongoing  4/26/2021 0316 by Pipe Coreas RN  Outcome: Ongoing     Problem: Skin Integrity:  Goal: Skin integrity will stabilize  Description: Skin integrity will stabilize  4/26/2021 0316 by Pipe Coreas RN  Outcome: Ongoing  4/26/2021 0316 by Pipe Coreas RN  Outcome: Ongoing     Problem: Discharge Planning:  Goal: Patients continuum of care needs are met  Description: Patients continuum of care needs are met  4/26/2021 0316 by Gray Chavez, RN  Outcome: Ongoing  4/26/2021 0316 by Gray Chavez, RN  Outcome: Ongoing

## 2021-04-26 NOTE — PROGRESS NOTES
Patient called out asking for something to help with anxiety and withdrawal symptoms. CIWA currently 10. Gave PRN Ativan 1mg. Pt denies any other needs at this time. Call light within reach. Bed in low locked position.

## 2021-04-26 NOTE — PROGRESS NOTES
AM assessment completed, see flow sheet. Pt is alert and oriented. Vital signs are WNL. Respirations are even & easy on 3 L/O2 with SPO2 915. Pt complaints voiced of abdomen pain 9/10, medicated see MARGracie Aguillon Pt denies needs at this time. SR up x 2, and bed in low position. Call light is within reach.

## 2021-04-26 NOTE — PROGRESS NOTES
Handoff report and transfer of care given at bedside to [facility]. Patient in stable condition, denies needs/concerns at this time. Call light within reach.

## 2021-04-26 NOTE — PROGRESS NOTES
Shift assessment complete; see flow sheet. Scheduled medications administered; See MAR. IV infusing without difficulty. Pt c/o abdominal pain 10/10 PRN oxy given at this time. Pt denies any needs at this time.  Pt educated on use of call light and to call out with needs, verbalized understanding, bed in low locked position for pt safety

## 2021-04-26 NOTE — PLAN OF CARE
Nutrition Problem #1: Inadequate oral intake  Intervention: Food and/or Nutrient Delivery: Continue NPO  Nutritional Goals: pt will adhere to NPO status until medically cleared to receive nutrition therapy

## 2021-04-26 NOTE — PROGRESS NOTES
PROGRESS NOTE  S:45 yrs Patient  admitted on 4/20/2021 with Acute recurrent pancreatitis [K85.90] . Patient states wants some liquids. Was writhing in pain the other day    Current Hospital [employer] Meds   potassium phosphate IVPB  40 mmol Intravenous Once    cloNIDine  0.1 mg Oral TID    sodium chloride flush  5-40 mL Intravenous 2 times per day    thiamine  100 mg Oral Daily    multivitamin  1 tablet Oral Daily    QUEtiapine  200 mg Oral TID    nicotine  1 patch Transdermal Daily    chlordiazePOXIDE  25 mg Oral 4x Daily    piperacillin-tazobactam  3,375 mg Intravenous Q8H     Current Hospital IV Meds   sodium chloride 25 mL (04/24/21 0059)    sodium chloride 150 mL/hr at 04/25/21 1452     Current Hospital PRN Meds  oxyCODONE, labetalol, sodium chloride flush, sodium chloride, LORazepam **OR** LORazepam **OR** LORazepam **OR** LORazepam **OR** LORazepam **OR** LORazepam **OR** LORazepam **OR** LORazepam, acetaminophen, promethazine **OR** ondansetron, potassium chloride **OR** potassium alternative oral replacement **OR** potassium chloride, magnesium sulfate    Exam:   Vitals:    04/26/21 0816   BP: (!) 144/76   Pulse: 118   Resp: 16   Temp: 99.5 °F (37.5 °C)   SpO2: 91%     I/O last 3 completed shifts:   In: 2983 [P.O.:60; I.V.:1781; IV Piggyback:1142]  Out: 4728 [Urine:2225]   General appearance: alert, appears stated age and cooperative  HEENT: PERRLA  Neck: no adenopathy, no carotid bruit, no JVD, supple, symmetrical, trachea midline and thyroid not enlarged, symmetric, no tenderness/mass/nodules  Lungs: clear to auscultation bilaterally  Heart: regular rate and rhythm, S1, S2 normal, no murmur, click, rub or gallop  Abdomen: soft, non-tender; bowel sounds normal; no masses,  no organomegaly  Extremities: extremities normal, atraumatic, no cyanosis or edema     Labs:  CBC:   Recent Labs     04/24/21  0512 04/25/21  0536   WBC 6.1 6.2   HGB 11.0* 9.5*   HCT 31.6* 27.8*   .2* 102.2*   PLT 94* 113*     BMP:   Recent Labs     04/24/21  0512 04/25/21  0536 04/26/21  0546   * 132* 134*   K 3.4* 3.0* 3.2*   CL 95* 98* 100   CO2 22 18* 21   PHOS  --  2.0* 1.7*   BUN 4* 4* <2*   CREATININE 0.6* 0.6* <0.5*     LIVER PROFILE:   Recent Labs     04/24/21  0512 04/25/21  0536 04/26/21  0546   AST 64* 58* 54*   ALT 26 33 40   PROT 5.2* 4.8* 5.5*   BILITOT 1.1* 0.8 0.6   ALKPHOS 70 74 90     PT/INR: No results for input(s): INR in the last 72 hours. Invalid input(s): PT    IMAGING:  No results found. Hospital Problems           Last Modified POA    Alcohol abuse 4/22/2021 Yes    Acute recurrent pancreatitis 4/21/2021 Yes    Alcohol withdrawal syndrome with complication ([address] 6/42/3614 Yes    Hypokalemia 4/25/2021 Yes         Impression:  38 yo male with recurrent alcoholic pancreatitis    Recommendation:  1.  Plan NJ placement with IR given inability to advance diet      Satish Garcia DO  9:07 AM 4/26/2021            [address] [facility]    [address]      [address] [facility] [place]     Phone: [phone]     Fax: [phone]

## 2021-04-26 NOTE — PROGRESS NOTES
Comprehensive Nutrition Assessment    Type and Reason for Visit:  NPO/Clear Liquid, Initial(NPO/Clear liquid x 5 days admission)    Nutrition Recommendations/Plan:   1. Will monitor NPO status until medically cleared to receive nutrition therapy- will monitor MD notes for possible NJ placement  2. Monitor weight trends, bowel function, nutrition related lab values, +for s/s of GI distress  3. If po diet is advanced, monitor appetite, po intake, +diet advancement/tolerance    Nutrition Assessment:  pt is nutritionally compromised AEB NPO/Clear liquid x 5 days admission. At risk for further compromise d/t pt unable to tolerate PO diet r/t acute recurrent pancreatitis, +MD considering NJ placement, ETOH abuse +withdrawal, N/V PTA, +altered nutrition related lab values; Will continue NPO status +monitor MD notes for possible NJ placement    Malnutrition Assessment:  Malnutrition Status: At risk for malnutrition (Comment)    Context:  Acute Illness     Findings of the 6 clinical characteristics of malnutrition:  Energy Intake:  7 - 50% or less of estimated energy requirements for 5 or more days(NPO/Clear Liquid x 5 days admission)  Weight Loss:  No significant weight loss(# 11.2 oz (bed scale, 3 covers,1 pillow); most recent wt hx- pt weighed 148# 3.2 oz on 10/28/20 per past encounter)     Body Fat Loss:  Unable to assess(pt was asleep during time of assessment)     Muscle Mass Loss:  Unable to assess(pt was asleep during time of assessment)    Fluid Accumulation:  No significant fluid accumulation(noted)     Strength:  Not Performed    Estimated Daily Nutrient Needs:  Energy (kcal):  1285-6349 kcals per day based on 25-27 kcals/kg/CBW; Weight Used for Energy Requirements:  Current     Protein (g):  81-97 g protein per day based on 1-1.2 g/kg/CBW;  Weight Used for Protein Requirements:  Current        Fluid (ml/day):  1291-0929 ml/day based on 1 ml/kcal; Method Used for Fluid Requirements:  1 ml/kcal Nutrition Related Findings:  pt was asleep during time of assessment; NPO/clear liquid x 5 days admission r/t acute recurrent pancreatitis; ETOH abuse +withdrawal; noted pt IPTA + can prepare his own meals; noted abd round, soft, +BS active; noted last BM 4/24; noted pt had N/V PTA; noted pt was advanced to a clear liquid diet on 4/23 but was not able to tolerate; noted MD now considering NJ placement d/t pt unable to tolerate PO diet; BG elevated, h/h, phos, Cr, BUN, K and Na all low at this time; Wounds:  None       Current Nutrition Therapies:    Diet NPO Effective Now Exceptions are: Ice Chips, Sips of Water with Meds, Sips of Clear Liquids    Anthropometric Measures:  · Height: 6' (182.9 cm)  · Current Body Weight: 178 lb 11.2 oz (81.1 kg)(actual- obtained 4/26/21)   · Admission Body Weight: 178 lb 11.2 oz (81.1 kg)(actual-obtained 4/26 (bed scale))    · Usual Body Weight: 148 lb 3.2 oz (67.2 kg)(unsure of UBW; pt weighed 148# 3.2 oz on 10/28/20 per past encounter)     · Ideal Body Weight: 178 lbs; % Ideal Body Weight 100.4 %   · BMI: 24.2  · BMI Categories: Normal Weight (BMI 18.5-24. 9)       Nutrition Diagnosis:   · Inadequate oral intake related to altered GI function, altered GI structure, inadequate protein-energy intake as evidenced by NPO or clear liquid status due to medical condition, lab values, GI abnormality, nausea, vomiting    Nutrition Interventions:   Food and/or Nutrient Delivery:  Continue NPO  Nutrition Education/Counseling:  No recommendation at this time   Coordination of Nutrition Care:  Continue to monitor while inpatient    Goals:  pt will adhere to NPO status until medically cleared to receive nutrition therapy       Nutrition Monitoring and Evaluation:   Behavioral-Environmental Outcomes:  None Identified   Food/Nutrient Intake Outcomes:  Diet Advancement/Tolerance  Physical Signs/Symptoms Outcomes:  Biochemical Data, GI Status, Nausea or Vomiting, Nutrition Focused Physical Findings, Weight     Discharge Planning:     Too soon to determine     Electronically signed by Gil Ferrari RD, LD on 4/26/21 at 11:35 AM EDT    Contact: [phone]

## 2021-04-26 NOTE — PROGRESS NOTES
Report given @ bedside to [facility], for transferral of care. Pt resting in bed. Call light in reach.

## 2021-04-26 NOTE — FLOWSHEET NOTE
04/26/21 1209   Pain Assessment   Pain Level 8   Patient's Stated Pain Goal 4   Pain Type Acute pain   Pain Location Abdomen   Pain Descriptors Sharp; Stabbing   Pain Orientation Lower

## 2021-04-26 NOTE — CARE COORDINATION
290. Refill for 1 week. INTERDISCIPLINARY PLAN OF CARE CONFERENCE    Date/Time: 4/26/2021 9:28 AM  Completed by: Felicitas Tripp, Case Management      Patient Name:  Tae Jamison  YOB: 1975  Admitting Diagnosis: Acute recurrent pancreatitis [K85.90]     Admit Date/Time:  4/20/2021 10:47 PM    Chart reviewed. Interdisciplinary team contacted or reviewed plan related to patient progress and discharge plans. Disciplines included Case Management, Nursing, and Dietitian. Current Status: IP 04/21/2021  PT/OT recommendation for discharge plan of care: Need PT/OT order when appropriate. Expected D/C Disposition:  Home  Confirmed plan with patient (drowsy)  Discharge Plan Comments: Poor po due to ongoing pancreatitis, GI considering NJ tube for feeding. CIWA scores between 6-10 and now on librium and ativan. Will follow for possible HHC or placement needs.      Home O2 in place on admit: No  Pt informed of need to bring portable home O2 tank on day of discharge for nursing to connect prior to leaving:  Not Indicated  Verbalized agreement/Understanding:  Not Indicated

## 2021-04-26 NOTE — PROGRESS NOTES
Pt c/o nausea given zofran 4 mg IV. TF glucerna 1.2 started at 20 ml/hr with 30 ml H2O flush to right NJ. Pt given tylenol 650 mg po for fever 101.2. Call light in reach.

## 2021-04-26 NOTE — PROGRESS NOTES
Progress Note    Admit Date:  4/20/2021    Patient admitted with acute recurrent pancreatitis secondary to alcohol abuse. Subjective:  Mr. Sanjuanita Salamanca continues to appear very ill. He has not tolerated any diet. Abdomen remains tight and distended. He has persistent abdominal pain. .  Persistent fevers. Still tachycardic.   no nausea or vomiting. GI is considering NJ placement. Persistent hypoxia is on oxygen 3 L      Objective:   Patient Vitals for the past 4 hrs:   BP Temp Temp src Pulse Resp SpO2 Height   04/26/21 1026 -- -- -- -- -- -- 6' (1.829 m)   04/26/21 0816 (!) 144/76 99.5 °F (37.5 °C) Oral 118 16 91 % --   04/26/21 0740 (!) 147/88 -- -- 125 -- -- --            Intake/Output Summary (Last 24 hours) at 4/26/2021 1059  Last data filed at 4/25/2021 1826  Gross per 24 hour   Intake 2046 ml   Output 500 ml   Net 1546 ml       Physical Exam:  General appearance:  Mild distress  Awake alert and oriented  Appears ill  HEENT:  Normal cephalic, atraumatic without obvious deformity. Conjunctivae/corneas clear. glasses  Neck: Supple,  Trachea midline. Respiratory:  Normal respiratory effort. Clear to auscultation, bilaterally without Rales/Wheezes/Rhonchi. Cardiovascular:   Tachycardic,  normal S1/S2 without murmurs, rubs or gallops. Abdomen: Diffusely tender, mildly distended, normal bowel sounds  Skin: Skin color, texture, turgor normal.  No rashes or lesions. Neurologic:  Neurovascularly intact without any focal sensory/motor deficits.  Cranial nerves: II-XII intact, grossly non-focal.  Psychiatric:  Alert and oriented, thought content appropriate, normal insight  Peripheral Pulses: +2 palpable, equal bilaterally         Scheduled Meds:   potassium phosphate IVPB  40 mmol Intravenous Once    cloNIDine  0.1 mg Oral TID    sodium chloride flush  5-40 mL Intravenous 2 times per day    thiamine  100 mg Oral Daily    multivitamin  1 tablet Oral Daily    QUEtiapine  200 mg Oral TID    nicotine IVF.  -Patient with persistent fevers , continue IV Zosyn D#5  -Continue PRN Dilaudid and anti-emetics. Switched to oxycodone orally for pain control  - recommend abstinence from alcohol  - GI consulted  -He has persistent abdominal pain and did not tolerate initiation of clear liquid diet. Plan is for NJ tube placement today. N.p.o. continue IV fluids.      Leukocytosis  - 23.9 > 16.4 > 7.5  - likely 2/2 above  - trending down  Monitor white blood count    Hypokalemia Hypophosphatemia and hypomagnesemia  - replete    Hepatic Steatosis  Elevated LFTs  - noted on CT abdomen  - LFTs trending down  - encouraged weight loss and alcohol cessation  Monitor LFTs    Alcohol Abuse  Alcohol Withdrawal  - last drink was 4/20  - suspect pt is withdrawing, tachycardic on exam  - fall and seizure protocol  - cont [address] Librium, CIWA protocol    Tachycardia   - Cont clonidine  -  tachycardic likely from alcohol withdrawal syndrome      Hyponatremia   -monitor with IV hydration  Improved sodium is up to 134 today      DVT Prophylaxis: Lovenox  Diet: NPO  Code Status: Full Code       Plan NG placement today and then start tube feeds     Nina Schulz MD   4/26/2021

## 2021-04-27 ENCOUNTER — APPOINTMENT (OUTPATIENT)
Dept: INTERVENTIONAL RADIOLOGY/VASCULAR | Age: 46
DRG: 438 | End: 2021-04-27
Payer: MEDICARE

## 2021-04-27 ENCOUNTER — APPOINTMENT (OUTPATIENT)
Dept: CT IMAGING | Age: 46
DRG: 438 | End: 2021-04-27
Payer: MEDICARE

## 2021-04-27 LAB
A/G RATIO: 0.7 (ref 1.1–2.2)
ALBUMIN SERPL-MCNC: 2.3 G/DL (ref 3.4–5)
ALP BLD-CCNC: 94 U/L (ref 40–129)
ALT SERPL-CCNC: 50 U/L (ref 10–40)
ANION GAP SERPL CALCULATED.3IONS-SCNC: 17 MMOL/L (ref 3–16)
AST SERPL-CCNC: 76 U/L (ref 15–37)
BILIRUB SERPL-MCNC: 0.6 MG/DL (ref 0–1)
BUN BLDV-MCNC: <2 MG/DL (ref 7–20)
CALCIUM SERPL-MCNC: 8.3 MG/DL (ref 8.3–10.6)
CHLORIDE BLD-SCNC: 100 MMOL/L (ref 99–110)
CO2: 17 MMOL/L (ref 21–32)
CREAT SERPL-MCNC: <0.5 MG/DL (ref 0.9–1.3)
GFR AFRICAN AMERICAN: >60
GFR NON-AFRICAN AMERICAN: >60
GLOBULIN: 3.2 G/DL
GLUCOSE BLD-MCNC: 115 MG/DL (ref 70–99)
GLUCOSE BLD-MCNC: 82 MG/DL (ref 70–99)
GLUCOSE BLD-MCNC: 85 MG/DL (ref 70–99)
GLUCOSE BLD-MCNC: 91 MG/DL (ref 70–99)
GLUCOSE BLD-MCNC: 93 MG/DL (ref 70–99)
GLUCOSE BLD-MCNC: 94 MG/DL (ref 70–99)
MAGNESIUM: 1.7 MG/DL (ref 1.8–2.4)
PERFORMED ON: ABNORMAL
PERFORMED ON: NORMAL
POTASSIUM REFLEX MAGNESIUM: 3.1 MMOL/L (ref 3.5–5.1)
SODIUM BLD-SCNC: 134 MMOL/L (ref 136–145)
TOTAL PROTEIN: 5.5 G/DL (ref 6.4–8.2)

## 2021-04-27 PROCEDURE — 43752 NASAL/OROGASTRIC W/TUBE PLMT: CPT

## 2021-04-27 PROCEDURE — 6360000002 HC RX W HCPCS: Performed by: INTERNAL MEDICINE

## 2021-04-27 PROCEDURE — 87040 BLOOD CULTURE FOR BACTERIA: CPT

## 2021-04-27 PROCEDURE — 2709999900 IR GI TUBE W FLUOROSCOPY

## 2021-04-27 PROCEDURE — 2580000003 HC RX 258: Performed by: INTERNAL MEDICINE

## 2021-04-27 PROCEDURE — 6370000000 HC RX 637 (ALT 250 FOR IP): Performed by: INTERNAL MEDICINE

## 2021-04-27 PROCEDURE — 6370000000 HC RX 637 (ALT 250 FOR IP): Performed by: PHYSICIAN ASSISTANT

## 2021-04-27 PROCEDURE — 1200000000 HC SEMI PRIVATE

## 2021-04-27 PROCEDURE — 74177 CT ABD & PELVIS W/CONTRAST: CPT

## 2021-04-27 PROCEDURE — 83735 ASSAY OF MAGNESIUM: CPT

## 2021-04-27 PROCEDURE — 6360000004 HC RX CONTRAST MEDICATION: Performed by: INTERNAL MEDICINE

## 2021-04-27 PROCEDURE — 2580000003 HC RX 258: Performed by: PHYSICIAN ASSISTANT

## 2021-04-27 PROCEDURE — 99232 SBSQ HOSP IP/OBS MODERATE 35: CPT | Performed by: INTERNAL MEDICINE

## 2021-04-27 PROCEDURE — 36415 COLL VENOUS BLD VENIPUNCTURE: CPT

## 2021-04-27 PROCEDURE — 80053 COMPREHEN METABOLIC PANEL: CPT

## 2021-04-27 RX ORDER — MAGNESIUM SULFATE 1 G/100ML
1000 INJECTION INTRAVENOUS ONCE
Status: COMPLETED | OUTPATIENT
Start: 2021-04-27 | End: 2021-04-27

## 2021-04-27 RX ORDER — NICOTINE POLACRILEX 4 MG
15 LOZENGE BUCCAL PRN
Status: DISCONTINUED | OUTPATIENT
Start: 2021-04-27 | End: 2021-05-07 | Stop reason: HOSPADM

## 2021-04-27 RX ORDER — DEXTROSE MONOHYDRATE 25 G/50ML
12.5 INJECTION, SOLUTION INTRAVENOUS PRN
Status: DISCONTINUED | OUTPATIENT
Start: 2021-04-27 | End: 2021-05-07 | Stop reason: HOSPADM

## 2021-04-27 RX ORDER — OXYCODONE HYDROCHLORIDE 5 MG/1
10 TABLET ORAL 4 TIMES DAILY
Status: DISCONTINUED | OUTPATIENT
Start: 2021-04-27 | End: 2021-05-03

## 2021-04-27 RX ORDER — DEXTROSE MONOHYDRATE 50 MG/ML
100 INJECTION, SOLUTION INTRAVENOUS PRN
Status: DISCONTINUED | OUTPATIENT
Start: 2021-04-27 | End: 2021-05-07 | Stop reason: HOSPADM

## 2021-04-27 RX ORDER — PENTOXIFYLLINE 400 MG/1
400 TABLET, EXTENDED RELEASE ORAL
Status: DISCONTINUED | OUTPATIENT
Start: 2021-04-27 | End: 2021-05-07

## 2021-04-27 RX ADMIN — CHLORDIAZEPOXIDE HYDROCHLORIDE 25 MG: 25 CAPSULE ORAL at 17:05

## 2021-04-27 RX ADMIN — CLONIDINE HYDROCHLORIDE 0.1 MG: 0.1 TABLET ORAL at 20:06

## 2021-04-27 RX ADMIN — SODIUM CHLORIDE: 9 INJECTION, SOLUTION INTRAVENOUS at 08:51

## 2021-04-27 RX ADMIN — LORAZEPAM 1 MG: 1 TABLET ORAL at 03:41

## 2021-04-27 RX ADMIN — OXYCODONE HYDROCHLORIDE 5 MG: 5 TABLET ORAL at 00:43

## 2021-04-27 RX ADMIN — IOPAMIDOL 75 ML: 755 INJECTION, SOLUTION INTRAVENOUS at 14:17

## 2021-04-27 RX ADMIN — CHLORDIAZEPOXIDE HYDROCHLORIDE 25 MG: 25 CAPSULE ORAL at 20:06

## 2021-04-27 RX ADMIN — OXYCODONE HYDROCHLORIDE 5 MG: 5 TABLET ORAL at 08:44

## 2021-04-27 RX ADMIN — OXYCODONE HYDROCHLORIDE 5 MG: 5 TABLET ORAL at 04:32

## 2021-04-27 RX ADMIN — ONDANSETRON HYDROCHLORIDE 4 MG: 2 INJECTION, SOLUTION INTRAMUSCULAR; INTRAVENOUS at 16:05

## 2021-04-27 RX ADMIN — CLONIDINE HYDROCHLORIDE 0.1 MG: 0.1 TABLET ORAL at 14:58

## 2021-04-27 RX ADMIN — QUETIAPINE FUMARATE 200 MG: 200 TABLET ORAL at 14:58

## 2021-04-27 RX ADMIN — PIPERACILLIN SODIUM AND TAZOBACTAM SODIUM 3375 MG: 3; .375 INJECTION, POWDER, LYOPHILIZED, FOR SOLUTION INTRAVENOUS at 00:45

## 2021-04-27 RX ADMIN — QUETIAPINE FUMARATE 200 MG: 200 TABLET ORAL at 08:44

## 2021-04-27 RX ADMIN — PENTOXIFYLLINE 400 MG: 400 TABLET, FILM COATED, EXTENDED RELEASE ORAL at 18:38

## 2021-04-27 RX ADMIN — Medication 100 MG: at 08:44

## 2021-04-27 RX ADMIN — CHLORDIAZEPOXIDE HYDROCHLORIDE 25 MG: 25 CAPSULE ORAL at 12:51

## 2021-04-27 RX ADMIN — MULTIPLE VITAMINS W/ MINERALS TAB 1 TABLET: TAB at 08:44

## 2021-04-27 RX ADMIN — CHLORDIAZEPOXIDE HYDROCHLORIDE 25 MG: 25 CAPSULE ORAL at 08:44

## 2021-04-27 RX ADMIN — MEROPENEM 1000 MG: 1 INJECTION, POWDER, FOR SOLUTION INTRAVENOUS at 17:06

## 2021-04-27 RX ADMIN — SODIUM CHLORIDE: 9 INJECTION, SOLUTION INTRAVENOUS at 16:05

## 2021-04-27 RX ADMIN — CLONIDINE HYDROCHLORIDE 0.1 MG: 0.1 TABLET ORAL at 08:44

## 2021-04-27 RX ADMIN — POTASSIUM CHLORIDE 40 MEQ: 1500 TABLET, EXTENDED RELEASE ORAL at 08:44

## 2021-04-27 RX ADMIN — QUETIAPINE FUMARATE 200 MG: 200 TABLET ORAL at 20:06

## 2021-04-27 RX ADMIN — Medication 10 ML: at 08:44

## 2021-04-27 RX ADMIN — OXYCODONE HYDROCHLORIDE 10 MG: 5 TABLET ORAL at 20:06

## 2021-04-27 RX ADMIN — OXYCODONE HYDROCHLORIDE 5 MG: 5 TABLET ORAL at 12:53

## 2021-04-27 RX ADMIN — PIPERACILLIN SODIUM AND TAZOBACTAM SODIUM 3375 MG: 3; .375 INJECTION, POWDER, LYOPHILIZED, FOR SOLUTION INTRAVENOUS at 08:45

## 2021-04-27 RX ADMIN — OXYCODONE HYDROCHLORIDE 10 MG: 5 TABLET ORAL at 17:05

## 2021-04-27 RX ADMIN — MAGNESIUM SULFATE HEPTAHYDRATE 1000 MG: 1 INJECTION, SOLUTION INTRAVENOUS at 14:58

## 2021-04-27 RX ADMIN — SODIUM CHLORIDE: 9 INJECTION, SOLUTION INTRAVENOUS at 20:07

## 2021-04-27 ASSESSMENT — PAIN SCALES - GENERAL
PAINLEVEL_OUTOF10: 8
PAINLEVEL_OUTOF10: 7
PAINLEVEL_OUTOF10: 9
PAINLEVEL_OUTOF10: 9
PAINLEVEL_OUTOF10: 8

## 2021-04-27 ASSESSMENT — PAIN DESCRIPTION - LOCATION
LOCATION: ABDOMEN
LOCATION: ABDOMEN

## 2021-04-27 ASSESSMENT — PAIN DESCRIPTION - DESCRIPTORS: DESCRIPTORS: SHARP;STABBING

## 2021-04-27 ASSESSMENT — PAIN - FUNCTIONAL ASSESSMENT: PAIN_FUNCTIONAL_ASSESSMENT: ACTIVITIES ARE NOT PREVENTED

## 2021-04-27 ASSESSMENT — PAIN DESCRIPTION - FREQUENCY: FREQUENCY: CONTINUOUS

## 2021-04-27 ASSESSMENT — PAIN DESCRIPTION - ORIENTATION: ORIENTATION: LOWER

## 2021-04-27 ASSESSMENT — PAIN DESCRIPTION - ONSET: ONSET: ON-GOING

## 2021-04-27 NOTE — PLAN OF CARE
Problem: Pain:  Description: Pain management should include both nonpharmacologic and pharmacologic interventions.   Goal: Pain level will decrease  Description: Pain level will decrease  Outcome: Ongoing  Goal: Control of acute pain  Description: Control of acute pain  Outcome: Ongoing  Goal: Control of chronic pain  Description: Control of chronic pain  Outcome: Ongoing  Goal: Patient's pain/discomfort is manageable  Description: Patient's pain/discomfort is manageable  Outcome: Ongoing     Problem: Infection:  Goal: Will remain free from infection  Description: Will remain free from infection  Outcome: Ongoing     Problem: Safety:  Goal: Free from accidental physical injury  Description: Free from accidental physical injury  Outcome: Ongoing  Goal: Free from intentional harm  Description: Free from intentional harm  Outcome: Ongoing     Problem: Daily Care:  Goal: Daily care needs are met  Description: Daily care needs are met  Outcome: Ongoing     Problem: Skin Integrity:  Goal: Skin integrity will stabilize  Description: Skin integrity will stabilize  Outcome: Ongoing     Problem: Discharge Planning:  Goal: Patients continuum of care needs are met  Description: Patients continuum of care needs are met  Outcome: Ongoing     Problem: Nutrition  Goal: Optimal nutrition therapy  Outcome: Ongoing  Goal: Understanding of nutritional guidelines  Outcome: Ongoing

## 2021-04-27 NOTE — PROGRESS NOTES
Pt returned to unit from having NJ tube placed and CT abdomen. Call dietary for TF. Call light in reach.

## 2021-04-27 NOTE — PROGRESS NOTES
Patient called out asking for something to help with anxiety and withdrawal symptoms. CIWA currently 8. Gave PRN Ativan 1 mg. Pt denies any other needs at this time. Call light within reach. Bed in low locked position.

## 2021-04-27 NOTE — PROGRESS NOTES
Handoff report and transfer of care given at bedside to [facility]. Patient in stable condition, denies needs/concerns at this time. Call light within reach.

## 2021-04-27 NOTE — PROGRESS NOTES
Report given @ bedside to [facility], for transferral of care. Pt resting in bed. Call light in reach.

## 2021-04-27 NOTE — PROGRESS NOTES
AM assessment completed, see flow sheet. Pt is alert and oriented. Vital signs are WNL. Respirations are even & easy on 2 L/NC. KCL level 3.1 replaced per protocol, see MAR. Pt c/o abdomen pain 8/10, medicated see MAR. Pt denies needs at this time. SR up x 2, and bed in low position. Call light is within reach.

## 2021-04-27 NOTE — PROGRESS NOTES
Pt requesting pain medication for abdominal pain rating 7/10. PRN  pain medication given, see MAR. SR up x2, Call light and bedside table in easy reach. Denies any other needs at this time.

## 2021-04-27 NOTE — PROGRESS NOTES
Pt TF started @ 20 ml/hr with 30 ml H2O flush to NJ tube. Pt given zofran 4 mg IVP for nausea/comfort. Dr. Gustabo Hernandez updated that CT results available to review. Awaiting new orders. Call light in reach for pt needs.

## 2021-04-27 NOTE — PROGRESS NOTES
Progress Note    Admit Date:  4/20/2021    Patient admitted with acute recurrent pancreatitis secondary to alcohol abuse. Subjective:  Mr. Jaquelin Mcclelland continues to appear very ill. He has not tolerated any diet. Abdomen remains tight and distended. He has persistent abdominal pain. .  Persistent fevers. Still tachycardic. No nausea or vomiting. Persistent hypoxia is on oxygen 3 L    NJ placed 4/26.->  patient pulled it out accidentally through the night . Mara Simon Plan back to interventional radiology today for rpt [facility] placement . Patient otherwise doing the same ; persistent fevers T-max 101.9 °F .  abdomen diffusely tender. electrolyte  imbalance with low potassium, low mag and low phosphorus - replaced     continues to require supplemental oxygen 3 L. Objective:   Patient Vitals for the past 4 hrs:   BP Temp Temp src Pulse Resp SpO2   04/27/21 0830 (!) 142/90 98.9 °F (37.2 °C) Oral 116 14 93 %            Intake/Output Summary (Last 24 hours) at 4/27/2021 1224  Last data filed at 4/26/2021 1902  Gross per 24 hour   Intake 30 ml   Output 500 ml   Net -470 ml       Physical Exam:  General appearance:  No distress  Awake alert and oriented  Appears ill  HEENT:  Normal cephalic, atraumatic without obvious deformity. Conjunctivae/corneas clear. glasses  Neck: Supple,  Trachea midline. Respiratory:  Normal respiratory effort. Clear to auscultation, bilaterally without Rales/Wheezes/Rhonchi. Cardiovascular:   Tachycardic,  normal S1/S2 without murmurs, rubs or gallops. Abdomen: Diffusely tender, mildly distended, normal bowel sounds  Skin: Skin color, texture, turgor normal.  No rashes or lesions. Neurologic:  Neurovascularly intact without any focal sensory/motor deficits.  Cranial nerves: II-XII intact, grossly non-focal.  Psychiatric:  Alert and oriented, thought content appropriate, normal insight  Peripheral Pulses: +2 palpable, equal bilaterally         Scheduled Meds:   cloNIDine  0.1 mg Oral TID    sodium chloride flush  5-40 mL Intravenous 2 times per day    thiamine  100 mg Oral Daily    multivitamin  1 tablet Oral Daily    QUEtiapine  200 mg Oral TID    nicotine  1 patch Transdermal Daily    chlordiazePOXIDE  25 mg Oral 4x Daily    piperacillin-tazobactam  3,375 mg Intravenous Q8H       Continuous Infusions:   sodium chloride 25 mL (04/24/21 0059)    sodium chloride 150 mL/hr at 04/27/21 0851       PRN Meds:  oxyCODONE, labetalol, sodium chloride flush, sodium chloride, LORazepam **OR** LORazepam **OR** LORazepam **OR** LORazepam **OR** LORazepam **OR** LORazepam **OR** LORazepam **OR** LORazepam, acetaminophen, promethazine **OR** ondansetron, potassium chloride **OR** potassium alternative oral replacement **OR** potassium chloride, magnesium sulfate      Data:  CBC:   Recent Labs     04/25/21  0536   WBC 6.2   HGB 9.5*   HCT 27.8*   .2*   *     BMP:   Recent Labs     04/25/21  0536 04/26/21  0546 04/27/21  0553   * 134* 134*   K 3.0* 3.2* 3.1*   CL 98* 100 100   CO2 18* 21 17*   PHOS 2.0* 1.7*  --    BUN 4* <2* <2*   CREATININE 0.6* <0.5* <0.5*     LIVER PROFILE:   Recent Labs     04/25/21  0536 04/26/21  0546 04/27/21  0553   AST 58* 54* 76*   ALT 33 40 50*   BILITOT 0.8 0.6 0.6   ALKPHOS 74 90 94     CULTURES    SARS-COV-2 - Rapid: Not detected      RADIOLOGY    IR GI TUBE W FLUOROSCOPY   Final Result   Successful placement of a post pyloric feeding tube. The tube was secured to   the nose at the 95 cm natty         CT ABDOMEN PELVIS W IV CONTRAST Additional Contrast? None   Final Result   Acute pancreatitis with questionable developing necrosis in the pancreatic   tail. Fluid collection abutting the inferior aspect of the pancreatic head likely a   pseudocyst or abscess. Marked diffuse hepatic steatosis.          IR GI TUBE W FLUOROSCOPY    (Results Pending)           Assessment/Plan:    Acute Recurrent Alcoholic Pancreatitis with Poss Abscess  - CT showed acute pancreatitis with questionable developing necrosis of the pancreatic tail with fluid collection abutting the inferior aspect of the pancreatic head  - Lipase on admission: 1173  - s/p EUS with FNA on 8/20 which was neg for malignant cells with features suggestive of fat necrosis; f/w Dr. Janie Bustos  - Admitted to Med Surg  - NPO, aggressive IVF. - patient advised abstinence from alcohol  - GI consulted  - Patient with persistent fevers , continue IV Zosyn D#6  - was on dilaudid - Switched to oxycodone orally for pain control  -He has persistent abdominal pain and did not tolerate initiation of clear liquid diet. Plan is for NJ tube placement , and initiate nasoenteric tube feeds . Place NJ tube again today. N.p.o. , continue IV fluids.    He has persistent fevers- we will repeat a CT scan of the abdomen and pelvis    Leukocytosis  - 23.9 > 16.4 > 7.5  - likely 2/2 above  - trending down  Monitor white blood count    Hypokalemia Hypophosphatemia and hypomagnesemia  - replete    Hepatic Steatosis  Elevated LFTs  - noted on CT abdomen  - LFTs trending down  - encouraged weight loss and alcohol cessation  Monitor LFTs    Alcohol Abuse  Alcohol Withdrawal  - last drink was 4/20  - suspect pt is withdrawing, tachycardic on exam  - fall and seizure protocol  - cont [address] Librium, CIWA protocol    Tachycardia   - Cont clonidine  -  tachycardic likely from alcohol withdrawal syndrome    Hyponatremia   -monitor with IV hydration  Improved sodium is up to 134 today      DVT Prophylaxis: Lovenox  Diet: NPO  Code Status: Full Code       Plan NJ placement today and then start tube feeds   check CT abd      Spencer Ahumada, MD   4/27/2021

## 2021-04-27 NOTE — PROGRESS NOTES
PROGRESS NOTE  S:45 yrs Patient  admitted on 4/20/2021 with Acute recurrent pancreatitis [K85.90] . NJ tube fell out    Current Hospital Schedued Meds   cloNIDine  0.1 mg Oral TID    sodium chloride flush  5-40 mL Intravenous 2 times per day    thiamine  100 mg Oral Daily    multivitamin  1 tablet Oral Daily    QUEtiapine  200 mg Oral TID    nicotine  1 patch Transdermal Daily    chlordiazePOXIDE  25 mg Oral 4x Daily    piperacillin-tazobactam  3,375 mg Intravenous Q8H     Current Hospital IV Meds   dextrose      sodium chloride 25 mL (04/24/21 0059)    sodium chloride 150 mL/hr at 04/27/21 0851     Current Hospital PRN Meds  glucose, dextrose, glucagon (rDNA), dextrose, oxyCODONE, labetalol, sodium chloride flush, sodium chloride, LORazepam **OR** LORazepam **OR** LORazepam **OR** LORazepam **OR** LORazepam **OR** LORazepam **OR** LORazepam **OR** LORazepam, acetaminophen, promethazine **OR** ondansetron, potassium chloride **OR** potassium alternative oral replacement **OR** potassium chloride, magnesium sulfate    Exam:   Vitals:    04/27/21 1516   BP: (!) 148/93   Pulse: 106   Resp: 18   Temp: 98.8 °F (37.1 °C)   SpO2: 95%     I/O last 3 completed shifts:   In: 30 [NG/GT:30]  Out: 1350 [Urine:1350]   General appearance: alert, appears stated age and cooperative  HEENT: PERRLA  Neck: no adenopathy, no carotid bruit, no JVD, supple, symmetrical, trachea midline and thyroid not enlarged, symmetric, no tenderness/mass/nodules  Lungs: clear to auscultation bilaterally  Heart: regular rate and rhythm, S1, S2 normal, no murmur, click, rub or gallop  Abdomen: soft, non-tender; bowel sounds normal; no masses,  no organomegaly  Extremities: extremities normal, atraumatic, no cyanosis or edema     Labs:  CBC:   Recent Labs     04/25/21  0536   WBC 6.2   HGB 9.5*   HCT 27.8*   .2*   *     BMP:   Recent Labs     04/25/21  0536 04/26/21  0546 04/27/21  0553   NA 132* 134* 134*   K 3.0* 3.2* 3.1*   CL 98* 100 100   CO2 18* 21 17*   PHOS 2.0* 1.7*  --    BUN 4* <2* <2*   CREATININE 0.6* <0.5* <0.5*     LIVER PROFILE:   Recent Labs     04/25/21  0536 04/26/21  0546 04/27/21  0553   AST 58* 54* 76*   ALT 33 40 50*   PROT 4.8* 5.5* 5.5*   BILITOT 0.8 0.6 0.6   ALKPHOS 74 90 94     PT/INR: No results for input(s): INR in the last 72 hours. Invalid input(s): PT    IMAGING:  Ct Abdomen Pelvis W Iv Contrast Additional Contrast? None    Result Date: 4/27/2021  EXAMINATION: CT OF THE ABDOMEN AND PELVIS WITH CONTRAST 4/27/2021 2:02 pm TECHNIQUE: CT of the abdomen and pelvis was performed with the administration of intravenous contrast. Multiplanar reformatted images are provided for review. Dose modulation, iterative reconstruction, and/or weight based adjustment of the mA/kV was utilized to reduce the radiation dose to as low as reasonably achievable. COMPARISON: 04/21/2021 HISTORY: ORDERING SYSTEM PROVIDED HISTORY: pancreatitis , fevers TECHNOLOGIST PROVIDED HISTORY: Reason for exam:->pancreatitis , fevers Additional Contrast?->None Reason for Exam: pancreatitis / fevers Acuity: Acute Type of Exam: Initial FINDINGS: Lower Chest: Moderate right and small left pleural effusions now present. Moderate opacity present in the right lower lobe mild opacity in the left lower lobe presumed to be atelectasis. Heart size is normal. Organs: Mild diffuse low-density of the liver without worrisome focal lesion. Large amount of inflammation of the pancreas now present significantly increased from the prior study. No enhancement of the pancreatic tail present concerning for pancreatic necrosis. Mid body of the pancreas also not well opacified. Gallbladder is partially contracted with a thickened wall. Other abdominal organs appear normal. GI/Bowel: Feeding tube now in place with its tip in the proximal jejunum. Normal appendix and terminal ileum. Proximal half of the colon is fluid-filled. No bowel obstruction. Pelvis: Prostate is normal in size. Urinary bladder appears normal. Moderate pelvic ascites. No mass or adenopathy. Peritoneum/Retroperitoneum: Large phlegmon and inflammation about the pancreas and posterior stomach. No focal abscess. Large amount fluid in the left pericolic gutter extending inferiorly into the pelvis. 4 cm fluid collection with developing wall medial to the gallbladder. Large amount of fluid anterior and superior to the transverse colon. Small amount of perihepatic ascites. Very small amount of fluid in the hepato renal fossa. No free air. Bones/Soft Tissues: No acute abnormality. Severe acute pancreatitis which has significantly worsened since 04/21/2021. Focal areas of the pancreas which do not enhance and are worrisome for pancreatic necrosis. Suspected developing pseudocyst in the magalie hepatis. Large amount of inflammatory ascites in the upper abdomen and left pericolic gutter extending into the pelvis. Feeding tube in place. Ir Alexander Morfin W Fluoroscopy    Result Date: 4/27/2021  PROCEDURE: IR GI TUBE W FLUOROSCOPY MODERATE CONSCIOUS SEDATION 4/27/2021 HISTORY: ORDERING SYSTEM PROVIDED HISTORY: with bridle for pancreatitis TECHNOLOGIST PROVIDED HISTORY: Reason for exam:->with bridle for pancreatitis Reason for Exam: post pyloric feedings Acuity: Acute Type of Exam: Initial TECHNIQUE: Described below CONTRAST: 5 cc Omnipaque 300 SEDATION: None FLUOROSCOPY DOSE AND TYPE OR TIME AND EXPOSURES: Fluoro time: 5.2 minutes Cumulative air kerma: 60.32 mGy DESCRIPTION OF PROCEDURE: Informed consent was obtained after a detailed explanation of the procedure including risks, benefits, and alternatives. Universal protocol was observed. Lidocaine jelly was used at the tip of the weighted feeding tube. The feeding tube was placed through the right nostril with tip positioned in the proximal jejunum using fluoroscopic guidance.   Contrast was injected to confirm

## 2021-04-27 NOTE — PROGRESS NOTES
Shift assessment complete; see flow sheet. Scheduled medications administered; See MAR. IV infusing without difficulty. O2 3 LPM nc in place. Pt c/o abdominal pain 8/10 PRN oxycodone given at this time. Pt denies any needs at this time.  Pt educated on use of call light and to call out with needs, verbalized understanding, bed in low locked position for pt safety

## 2021-04-28 LAB
A/G RATIO: 0.7 (ref 1.1–2.2)
ALBUMIN SERPL-MCNC: 2.2 G/DL (ref 3.4–5)
ALP BLD-CCNC: 107 U/L (ref 40–129)
ALT SERPL-CCNC: 105 U/L (ref 10–40)
ANION GAP SERPL CALCULATED.3IONS-SCNC: 13 MMOL/L (ref 3–16)
AST SERPL-CCNC: 182 U/L (ref 15–37)
BILIRUB SERPL-MCNC: 0.5 MG/DL (ref 0–1)
BUN BLDV-MCNC: <2 MG/DL (ref 7–20)
CALCIUM SERPL-MCNC: 8 MG/DL (ref 8.3–10.6)
CHLORIDE BLD-SCNC: 100 MMOL/L (ref 99–110)
CO2: 21 MMOL/L (ref 21–32)
CREAT SERPL-MCNC: <0.5 MG/DL (ref 0.9–1.3)
GFR AFRICAN AMERICAN: >60
GFR NON-AFRICAN AMERICAN: >60
GLOBULIN: 3 G/DL
GLUCOSE BLD-MCNC: 105 MG/DL (ref 70–99)
GLUCOSE BLD-MCNC: 118 MG/DL (ref 70–99)
GLUCOSE BLD-MCNC: 125 MG/DL (ref 70–99)
GLUCOSE BLD-MCNC: 132 MG/DL (ref 70–99)
GLUCOSE BLD-MCNC: 133 MG/DL (ref 70–99)
GLUCOSE BLD-MCNC: 133 MG/DL (ref 70–99)
GLUCOSE BLD-MCNC: 134 MG/DL (ref 70–99)
HCT VFR BLD CALC: 25.7 % (ref 40.5–52.5)
HEMOGLOBIN: 8.7 G/DL (ref 13.5–17.5)
MAGNESIUM: 1.8 MG/DL (ref 1.8–2.4)
MCH RBC QN AUTO: 34.1 PG (ref 26–34)
MCHC RBC AUTO-ENTMCNC: 34 G/DL (ref 31–36)
MCV RBC AUTO: 100.1 FL (ref 80–100)
PDW BLD-RTO: 15.4 % (ref 12.4–15.4)
PERFORMED ON: ABNORMAL
PHOSPHORUS: 1.3 MG/DL (ref 2.5–4.9)
PLATELET # BLD: 307 K/UL (ref 135–450)
PMV BLD AUTO: 7.5 FL (ref 5–10.5)
POTASSIUM REFLEX MAGNESIUM: 3.1 MMOL/L (ref 3.5–5.1)
RBC # BLD: 2.56 M/UL (ref 4.2–5.9)
SODIUM BLD-SCNC: 134 MMOL/L (ref 136–145)
TOTAL PROTEIN: 5.2 G/DL (ref 6.4–8.2)
WBC # BLD: 8.2 K/UL (ref 4–11)

## 2021-04-28 PROCEDURE — 6370000000 HC RX 637 (ALT 250 FOR IP): Performed by: INTERNAL MEDICINE

## 2021-04-28 PROCEDURE — 80053 COMPREHEN METABOLIC PANEL: CPT

## 2021-04-28 PROCEDURE — 36415 COLL VENOUS BLD VENIPUNCTURE: CPT

## 2021-04-28 PROCEDURE — 99232 SBSQ HOSP IP/OBS MODERATE 35: CPT | Performed by: INTERNAL MEDICINE

## 2021-04-28 PROCEDURE — 1200000000 HC SEMI PRIVATE

## 2021-04-28 PROCEDURE — 84100 ASSAY OF PHOSPHORUS: CPT

## 2021-04-28 PROCEDURE — 85027 COMPLETE CBC AUTOMATED: CPT

## 2021-04-28 PROCEDURE — 6360000002 HC RX W HCPCS: Performed by: INTERNAL MEDICINE

## 2021-04-28 PROCEDURE — 6370000000 HC RX 637 (ALT 250 FOR IP): Performed by: PHYSICIAN ASSISTANT

## 2021-04-28 PROCEDURE — 83735 ASSAY OF MAGNESIUM: CPT

## 2021-04-28 PROCEDURE — 2500000003 HC RX 250 WO HCPCS: Performed by: INTERNAL MEDICINE

## 2021-04-28 PROCEDURE — 2580000003 HC RX 258: Performed by: INTERNAL MEDICINE

## 2021-04-28 RX ORDER — PANTOPRAZOLE SODIUM 40 MG/1
40 TABLET, DELAYED RELEASE ORAL
Status: DISCONTINUED | OUTPATIENT
Start: 2021-04-28 | End: 2021-05-07 | Stop reason: HOSPADM

## 2021-04-28 RX ADMIN — CHLORDIAZEPOXIDE HYDROCHLORIDE 25 MG: 25 CAPSULE ORAL at 21:01

## 2021-04-28 RX ADMIN — CHLORDIAZEPOXIDE HYDROCHLORIDE 25 MG: 25 CAPSULE ORAL at 13:09

## 2021-04-28 RX ADMIN — MEROPENEM 1000 MG: 1 INJECTION, POWDER, FOR SOLUTION INTRAVENOUS at 08:27

## 2021-04-28 RX ADMIN — QUETIAPINE FUMARATE 200 MG: 200 TABLET ORAL at 13:09

## 2021-04-28 RX ADMIN — CHLORDIAZEPOXIDE HYDROCHLORIDE 25 MG: 25 CAPSULE ORAL at 08:21

## 2021-04-28 RX ADMIN — ACETAMINOPHEN 650 MG: 325 TABLET ORAL at 05:13

## 2021-04-28 RX ADMIN — PANCRELIPASE 72000 UNITS: 24000; 76000; 120000 CAPSULE, DELAYED RELEASE PELLETS ORAL at 17:51

## 2021-04-28 RX ADMIN — MEROPENEM 1000 MG: 1 INJECTION, POWDER, FOR SOLUTION INTRAVENOUS at 00:35

## 2021-04-28 RX ADMIN — CLONIDINE HYDROCHLORIDE 0.1 MG: 0.1 TABLET ORAL at 21:01

## 2021-04-28 RX ADMIN — Medication 100 MG: at 08:22

## 2021-04-28 RX ADMIN — QUETIAPINE FUMARATE 200 MG: 200 TABLET ORAL at 08:22

## 2021-04-28 RX ADMIN — OXYCODONE HYDROCHLORIDE 5 MG: 5 TABLET ORAL at 00:35

## 2021-04-28 RX ADMIN — LORAZEPAM 1 MG: 1 TABLET ORAL at 04:31

## 2021-04-28 RX ADMIN — PANTOPRAZOLE SODIUM 40 MG: 40 TABLET, DELAYED RELEASE ORAL at 13:14

## 2021-04-28 RX ADMIN — OXYCODONE HYDROCHLORIDE 10 MG: 5 TABLET ORAL at 13:09

## 2021-04-28 RX ADMIN — MULTIPLE VITAMINS W/ MINERALS TAB 1 TABLET: TAB at 08:22

## 2021-04-28 RX ADMIN — POTASSIUM CHLORIDE 40 MEQ: 1500 TABLET, EXTENDED RELEASE ORAL at 08:22

## 2021-04-28 RX ADMIN — CHLORDIAZEPOXIDE HYDROCHLORIDE 25 MG: 25 CAPSULE ORAL at 17:14

## 2021-04-28 RX ADMIN — PENTOXIFYLLINE 400 MG: 400 TABLET, FILM COATED, EXTENDED RELEASE ORAL at 08:31

## 2021-04-28 RX ADMIN — CLONIDINE HYDROCHLORIDE 0.1 MG: 0.1 TABLET ORAL at 08:22

## 2021-04-28 RX ADMIN — QUETIAPINE FUMARATE 200 MG: 200 TABLET ORAL at 21:02

## 2021-04-28 RX ADMIN — ACETAMINOPHEN 650 MG: 325 TABLET ORAL at 13:14

## 2021-04-28 RX ADMIN — ONDANSETRON HYDROCHLORIDE 4 MG: 2 INJECTION, SOLUTION INTRAMUSCULAR; INTRAVENOUS at 21:02

## 2021-04-28 RX ADMIN — POTASSIUM PHOSPHATE, MONOBASIC AND POTASSIUM PHOSPHATE, DIBASIC 40 MMOL: 224; 236 INJECTION, SOLUTION, CONCENTRATE INTRAVENOUS at 13:09

## 2021-04-28 RX ADMIN — MEROPENEM 1000 MG: 1 INJECTION, POWDER, FOR SOLUTION INTRAVENOUS at 17:14

## 2021-04-28 RX ADMIN — CLONIDINE HYDROCHLORIDE 0.1 MG: 0.1 TABLET ORAL at 13:09

## 2021-04-28 RX ADMIN — PANTOPRAZOLE SODIUM 40 MG: 40 TABLET, DELAYED RELEASE ORAL at 17:14

## 2021-04-28 RX ADMIN — ONDANSETRON HYDROCHLORIDE 4 MG: 2 INJECTION, SOLUTION INTRAMUSCULAR; INTRAVENOUS at 15:31

## 2021-04-28 RX ADMIN — OXYCODONE HYDROCHLORIDE 10 MG: 5 TABLET ORAL at 17:14

## 2021-04-28 RX ADMIN — PENTOXIFYLLINE 400 MG: 400 TABLET, FILM COATED, EXTENDED RELEASE ORAL at 13:13

## 2021-04-28 RX ADMIN — ONDANSETRON HYDROCHLORIDE 4 MG: 2 INJECTION, SOLUTION INTRAMUSCULAR; INTRAVENOUS at 00:35

## 2021-04-28 RX ADMIN — PENTOXIFYLLINE 400 MG: 400 TABLET, FILM COATED, EXTENDED RELEASE ORAL at 17:52

## 2021-04-28 RX ADMIN — OXYCODONE HYDROCHLORIDE 10 MG: 5 TABLET ORAL at 21:02

## 2021-04-28 RX ADMIN — OXYCODONE HYDROCHLORIDE 5 MG: 5 TABLET ORAL at 04:31

## 2021-04-28 RX ADMIN — OXYCODONE HYDROCHLORIDE 10 MG: 5 TABLET ORAL at 08:21

## 2021-04-28 ASSESSMENT — PAIN SCALES - GENERAL
PAINLEVEL_OUTOF10: 10
PAINLEVEL_OUTOF10: 5
PAINLEVEL_OUTOF10: 0
PAINLEVEL_OUTOF10: 8
PAINLEVEL_OUTOF10: 8
PAINLEVEL_OUTOF10: 7

## 2021-04-28 NOTE — CARE COORDINATION
INTERDISCIPLINARY PLAN OF CARE CONFERENCE    Date/Time: 4/28/2021 10:34 AM  Completed by: Miguel Umana Case Management      Patient Name:  Samson Calhoun  YOB: 1975  Admitting Diagnosis: Acute recurrent pancreatitis [K85.90]     Admit Date/Time:  4/20/2021 10:47 PM    Chart reviewed. Interdisciplinary team contacted or reviewed plan related to patient progress and discharge plans. Disciplines included Case Management, Nursing, and Dietitian. Current Status: IP 04/21/2021  PT/OT recommendation for discharge plan of care: Need order when appropriate for DC recs/precert for SNF. Expected D/C Disposition:  TBD  Confirmed plan with patient   Met with: at bedside  Discharge Plan Comments: Chart reviewed. Met with pt at bedside and explained the role of the CM. Pt confirms that his father lives nearby and is supportive but cannot provide 24/7 assistance. +NJ feeding tube for undetermined length of need at this point. Pt will need PT/OT if pt requires SNF placement pending progress. +CM following.      Home O2 in place on admit: No

## 2021-04-28 NOTE — PROGRESS NOTES
Progress Note    Admit Date:  4/20/2021    Patient admitted with acute recurrent pancreatitis secondary to alcohol abuse. Subjective:  Mr. Alyson Duncan continues to appear very ill. He has not tolerated any diet. Abdomen remains tight and distended. He has persistent abdominal pain. .  Persistent fevers. Still tachycardic. No nausea or vomiting. Persistent hypoxia is on oxygen 3 L    NJ placed 4/26.->  patient pulled it out accidentally through the night . .  -> back to interventional radiology  On 4/27   rpt 610 [facility] placed. Patient  doing the same ; persistent fevers T-max 101.9 °F .abdomen diffusely tender. electrolyte  imbalance with low potassium, low mag and low phosphorus - replaced   continues to require supplemental oxygen 3 L.      4/28  He is looking a little better today. Abdominal pain controlled. NJ tube replaced by interventional radiology with arturo yesterday 4/27/2021. Started on tube feedings. Currently tube feedings advanced to 40 mL per hour and he is tolerating it well. Still with low-grade fevers, tachycardia. CT scan of abdomen done yesterday showed diffuse worsening pancreatitis; IV antibiotics were switched to Merrem    Objective:   Patient Vitals for the past 4 hrs:   BP Temp Temp src Pulse Resp SpO2   04/28/21 0808 (!) 148/85 98.9 °F (37.2 °C) Oral 112 18 95 %   04/28/21 0800 -- -- -- -- -- (!) 88 %            Intake/Output Summary (Last 24 hours) at 4/28/2021 1144  Last data filed at 4/27/2021 1612  Gross per 24 hour   Intake 230 ml   Output 1800 ml   Net -1570 ml       Physical Exam:  General appearance:  No distress  Awake alert and oriented  Appears ill  HEENT:  Normal cephalic, atraumatic without obvious deformity. Conjunctivae/corneas clear. NJ tube is in place  Neck: Supple,  Trachea midline. Respiratory:  Normal respiratory effort. Clear to auscultation, bilaterally without Rales/Wheezes/Rhonchi.    Cardiovascular:   Tachycardic,  normal S1/S2 without murmurs, rubs or gallops. Abdomen: Diffusely tender, mildly distended, normal bowel sounds  Skin: Skin color, texture, turgor normal.  No rashes or lesions. Neurologic:  Neurovascularly intact without any focal sensory/motor deficits.  Cranial nerves: II-XII intact, grossly non-focal.  Psychiatric:  Alert and oriented, thought content appropriate, normal insight  Peripheral Pulses: +2 palpable, equal bilaterally         Scheduled Meds:   potassium phosphate IVPB  40 mmol Intravenous Once    pantoprazole  40 mg Oral BID AC    meropenem  1,000 mg Intravenous Q8H    oxyCODONE  10 mg Oral 4x Daily    pentoxifylline  400 mg Oral TID WC    cloNIDine  0.1 mg Oral TID    sodium chloride flush  5-40 mL Intravenous 2 times per day    thiamine  100 mg Oral Daily    multivitamin  1 tablet Oral Daily    QUEtiapine  200 mg Oral TID    nicotine  1 patch Transdermal Daily    chlordiazePOXIDE  25 mg Oral 4x Daily       Continuous Infusions:   dextrose      sodium chloride 25 mL (04/24/21 0059)    sodium chloride 150 mL/hr at 04/27/21 2007       PRN Meds:  glucose, dextrose, glucagon (rDNA), dextrose, oxyCODONE, labetalol, sodium chloride flush, sodium chloride, LORazepam **OR** LORazepam **OR** LORazepam **OR** LORazepam **OR** LORazepam **OR** LORazepam **OR** LORazepam **OR** LORazepam, acetaminophen, promethazine **OR** ondansetron, potassium chloride **OR** potassium alternative oral replacement **OR** potassium chloride, magnesium sulfate      Data:  CBC:   Recent Labs     04/28/21  0520   WBC 8.2   HGB 8.7*   HCT 25.7*   .1*        BMP:   Recent Labs     04/26/21  0546 04/27/21  0553 04/28/21  0520   * 134* 134*   K 3.2* 3.1* 3.1*    100 100   CO2 21 17* 21   PHOS 1.7*  --  1.3*   BUN <2* <2* <2*   CREATININE <0.5* <0.5* <0.5*     LIVER PROFILE:   Recent Labs     04/26/21  0546 04/27/21  0553 04/28/21  0520   AST 54* 76* 182*   ALT 40 50* 105*   BILITOT 0.6 0.6 0.5   ALKPHOS 90 94 107 CULTURES    SARS-COV-2 - Rapid: Not detected      RADIOLOGY    CT ABDOMEN PELVIS W IV CONTRAST Additional Contrast? None   Final Result   Severe acute pancreatitis which has significantly worsened since 04/21/2021. Focal areas of the pancreas which do not enhance and are worrisome for   pancreatic necrosis. Suspected developing pseudocyst in the magalie hepatis. Large amount of inflammatory ascites in the upper abdomen and left pericolic   gutter extending into the pelvis. Feeding tube in place. IR GI TUBE W FLUOROSCOPY   Final Result   Successful placement of a post pyloric feeding tube. The tube was secured at   the 105 cm natty. IR GI TUBE W FLUOROSCOPY   Final Result   Successful placement of a post pyloric feeding tube. The tube was secured to   the nose at the 95 cm natty         CT ABDOMEN PELVIS W IV CONTRAST Additional Contrast? None   Final Result   Acute pancreatitis with questionable developing necrosis in the pancreatic   tail. Fluid collection abutting the inferior aspect of the pancreatic head likely a   pseudocyst or abscess. Marked diffuse hepatic steatosis. 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 Indy Ocasio  - Admitted to Med Surg  - NPO, aggressive IVF. - patient advised abstinence from alcohol  - GI consulted  - Patient with persistent fevers ,continue IV Zosyn D#6  - was on dilaudid - Switched to oxycodone orally for pain control  -He has persistent abdominal pain and did not tolerate initiation of clear liquid diet. NJ tube placed, and initiated on nasoenteric tube feeds . keep  N.p.o. , continue IV fluids. He has persistent fevers,  repeatedCT scan of the abdomen and pelvis on 4/27/2021>  shows worsening pancreatitis with necrosis. Antibiotics now switched to IV Merrem day #2. on tube feedings , advance slowly as tolerated. Leukocytosis  - 23.9 > 16.4 > 7.5  - likely 2/2 above  - trending down  Monitor white blood count    Hypokalemia, Hypophosphatemia and hypomagnesemia  - cont to replete aggressively     Hepatic Steatosis  Elevated LFTs  - noted on CT abdomen  - LFTs trending down, monitor  - encouraged  alcohol cessation    Alcohol Abuse  Alcohol Withdrawal  - last drink was 4/20  - suspect pt is withdrawing, tachycardic on exam  - fall and seizure protocol  - cont [facility], [facility] protocol    Tachycardia   - Cont clonidine  -  tachycardic likely from alcohol withdrawal syndrome. also related to pancreatitis    Hyponatremia   -monitor with IV hydration  Improved sodium is up to 134 today      DVT Prophylaxis: Lovenox  Diet: NPO  DIET TUBE FEED CONTINUOUS/CYCLIC NPO; Diabetic (Glucerna 1.2);  Nasoenteric; Continuous; 20; 85; 20; Exceptions are: [employer], Sips of Water with Meds, Sips of Clear Liquids    Code Status: Full Code          Jairo Steward MD   4/28/2021

## 2021-04-28 NOTE — PROGRESS NOTES
PROGRESS NOTE  S:45 yrs Patient  admitted on 4/20/2021 with Acute recurrent pancreatitis [K85.90] . Worsened pancreatitis on imaging, patient complains of GERD    Current Hospital Schedued Meds   potassium phosphate IVPB  40 mmol Intravenous Once    pantoprazole  40 mg Oral BID AC    meropenem  1,000 mg Intravenous Q8H    oxyCODONE  10 mg Oral 4x Daily    pentoxifylline  400 mg Oral TID WC    cloNIDine  0.1 mg Oral TID    sodium chloride flush  5-40 mL Intravenous 2 times per day    thiamine  100 mg Oral Daily    multivitamin  1 tablet Oral Daily    QUEtiapine  200 mg Oral TID    nicotine  1 patch Transdermal Daily    chlordiazePOXIDE  25 mg Oral 4x Daily     Current Hospital IV Meds   dextrose      sodium chloride 25 mL (04/24/21 0059)    sodium chloride 75 mL/hr at 04/28/21 1321     Current Hospital PRN Meds  glucose, dextrose, glucagon (rDNA), dextrose, oxyCODONE, labetalol, sodium chloride flush, sodium chloride, LORazepam **OR** LORazepam **OR** LORazepam **OR** LORazepam **OR** LORazepam **OR** LORazepam **OR** LORazepam **OR** LORazepam, acetaminophen, promethazine **OR** ondansetron, potassium chloride **OR** potassium alternative oral replacement **OR** potassium chloride, magnesium sulfate    Exam:   Vitals:    04/28/21 1508   BP:    Pulse:    Resp:    Temp: 98.7 °F (37.1 °C)   SpO2:      I/O last 3 completed shifts:   In: 30 [NG/GT:30]  Out: 1100 [Urine:1100]   General appearance: alert, appears stated age and cooperative  HEENT: PERRLA  Neck: no adenopathy, no carotid bruit, no JVD, supple, symmetrical, trachea midline and thyroid not enlarged, symmetric, no tenderness/mass/nodules  Lungs: clear to auscultation bilaterally  Heart: regular rate and rhythm, S1, S2 normal, no murmur, click, rub or gallop  Abdomen: soft, non-tender; bowel sounds normal; no masses,  no organomegaly  Extremities: extremities normal, atraumatic, no cyanosis or edema Labs:  CBC:   Recent Labs     04/28/21  0520   WBC 8.2   HGB 8.7*   HCT 25.7*   .1*        BMP:   Recent Labs     04/26/21  0546 04/27/21  0553 04/28/21  0520   * 134* 134*   K 3.2* 3.1* 3.1*    100 100   CO2 21 17* 21   PHOS 1.7*  --  1.3*   BUN <2* <2* <2*   CREATININE <0.5* <0.5* <0.5*     LIVER PROFILE:   Recent Labs     04/26/21  0546 04/27/21  0553 04/28/21  0520   AST 54* 76* 182*   ALT 40 50* 105*   PROT 5.5* 5.5* 5.2*   BILITOT 0.6 0.6 0.5   ALKPHOS 90 94 107     PT/INR: No results for input(s): INR in the last 72 hours. Invalid input(s): PT    IMAGING:  Ct Abdomen Pelvis W Iv Contrast Additional Contrast? None    Result Date: 4/27/2021  EXAMINATION: CT OF THE ABDOMEN AND PELVIS WITH CONTRAST 4/27/2021 2:02 pm TECHNIQUE: CT of the abdomen and pelvis was performed with the administration of intravenous contrast. Multiplanar reformatted images are provided for review. Dose modulation, iterative reconstruction, and/or weight based adjustment of the mA/kV was utilized to reduce the radiation dose to as low as reasonably achievable. COMPARISON: 04/21/2021 HISTORY: ORDERING SYSTEM PROVIDED HISTORY: pancreatitis , fevers TECHNOLOGIST PROVIDED HISTORY: Reason for exam:->pancreatitis , fevers Additional Contrast?->None Reason for Exam: pancreatitis / fevers Acuity: Acute Type of Exam: Initial FINDINGS: Lower Chest: Moderate right and small left pleural effusions now present. Moderate opacity present in the right lower lobe mild opacity in the left lower lobe presumed to be atelectasis. Heart size is normal. Organs: Mild diffuse low-density of the liver without worrisome focal lesion. Large amount of inflammation of the pancreas now present significantly increased from the prior study. No enhancement of the pancreatic tail present concerning for pancreatic necrosis. Mid body of the pancreas also not well opacified. Gallbladder is partially contracted with a thickened wall.   Other abdominal organs appear normal. GI/Bowel: Feeding tube now in place with its tip in the proximal jejunum. Normal appendix and terminal ileum. Proximal half of the colon is fluid-filled. No bowel obstruction. Pelvis: Prostate is normal in size. Urinary bladder appears normal. Moderate pelvic ascites. No mass or adenopathy. Peritoneum/Retroperitoneum: Large phlegmon and inflammation about the pancreas and posterior stomach. No focal abscess. Large amount fluid in the left pericolic gutter extending inferiorly into the pelvis. 4 cm fluid collection with developing wall medial to the gallbladder. Large amount of fluid anterior and superior to the transverse colon. Small amount of perihepatic ascites. Very small amount of fluid in the hepato renal fossa. No free air. Bones/Soft Tissues: No acute abnormality. Severe acute pancreatitis which has significantly worsened since 04/21/2021. Focal areas of the pancreas which do not enhance and are worrisome for pancreatic necrosis. Suspected developing pseudocyst in the magalie hepatis. Large amount of inflammatory ascites in the upper abdomen and left pericolic gutter extending into the pelvis. Feeding tube in place. Ir Nik Elisa W Fluoroscopy    Result Date: 4/27/2021  PROCEDURE: IR GI TUBE W FLUOROSCOPY MODERATE CONSCIOUS SEDATION 4/27/2021 HISTORY: ORDERING SYSTEM PROVIDED HISTORY: with bridle for pancreatitis TECHNOLOGIST PROVIDED HISTORY: Reason for exam:->with bridle for pancreatitis Reason for Exam: post pyloric feedings Acuity: Acute Type of Exam: Initial TECHNIQUE: Described below CONTRAST: 5 cc Omnipaque 300 SEDATION: None FLUOROSCOPY DOSE AND TYPE OR TIME AND EXPOSURES: Fluoro time: 5.2 minutes Cumulative air kerma: 60.32 mGy DESCRIPTION OF PROCEDURE: Informed consent was obtained after a detailed explanation of the procedure including risks, benefits, and alternatives. Universal protocol was observed.  Lidocaine jelly was used at the tip of the weighted feeding tube. The feeding tube was placed through the right nostril with tip positioned in the proximal jejunum using fluoroscopic guidance. Contrast was injected to confirm placement. There were no immediate complications. The feeding tube was secured. Estimated blood loss: None     Successful placement of a post pyloric feeding tube. The tube was secured at the 105 cm natty. Hospital Problems           Last Modified POA    Alcohol abuse 4/22/2021 Yes    Acute recurrent pancreatitis 4/21/2021 Yes    [facility] 8/86/7186 Yes    Hypokalemia 4/25/2021 Yes         Impression:  40 yo male with alcoholic pancreatitis, hypokalemia, hepatic steatosis    Recommendation:  1. Continue NJ feedings  2. Added pentoxifylline (limited data may benefit)  3.  Cont ppi and add pancreatic enzymes      Tony Robison DO  4:55 PM 4/28/2021            [facility]    [address]      [address] [place] [address]     Phone: [phone]     Fax: [phone]

## 2021-04-28 NOTE — PROGRESS NOTES
Shift assessment complete; see flow sheet. Scheduled medications administered; See MAR. IV infusing without difficulty. O2 3 LPM nc in place. Pt c/o abdominal pain 9/10 scheduled Oxycodone given at this time. Pt denies any needs at this time.  Pt educated on use of call light and to call out with needs, verbalized understanding, bed in low locked position for pt safety

## 2021-04-28 NOTE — PLAN OF CARE
Problem: Pain:  Goal: Pain level will decrease  Description: Pain level will decrease  4/28/2021 1043 by Joel Valentin RN  Outcome: Ongoing  Problem: Nutrition  Goal: Optimal nutrition therapy  4/28/2021 1043 by Joel Valentin RN  Outcome: Ongoing

## 2021-04-28 NOTE — PROGRESS NOTES

## 2021-04-28 NOTE — PROGRESS NOTES
Increased tube feed up by 10ml. Now getting 30ml per hour with flushes every 4. Pt tolerating well at this time.  Pt denies any needs and call light is within easy reach

## 2021-04-29 LAB
A/G RATIO: 0.8 (ref 1.1–2.2)
ALBUMIN SERPL-MCNC: 2.3 G/DL (ref 3.4–5)
ALP BLD-CCNC: 106 U/L (ref 40–129)
ALT SERPL-CCNC: 103 U/L (ref 10–40)
ANION GAP SERPL CALCULATED.3IONS-SCNC: 12 MMOL/L (ref 3–16)
AST SERPL-CCNC: 85 U/L (ref 15–37)
BILIRUB SERPL-MCNC: 0.4 MG/DL (ref 0–1)
BUN BLDV-MCNC: <2 MG/DL (ref 7–20)
CALCIUM SERPL-MCNC: 8.2 MG/DL (ref 8.3–10.6)
CHLORIDE BLD-SCNC: 100 MMOL/L (ref 99–110)
CO2: 23 MMOL/L (ref 21–32)
CREAT SERPL-MCNC: <0.5 MG/DL (ref 0.9–1.3)
GFR AFRICAN AMERICAN: >60
GFR NON-AFRICAN AMERICAN: >60
GLOBULIN: 3 G/DL
GLUCOSE BLD-MCNC: 104 MG/DL (ref 70–99)
GLUCOSE BLD-MCNC: 108 MG/DL (ref 70–99)
GLUCOSE BLD-MCNC: 110 MG/DL (ref 70–99)
GLUCOSE BLD-MCNC: 120 MG/DL (ref 70–99)
GLUCOSE BLD-MCNC: 126 MG/DL (ref 70–99)
GLUCOSE BLD-MCNC: 126 MG/DL (ref 70–99)
GLUCOSE BLD-MCNC: 129 MG/DL (ref 70–99)
HCT VFR BLD CALC: 24.4 % (ref 40.5–52.5)
HEMOGLOBIN: 8.5 G/DL (ref 13.5–17.5)
MAGNESIUM: 1.7 MG/DL (ref 1.8–2.4)
MCH RBC QN AUTO: 34.4 PG (ref 26–34)
MCHC RBC AUTO-ENTMCNC: 34.8 G/DL (ref 31–36)
MCV RBC AUTO: 98.9 FL (ref 80–100)
PDW BLD-RTO: 15.9 % (ref 12.4–15.4)
PERFORMED ON: ABNORMAL
PHOSPHORUS: 1.6 MG/DL (ref 2.5–4.9)
PLATELET # BLD: 368 K/UL (ref 135–450)
PMV BLD AUTO: 7.6 FL (ref 5–10.5)
POTASSIUM REFLEX MAGNESIUM: 3.1 MMOL/L (ref 3.5–5.1)
RBC # BLD: 2.47 M/UL (ref 4.2–5.9)
SODIUM BLD-SCNC: 135 MMOL/L (ref 136–145)
TOTAL PROTEIN: 5.3 G/DL (ref 6.4–8.2)
WBC # BLD: 10 K/UL (ref 4–11)

## 2021-04-29 PROCEDURE — 6360000002 HC RX W HCPCS: Performed by: INTERNAL MEDICINE

## 2021-04-29 PROCEDURE — 6370000000 HC RX 637 (ALT 250 FOR IP): Performed by: INTERNAL MEDICINE

## 2021-04-29 PROCEDURE — 1200000000 HC SEMI PRIVATE

## 2021-04-29 PROCEDURE — 99232 SBSQ HOSP IP/OBS MODERATE 35: CPT | Performed by: INTERNAL MEDICINE

## 2021-04-29 PROCEDURE — 85027 COMPLETE CBC AUTOMATED: CPT

## 2021-04-29 PROCEDURE — 84100 ASSAY OF PHOSPHORUS: CPT

## 2021-04-29 PROCEDURE — 80053 COMPREHEN METABOLIC PANEL: CPT

## 2021-04-29 PROCEDURE — 2580000003 HC RX 258: Performed by: PHYSICIAN ASSISTANT

## 2021-04-29 PROCEDURE — 2580000003 HC RX 258: Performed by: INTERNAL MEDICINE

## 2021-04-29 PROCEDURE — 36415 COLL VENOUS BLD VENIPUNCTURE: CPT

## 2021-04-29 PROCEDURE — 2500000003 HC RX 250 WO HCPCS: Performed by: INTERNAL MEDICINE

## 2021-04-29 PROCEDURE — 83735 ASSAY OF MAGNESIUM: CPT

## 2021-04-29 RX ORDER — MAGNESIUM SULFATE 1 G/100ML
1000 INJECTION INTRAVENOUS ONCE
Status: DISCONTINUED | OUTPATIENT
Start: 2021-04-29 | End: 2021-05-03

## 2021-04-29 RX ADMIN — POTASSIUM PHOSPHATE, MONOBASIC AND POTASSIUM PHOSPHATE, DIBASIC 40 MMOL: 224; 236 INJECTION, SOLUTION, CONCENTRATE INTRAVENOUS at 15:04

## 2021-04-29 RX ADMIN — PENTOXIFYLLINE 400 MG: 400 TABLET, FILM COATED, EXTENDED RELEASE ORAL at 17:22

## 2021-04-29 RX ADMIN — OXYCODONE HYDROCHLORIDE 10 MG: 5 TABLET ORAL at 20:45

## 2021-04-29 RX ADMIN — Medication 10 ML: at 20:46

## 2021-04-29 RX ADMIN — CLONIDINE HYDROCHLORIDE 0.1 MG: 0.1 TABLET ORAL at 20:44

## 2021-04-29 RX ADMIN — MEROPENEM 1000 MG: 1 INJECTION, POWDER, FOR SOLUTION INTRAVENOUS at 00:58

## 2021-04-29 RX ADMIN — PANCRELIPASE 72000 UNITS: 24000; 76000; 120000 CAPSULE, DELAYED RELEASE PELLETS ORAL at 12:09

## 2021-04-29 RX ADMIN — MULTIPLE VITAMINS W/ MINERALS TAB 1 TABLET: TAB at 08:41

## 2021-04-29 RX ADMIN — PENTOXIFYLLINE 400 MG: 400 TABLET, FILM COATED, EXTENDED RELEASE ORAL at 08:44

## 2021-04-29 RX ADMIN — OXYCODONE HYDROCHLORIDE 10 MG: 5 TABLET ORAL at 08:43

## 2021-04-29 RX ADMIN — CLONIDINE HYDROCHLORIDE 0.1 MG: 0.1 TABLET ORAL at 14:01

## 2021-04-29 RX ADMIN — PANTOPRAZOLE SODIUM 40 MG: 40 TABLET, DELAYED RELEASE ORAL at 05:18

## 2021-04-29 RX ADMIN — QUETIAPINE FUMARATE 200 MG: 200 TABLET ORAL at 20:45

## 2021-04-29 RX ADMIN — QUETIAPINE FUMARATE 200 MG: 200 TABLET ORAL at 08:41

## 2021-04-29 RX ADMIN — PANTOPRAZOLE SODIUM 40 MG: 40 TABLET, DELAYED RELEASE ORAL at 17:22

## 2021-04-29 RX ADMIN — PANCRELIPASE 72000 UNITS: 24000; 76000; 120000 CAPSULE, DELAYED RELEASE PELLETS ORAL at 17:23

## 2021-04-29 RX ADMIN — CHLORDIAZEPOXIDE HYDROCHLORIDE 25 MG: 25 CAPSULE ORAL at 08:41

## 2021-04-29 RX ADMIN — MEROPENEM 1000 MG: 1 INJECTION, POWDER, FOR SOLUTION INTRAVENOUS at 17:22

## 2021-04-29 RX ADMIN — CLONIDINE HYDROCHLORIDE 0.1 MG: 0.1 TABLET ORAL at 08:41

## 2021-04-29 RX ADMIN — QUETIAPINE FUMARATE 200 MG: 200 TABLET ORAL at 14:01

## 2021-04-29 RX ADMIN — SODIUM CHLORIDE: 9 INJECTION, SOLUTION INTRAVENOUS at 01:00

## 2021-04-29 RX ADMIN — CHLORDIAZEPOXIDE HYDROCHLORIDE 25 MG: 25 CAPSULE ORAL at 17:22

## 2021-04-29 RX ADMIN — PANCRELIPASE 72000 UNITS: 24000; 76000; 120000 CAPSULE, DELAYED RELEASE PELLETS ORAL at 08:44

## 2021-04-29 RX ADMIN — MEROPENEM 1000 MG: 1 INJECTION, POWDER, FOR SOLUTION INTRAVENOUS at 08:47

## 2021-04-29 RX ADMIN — OXYCODONE HYDROCHLORIDE 5 MG: 5 TABLET ORAL at 14:01

## 2021-04-29 RX ADMIN — CHLORDIAZEPOXIDE HYDROCHLORIDE 25 MG: 25 CAPSULE ORAL at 20:45

## 2021-04-29 RX ADMIN — OXYCODONE HYDROCHLORIDE 10 MG: 5 TABLET ORAL at 17:22

## 2021-04-29 RX ADMIN — OXYCODONE HYDROCHLORIDE 10 MG: 5 TABLET ORAL at 12:06

## 2021-04-29 RX ADMIN — PENTOXIFYLLINE 400 MG: 400 TABLET, FILM COATED, EXTENDED RELEASE ORAL at 12:09

## 2021-04-29 RX ADMIN — OXYCODONE HYDROCHLORIDE 5 MG: 5 TABLET ORAL at 05:18

## 2021-04-29 RX ADMIN — CHLORDIAZEPOXIDE HYDROCHLORIDE 25 MG: 25 CAPSULE ORAL at 12:06

## 2021-04-29 RX ADMIN — MAGNESIUM SULFATE HEPTAHYDRATE 2000 MG: 40 INJECTION, SOLUTION INTRAVENOUS at 09:36

## 2021-04-29 RX ADMIN — Medication 100 MG: at 08:41

## 2021-04-29 ASSESSMENT — PAIN DESCRIPTION - FREQUENCY
FREQUENCY: INTERMITTENT
FREQUENCY: INTERMITTENT

## 2021-04-29 ASSESSMENT — PAIN DESCRIPTION - PROGRESSION
CLINICAL_PROGRESSION: GRADUALLY WORSENING
CLINICAL_PROGRESSION: GRADUALLY WORSENING

## 2021-04-29 ASSESSMENT — PAIN DESCRIPTION - ONSET
ONSET: GRADUAL
ONSET: GRADUAL

## 2021-04-29 ASSESSMENT — PAIN SCALES - GENERAL
PAINLEVEL_OUTOF10: 9
PAINLEVEL_OUTOF10: 8
PAINLEVEL_OUTOF10: 9

## 2021-04-29 ASSESSMENT — PAIN - FUNCTIONAL ASSESSMENT: PAIN_FUNCTIONAL_ASSESSMENT: ACTIVITIES ARE NOT PREVENTED

## 2021-04-29 ASSESSMENT — PAIN DESCRIPTION - PAIN TYPE: TYPE: ACUTE PAIN

## 2021-04-29 ASSESSMENT — PAIN DESCRIPTION - LOCATION: LOCATION: ABDOMEN;NECK

## 2021-04-29 NOTE — PROGRESS NOTES
Pt medicated for pain 8/10 in abdomen and neck. Medicated with PRN Roxicodone. See MAR and pain flow sheet. No further assistance needed at this time.

## 2021-04-29 NOTE — PROGRESS NOTES
Progress Note    Admit Date:  4/20/2021    Patient admitted with acute recurrent pancreatitis secondary to alcohol abuse. He did not tolerate any advance in diet and hence made n.p.o.. Repeat CT scan done on 4/28/2021 shows worsening diffuse pancreatitis. Antibiotics changed to IV Merrem. He has had 2 attempts at [facility] tube placement which has been unsuccessful. Each time got pulled out accidentally at night. Had emesis again yesterday, and NJ tube came out      Subjective:  Mr. Alyson Duncan remains ill. Continues to have low-grade fevers. .  Persistent diffuse abdominal pain controlled on oral pain pills. Persistent low-grade fevers at 100 °F.   Persistent tachycardia , heart rate in the low 100s and gets up to 130 with minimal exertion. [place] episodes of vomiting yesterday      Objective:   /78   Pulse 106   Temp 98.4 °F (36.9 °C) (Oral)   Resp 18   Ht 6' (1.829 m)   Wt 178 lb 11.2 oz (81.1 kg)   SpO2 91%   BMI 24.24 kg/m²         Intake/Output Summary (Last 24 hours) at 4/29/2021 7415  Last data filed at 4/28/2021 2100  Gross per 24 hour   Intake 100 ml   Output 1000 ml   Net -900 ml       Physical Exam:  General appearance:  No distress  Awake alert and oriented  Appears ill  HEENT:  Normal cephalic, atraumatic without obvious deformity. Conjunctivae/corneas clear. Neck: Supple,  Trachea midline. Respiratory:  Normal respiratory effort. Clear to auscultation, bilaterally without Rales/Wheezes/Rhonchi. Cardiovascular:   Tachycardic,  normal S1/S2 without murmurs, rubs or gallops. Abdomen: mildly distended, mild diffuse tenderness present , normal bowel sounds  Skin: Skin color, texture, turgor normal.  No rashes or lesions. Neurologic:  Neurovascularly intact without any focal sensory/motor deficits.  Cranial nerves: II-XII intact, grossly non-focal.  Psychiatric:  Alert and oriented, thought content appropriate, normal insight  Peripheral Pulses: +2 palpable, equal bilaterally Scheduled Meds:   pantoprazole  40 mg Oral BID AC    lipase-protease-amylase  72,000 Units Oral TID WC    meropenem  1,000 mg Intravenous Q8H    oxyCODONE  10 mg Oral 4x Daily    pentoxifylline  400 mg Oral TID WC    cloNIDine  0.1 mg Oral TID    sodium chloride flush  5-40 mL Intravenous 2 times per day    thiamine  100 mg Oral Daily    multivitamin  1 tablet Oral Daily    QUEtiapine  200 mg Oral TID    nicotine  1 patch Transdermal Daily    chlordiazePOXIDE  25 mg Oral 4x Daily       Continuous Infusions:   dextrose      sodium chloride 25 mL (04/24/21 0059)    sodium chloride 75 mL/hr at 04/29/21 0100       PRN Meds:  glucose, dextrose, glucagon (rDNA), dextrose, oxyCODONE, labetalol, sodium chloride flush, sodium chloride, LORazepam **OR** LORazepam **OR** LORazepam **OR** LORazepam **OR** LORazepam **OR** LORazepam **OR** LORazepam **OR** LORazepam, acetaminophen, promethazine **OR** ondansetron, potassium chloride **OR** potassium alternative oral replacement **OR** potassium chloride, magnesium sulfate      Data:  CBC:   Recent Labs     04/28/21  0520 04/29/21  0544   WBC 8.2 10.0   HGB 8.7* 8.5*   HCT 25.7* 24.4*   .1* 98.9    368     BMP:   Recent Labs     04/27/21  0553 04/28/21  0520 04/29/21  0544   * 134* 135*   K 3.1* 3.1* 3.1*    100 100   CO2 17* 21 23   PHOS  --  1.3* 1.6*   BUN <2* <2* <2*   CREATININE <0.5* <0.5* <0.5*     LIVER PROFILE:   Recent Labs     04/27/21  0553 04/28/21  0520 04/29/21  0544   AST 76* 182* 85*   ALT 50* 105* 103*   BILITOT 0.6 0.5 0.4   ALKPHOS 94 107 106     CULTURES    SARS-COV-2 - Rapid: Not detected     Blood cx x2: NGTD       RADIOLOGY    CT ABDOMEN PELVIS W IV CONTRAST Additional Contrast? None   Final Result   Severe acute pancreatitis which has significantly worsened since 04/21/2021. Focal areas of the pancreas which do not enhance and are worrisome for   pancreatic necrosis.   Suspected developing pseudocyst in the magalie hepatis. Large amount of inflammatory ascites in the upper abdomen and left pericolic   gutter extending into the pelvis. Feeding tube in place. IR GI TUBE W FLUOROSCOPY   Final Result   Successful placement of a post pyloric feeding tube. The tube was secured at   the 105 cm natty. IR GI TUBE W FLUOROSCOPY   Final Result   Successful placement of a post pyloric feeding tube. The tube was secured to   the nose at the 95 cm natty         CT ABDOMEN PELVIS W IV CONTRAST Additional Contrast? None   Final Result   Acute pancreatitis with questionable developing necrosis in the pancreatic   tail. Fluid collection abutting the inferior aspect of the pancreatic head likely a   pseudocyst or abscess. Marked diffuse hepatic steatosis. 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 Ksenia Dean  - Admitted to Med Surg  - advised abstinence from alcohol  - NPO, aggressive IVF.  - GI consulted  - Pt w/ persistent fevers, was on IV Zosyn x 6 days  - was on dilaudid - Switched to oxycodone orally for pain control  - He had persistent abdominal pain and did not tolerate initiation of clear liquid diet.   - NJ tube placed twice . initiated on nasoenteric tube feeds for a day . > NJ tube came out again on 4/28 night .   - keep  N.p.o. , continue IV fluids. - has persistent fevers, repeated CT scan of the abdomen and pelvis on 4/27/2021 >  showed worsening pancreatitis with necrosis. - Antibiotics switched to IV Merrem day # 4 , continue.   - creon added      Leukocytosis - Resolved  - 23.9 > 16.4 > 7.5 > 10.0  - likely 2/2 above  - trended down    Hypokalemia, Hypophosphatemia and hypomagnesemia  - cont to replete aggressively    Acute Hypoxic Respiratory Failure  - no home O2  - 88% on RA, now on 3L   - supp O2, wean as tolerated    Hepatic Steatosis  Elevated LFTs  - noted on CT abdomen  - LFTs trending down, monitor  - encouraged  alcohol cessation    Alcohol Abuse  Alcohol Withdrawal  - last drink was 4/20  - fall and seizure protocol  - cont [address] Librium, CIWA protocol    Tachycardia   - Cont clonidine  - tachycardia likely from alcohol withdrawal syndrome & pancreatitis    Hyponatremia   - monitor with IV hydration  - Improved; sodium is up to 135 today      DVT Prophylaxis: Lovenox. looks like this was held for a few days due to thrombocytopenia, SCDs ordered but patient refused AC. Platelet counts have improved now , will resume Lovenox.   Diet: NPO  Code Status: Full Code      Olga Lidia Albright MD   4/29/2021

## 2021-04-29 NOTE — PROGRESS NOTES
Shift assessment complete - See flow sheet. Nightly medications given - See [facility]         Patient c/o pain at this time. Patient denies any further needs. Bed alarm is set for patient safety. Call light explained and in reach    Pt had a bout of emesis, and NJ tube came up from throat. Clinical called and told to remove as it will need to be replaced by radiology.

## 2021-04-29 NOTE — PROGRESS NOTES
Pt resting with eyes closed, respirs witnessed as e/e, no signs of distress. SR up x2, bed in lowest  position, wheels locked,bed alarm on, Call light and bedside table in easy reach.    Natali Lyman RN

## 2021-04-29 NOTE — PLAN OF CARE
Problem: Pain:  Goal: Pain level will decrease  Description: Pain level will decrease  4/28/2021 1043 by Triny Gil RN  Outcome: Ongoing     Problem: Safety:  Goal: Free from accidental physical injury  Description: Free from accidental physical injury  Outcome: Met This Shift     Problem: Skin Integrity:  Goal: Skin integrity will stabilize  Description: Skin integrity will stabilize  Outcome: Met This Shift     Problem: Nutrition  Goal: Optimal nutrition therapy  4/28/2021 2349 by Lucas Ramirez RN  Outcome: Met This Shift  4/28/2021 1043 by Triny Gil RN  Outcome: Ongoing

## 2021-04-29 NOTE — PLAN OF CARE
Problem: Pain:  Goal: Pain level will decrease  Description: Pain level will decrease  Outcome: Ongoing  Goal: Control of acute pain  Description: Control of acute pain  Outcome: Ongoing  Goal: Control of chronic pain  Description: Control of chronic pain  Outcome: Ongoing  Goal: Patient's pain/discomfort is manageable  Description: Patient's pain/discomfort is manageable  Outcome: Ongoing     Problem: Infection:  Goal: Will remain free from infection  Description: Will remain free from infection  Outcome: Ongoing     Problem: Safety:  Goal: Free from accidental physical injury  Description: Free from accidental physical injury  4/29/2021 1235 by Neri Hennessy  Outcome: Ongoing  4/28/2021 2349 by Josette Maxwell RN  Outcome: Met This Shift  Goal: Free from intentional harm  Description: Free from intentional harm  Outcome: Ongoing     Problem: Daily Care:  Goal: Daily care needs are met  Description: Daily care needs are met  Outcome: Ongoing     Problem: Skin Integrity:  Goal: Skin integrity will stabilize  Description: Skin integrity will stabilize  4/29/2021 1235 by Neri Hennessy  Outcome: Ongoing  4/28/2021 2349 by Josette Maxwell RN  Outcome: Met This Shift     Problem: Discharge Planning:  Goal: Patients continuum of care needs are met  Description: Patients continuum of care needs are met  Outcome: Ongoing     Problem: Nutrition  Goal: Optimal nutrition therapy  4/29/2021 1235 by Neri Hennessy  Outcome: Ongoing  4/28/2021 2349 by Josette Maxwell RN  Outcome: Met This Shift  Goal: Understanding of nutritional guidelines  Outcome: Ongoing

## 2021-04-30 ENCOUNTER — APPOINTMENT (OUTPATIENT)
Dept: GENERAL RADIOLOGY | Age: 46
DRG: 438 | End: 2021-04-30
Payer: MEDICARE

## 2021-04-30 PROBLEM — E44.0 MODERATE PROTEIN-CALORIE MALNUTRITION (HCC): Status: ACTIVE | Noted: 2021-04-30

## 2021-04-30 LAB
A/G RATIO: 0.7 (ref 1.1–2.2)
ALBUMIN SERPL-MCNC: 2.2 G/DL (ref 3.4–5)
ALP BLD-CCNC: 91 U/L (ref 40–129)
ALT SERPL-CCNC: 82 U/L (ref 10–40)
ANION GAP SERPL CALCULATED.3IONS-SCNC: 12 MMOL/L (ref 3–16)
ANISOCYTOSIS: ABNORMAL
AST SERPL-CCNC: 66 U/L (ref 15–37)
BANDED NEUTROPHILS RELATIVE PERCENT: 1 % (ref 0–7)
BASOPHILS ABSOLUTE: 0 K/UL (ref 0–0.2)
BASOPHILS RELATIVE PERCENT: 0 %
BILIRUB SERPL-MCNC: 0.3 MG/DL (ref 0–1)
BUN BLDV-MCNC: <2 MG/DL (ref 7–20)
CALCIUM SERPL-MCNC: 8.2 MG/DL (ref 8.3–10.6)
CHLORIDE BLD-SCNC: 98 MMOL/L (ref 99–110)
CO2: 25 MMOL/L (ref 21–32)
CREAT SERPL-MCNC: <0.5 MG/DL (ref 0.9–1.3)
EOSINOPHILS ABSOLUTE: 0 K/UL (ref 0–0.6)
EOSINOPHILS RELATIVE PERCENT: 0 %
FERRITIN: 1148 NG/ML (ref 30–400)
GFR AFRICAN AMERICAN: >60
GFR NON-AFRICAN AMERICAN: >60
GLOBULIN: 3.1 G/DL
GLUCOSE BLD-MCNC: 104 MG/DL (ref 70–99)
GLUCOSE BLD-MCNC: 82 MG/DL (ref 70–99)
GLUCOSE BLD-MCNC: 94 MG/DL (ref 70–99)
GLUCOSE BLD-MCNC: 98 MG/DL (ref 70–99)
GLUCOSE BLD-MCNC: 99 MG/DL (ref 70–99)
HCT VFR BLD CALC: 23.8 % (ref 40.5–52.5)
HEMOGLOBIN: 8.1 G/DL (ref 13.5–17.5)
IRON SATURATION: 12 % (ref 20–50)
IRON: 14 UG/DL (ref 59–158)
LYMPHOCYTES ABSOLUTE: 0.9 K/UL (ref 1–5.1)
LYMPHOCYTES RELATIVE PERCENT: 8 %
MAGNESIUM: 1.9 MG/DL (ref 1.8–2.4)
MCH RBC QN AUTO: 33.7 PG (ref 26–34)
MCHC RBC AUTO-ENTMCNC: 34 G/DL (ref 31–36)
MCV RBC AUTO: 99.2 FL (ref 80–100)
MONOCYTES ABSOLUTE: 0.4 K/UL (ref 0–1.3)
MONOCYTES RELATIVE PERCENT: 4 %
NEUTROPHILS ABSOLUTE: 9.6 K/UL (ref 1.7–7.7)
NEUTROPHILS RELATIVE PERCENT: 87 %
PDW BLD-RTO: 16.2 % (ref 12.4–15.4)
PERFORMED ON: NORMAL
PHOSPHORUS: 1.9 MG/DL (ref 2.5–4.9)
PLATELET # BLD: 438 K/UL (ref 135–450)
PLATELET SLIDE REVIEW: ADEQUATE
PMV BLD AUTO: 7.6 FL (ref 5–10.5)
POTASSIUM REFLEX MAGNESIUM: 3.5 MMOL/L (ref 3.5–5.1)
RBC # BLD: 2.4 M/UL (ref 4.2–5.9)
SLIDE REVIEW: ABNORMAL
SODIUM BLD-SCNC: 135 MMOL/L (ref 136–145)
TOTAL IRON BINDING CAPACITY: 117 UG/DL (ref 260–445)
TOTAL PROTEIN: 5.3 G/DL (ref 6.4–8.2)
WBC # BLD: 10.9 K/UL (ref 4–11)

## 2021-04-30 PROCEDURE — 84100 ASSAY OF PHOSPHORUS: CPT

## 2021-04-30 PROCEDURE — 82728 ASSAY OF FERRITIN: CPT

## 2021-04-30 PROCEDURE — 6370000000 HC RX 637 (ALT 250 FOR IP): Performed by: INTERNAL MEDICINE

## 2021-04-30 PROCEDURE — 36415 COLL VENOUS BLD VENIPUNCTURE: CPT

## 2021-04-30 PROCEDURE — 2580000003 HC RX 258: Performed by: PHYSICIAN ASSISTANT

## 2021-04-30 PROCEDURE — 2700000000 HC OXYGEN THERAPY PER DAY

## 2021-04-30 PROCEDURE — 6360000002 HC RX W HCPCS: Performed by: INTERNAL MEDICINE

## 2021-04-30 PROCEDURE — 94761 N-INVAS EAR/PLS OXIMETRY MLT: CPT

## 2021-04-30 PROCEDURE — 83735 ASSAY OF MAGNESIUM: CPT

## 2021-04-30 PROCEDURE — 99233 SBSQ HOSP IP/OBS HIGH 50: CPT | Performed by: INTERNAL MEDICINE

## 2021-04-30 PROCEDURE — 80053 COMPREHEN METABOLIC PANEL: CPT

## 2021-04-30 PROCEDURE — 1200000000 HC SEMI PRIVATE

## 2021-04-30 PROCEDURE — 2580000003 HC RX 258: Performed by: INTERNAL MEDICINE

## 2021-04-30 PROCEDURE — 83550 IRON BINDING TEST: CPT

## 2021-04-30 PROCEDURE — 85007 BL SMEAR W/DIFF WBC COUNT: CPT

## 2021-04-30 PROCEDURE — 71046 X-RAY EXAM CHEST 2 VIEWS: CPT

## 2021-04-30 PROCEDURE — 83540 ASSAY OF IRON: CPT

## 2021-04-30 PROCEDURE — 85027 COMPLETE CBC AUTOMATED: CPT

## 2021-04-30 RX ADMIN — OXYCODONE HYDROCHLORIDE 10 MG: 5 TABLET ORAL at 08:11

## 2021-04-30 RX ADMIN — OXYCODONE HYDROCHLORIDE 10 MG: 5 TABLET ORAL at 16:38

## 2021-04-30 RX ADMIN — PENTOXIFYLLINE 400 MG: 400 TABLET, FILM COATED, EXTENDED RELEASE ORAL at 11:49

## 2021-04-30 RX ADMIN — QUETIAPINE FUMARATE 200 MG: 200 TABLET ORAL at 13:10

## 2021-04-30 RX ADMIN — CHLORDIAZEPOXIDE HYDROCHLORIDE 25 MG: 25 CAPSULE ORAL at 20:22

## 2021-04-30 RX ADMIN — CLONIDINE HYDROCHLORIDE 0.1 MG: 0.1 TABLET ORAL at 20:22

## 2021-04-30 RX ADMIN — CHLORDIAZEPOXIDE HYDROCHLORIDE 25 MG: 25 CAPSULE ORAL at 16:38

## 2021-04-30 RX ADMIN — OXYCODONE HYDROCHLORIDE 10 MG: 5 TABLET ORAL at 13:10

## 2021-04-30 RX ADMIN — PANTOPRAZOLE SODIUM 40 MG: 40 TABLET, DELAYED RELEASE ORAL at 16:38

## 2021-04-30 RX ADMIN — OXYCODONE HYDROCHLORIDE 10 MG: 5 TABLET ORAL at 20:23

## 2021-04-30 RX ADMIN — PENTOXIFYLLINE 400 MG: 400 TABLET, FILM COATED, EXTENDED RELEASE ORAL at 16:38

## 2021-04-30 RX ADMIN — PANCRELIPASE 72000 UNITS: 24000; 76000; 120000 CAPSULE, DELAYED RELEASE PELLETS ORAL at 16:38

## 2021-04-30 RX ADMIN — QUETIAPINE FUMARATE 200 MG: 200 TABLET ORAL at 20:22

## 2021-04-30 RX ADMIN — MEROPENEM 1000 MG: 1 INJECTION, POWDER, FOR SOLUTION INTRAVENOUS at 01:09

## 2021-04-30 RX ADMIN — MEROPENEM 1000 MG: 1 INJECTION, POWDER, FOR SOLUTION INTRAVENOUS at 16:39

## 2021-04-30 RX ADMIN — CHLORDIAZEPOXIDE HYDROCHLORIDE 25 MG: 25 CAPSULE ORAL at 13:10

## 2021-04-30 RX ADMIN — PANCRELIPASE 72000 UNITS: 24000; 76000; 120000 CAPSULE, DELAYED RELEASE PELLETS ORAL at 08:08

## 2021-04-30 RX ADMIN — CHLORDIAZEPOXIDE HYDROCHLORIDE 25 MG: 25 CAPSULE ORAL at 08:10

## 2021-04-30 RX ADMIN — PANCRELIPASE 72000 UNITS: 24000; 76000; 120000 CAPSULE, DELAYED RELEASE PELLETS ORAL at 11:49

## 2021-04-30 RX ADMIN — SODIUM CHLORIDE: 9 INJECTION, SOLUTION INTRAVENOUS at 20:23

## 2021-04-30 RX ADMIN — PANTOPRAZOLE SODIUM 40 MG: 40 TABLET, DELAYED RELEASE ORAL at 06:00

## 2021-04-30 RX ADMIN — PENTOXIFYLLINE 400 MG: 400 TABLET, FILM COATED, EXTENDED RELEASE ORAL at 08:08

## 2021-04-30 RX ADMIN — OXYCODONE HYDROCHLORIDE 5 MG: 5 TABLET ORAL at 02:11

## 2021-04-30 RX ADMIN — CLONIDINE HYDROCHLORIDE 0.1 MG: 0.1 TABLET ORAL at 08:10

## 2021-04-30 RX ADMIN — Medication 100 MG: at 08:10

## 2021-04-30 RX ADMIN — CLONIDINE HYDROCHLORIDE 0.1 MG: 0.1 TABLET ORAL at 13:10

## 2021-04-30 RX ADMIN — MULTIPLE VITAMINS W/ MINERALS TAB 1 TABLET: TAB at 08:10

## 2021-04-30 RX ADMIN — ACETAMINOPHEN 650 MG: 325 TABLET ORAL at 20:27

## 2021-04-30 RX ADMIN — SODIUM CHLORIDE: 9 INJECTION, SOLUTION INTRAVENOUS at 08:10

## 2021-04-30 RX ADMIN — MEROPENEM 1000 MG: 1 INJECTION, POWDER, FOR SOLUTION INTRAVENOUS at 08:09

## 2021-04-30 RX ADMIN — Medication 10 ML: at 08:12

## 2021-04-30 RX ADMIN — QUETIAPINE FUMARATE 200 MG: 200 TABLET ORAL at 08:10

## 2021-04-30 ASSESSMENT — PAIN SCALES - GENERAL
PAINLEVEL_OUTOF10: 8
PAINLEVEL_OUTOF10: 9

## 2021-04-30 ASSESSMENT — PAIN DESCRIPTION - DESCRIPTORS: DESCRIPTORS: SORE

## 2021-04-30 ASSESSMENT — PAIN DESCRIPTION - FREQUENCY: FREQUENCY: CONTINUOUS

## 2021-04-30 ASSESSMENT — PAIN DESCRIPTION - PAIN TYPE: TYPE: ACUTE PAIN

## 2021-04-30 NOTE — PROGRESS NOTES
PROGRESS NOTE  S:45 yrs Patient  admitted on 4/20/2021 with Acute recurrent pancreatitis [K85.90] .     Tolerating clears,  Coughed up NJ tube and had removed    Current Hospital [facility] Meds   magnesium sulfate  1,000 mg Intravenous Once    enoxaparin  40 mg Subcutaneous Daily    pantoprazole  40 mg Oral BID AC    lipase-protease-amylase  72,000 Units Oral TID WC    meropenem  1,000 mg Intravenous Q8H    oxyCODONE  10 mg Oral 4x Daily    pentoxifylline  400 mg Oral TID WC    cloNIDine  0.1 mg Oral TID    sodium chloride flush  5-40 mL Intravenous 2 times per day    thiamine  100 mg Oral Daily    multivitamin  1 tablet Oral Daily    QUEtiapine  200 mg Oral TID    nicotine  1 patch Transdermal Daily    chlordiazePOXIDE  25 mg Oral 4x Daily     Current Hospital IV Meds   dextrose      sodium chloride 25 mL (04/24/21 0059)    sodium chloride 75 mL/hr at 04/30/21 0810     Current Hospital PRN Meds  glucose, dextrose, glucagon (rDNA), dextrose, oxyCODONE, labetalol, sodium chloride flush, sodium chloride, LORazepam **OR** LORazepam **OR** LORazepam **OR** LORazepam **OR** LORazepam **OR** LORazepam **OR** LORazepam **OR** LORazepam, acetaminophen, promethazine **OR** ondansetron, potassium chloride **OR** potassium alternative oral replacement **OR** potassium chloride, magnesium sulfate    Exam:   Vitals:    04/30/21 1317   BP: 123/83   Pulse: 116   Resp: 18   Temp: 98 °F (36.7 °C)   SpO2: 96%     I/O last 3 completed shifts:  In: -   Out: 400 [Urine:400]   General appearance: alert, appears stated age and cooperative  HEENT: PERRLA  Neck: no adenopathy, no carotid bruit, no JVD, supple, symmetrical, trachea midline and thyroid not enlarged, symmetric, no tenderness/mass/nodules  Lungs: clear to auscultation bilaterally  Heart: regular rate and rhythm, S1, S2 normal, no murmur, click, rub or gallop  Abdomen: soft, non-tender; bowel sounds normal; no masses,  no organomegaly  Extremities: extremities normal, atraumatic, no cyanosis or edema     Labs:  CBC:   Recent Labs     04/28/21  0520 04/29/21  0544 04/30/21  0523   WBC 8.2 10.0 10.9   HGB 8.7* 8.5* 8.1*   HCT 25.7* 24.4* 23.8*   .1* 98.9 99.2    368 438     BMP:   Recent Labs     04/28/21  0520 04/29/21  0544 04/30/21  0523   * 135* 135*   K 3.1* 3.1* 3.5    100 98*   CO2 21 23 25   PHOS 1.3* 1.6* 1.9*   BUN <2* <2* <2*   CREATININE <0.5* <0.5* <0.5*     LIVER PROFILE:   Recent Labs     04/28/21 0520 04/29/21 0544 04/30/21  0523   * 85* 66*   * 103* 82*   PROT 5.2* 5.3* 5.3*   BILITOT 0.5 0.4 0.3   ALKPHOS 107 106 91     PT/INR: No results for input(s): INR in the last 72 hours. Invalid input(s): PT    IMAGING:  Xr Chest (2 Vw)    Result Date: 4/30/2021  EXAMINATION: TWO X-RAY VIEWS OF THE CHEST 4/30/2021 10:37 am COMPARISON: December 25, 2019 HISTORY: ORDERING SYSTEM PROVIDED HISTORY: hypoxic resp failure TECHNOLOGIST PROVIDED HISTORY: Reason for exam:->hypoxic resp failure Reason for Exam: ng tube,  abd pain,   npo Acuity: Acute Type of Exam: Subsequent/Follow-up Initial evaluation, hypoxemia, respiratory failure, acute FINDINGS: The cardiomediastinal silhouette is normal in size. Small bilateral pleural effusions are present. There are subtle, subpleural opacities present bilaterally with a lower lobe predominance. No evidence of pneumothorax. No subdiaphragmatic free air is present. 1. Small bilateral pleural effusions. 2. Patchy bilateral subpleural opacities raising concern for an atypical/viral pneumonia.         Hospital Problems           Last Modified POA    Alcohol abuse 4/22/2021 Yes    Acute recurrent pancreatitis 4/21/2021 Yes    Alcohol withdrawal syndrome with complication ([address] 5/60/2798 Yes    Hypokalemia 4/25/2021 Yes    Moderate protein-calorie malnutrition ([address] 4/30/2021 Yes         Impression:  40 yo male with

## 2021-04-30 NOTE — PLAN OF CARE
Nutrition Problem #1: Moderate malnutrition  Intervention: Food and/or Nutrient Delivery: Start Oral Diet  Nutritional Goals: pt will adhere to NPO status until medically cleared to receive nutrition therapy

## 2021-04-30 NOTE — PROGRESS NOTES
Discussed with Dr. Sara Mckay regarding patient's diet order and informed him that patient is requesting a clear liquid diet, and he stated that he is OK with clear liquid diet.

## 2021-04-30 NOTE — PLAN OF CARE
Problem: Pain:  Goal: Pain level will decrease  Description: Pain level will decrease  4/30/2021 1351 by Elsa Kerr RN  Outcome: Ongoing  4/30/2021 0007 by Radames Beard RN  Outcome: Ongoing  4/30/2021 0006 by Radames Beard RN  Outcome: Ongoing  Goal: Control of acute pain  Description: Control of acute pain  4/30/2021 1351 by Elsa Kerr RN  Outcome: Ongoing  4/30/2021 0007 by Radames Beard RN  Outcome: Ongoing  4/30/2021 0006 by Radames Beard RN  Outcome: Ongoing  Goal: Control of chronic pain  Description: Control of chronic pain  4/30/2021 1351 by Elsa Kerr RN  Outcome: Ongoing  4/30/2021 0007 by Radames Beard RN  Outcome: Ongoing  4/30/2021 0006 by Radames Beard RN  Outcome: Ongoing  Goal: Patient's pain/discomfort is manageable  Description: Patient's pain/discomfort is manageable  4/30/2021 1351 by Elsa Kerr RN  Outcome: Ongoing  4/30/2021 0007 by Radames Beard RN  Outcome: Ongoing  4/30/2021 0006 by Radames Beard RN  Outcome: Ongoing     Problem: Infection:  Goal: Will remain free from infection  Description: Will remain free from infection  4/30/2021 1351 by Elsa Kerr RN  Outcome: Ongoing  4/30/2021 0007 by Radames Beard RN  Outcome: Ongoing     Problem: Safety:  Goal: Free from accidental physical injury  Description: Free from accidental physical injury  4/30/2021 1351 by Elsa Kerr RN  Outcome: Ongoing  4/30/2021 0007 by Radames Beard RN  Outcome: Ongoing  Goal: Free from intentional harm  Description: Free from intentional harm  4/30/2021 1351 by Elsa Kerr RN  Outcome: Ongoing  4/30/2021 0007 by Radames Beard RN  Outcome: Ongoing     Problem: Daily Care:  Goal: Daily care needs are met  Description: Daily care needs are met  4/30/2021 1351 by Elsa Kerr RN  Outcome: Ongoing  4/30/2021 0007 by Radames Beard RN  Outcome: Ongoing     Problem: Skin Integrity:  Goal: Skin integrity will stabilize  Description: Skin integrity will stabilize  4/30/2021 1351 by Nata Horner RN  Outcome: Ongoing  4/30/2021 0007 by Chris Valadez RN  Outcome: Ongoing     Problem: Discharge Planning:  Goal: Patients continuum of care needs are met  Description: Patients continuum of care needs are met  4/30/2021 1351 by Nata Horner RN  Outcome: Ongoing  4/30/2021 0007 by Chris Valadez RN  Outcome: Ongoing     Problem: Nutrition  Goal: Optimal nutrition therapy  4/30/2021 1351 by Nata Horner RN  Outcome: Ongoing  4/30/2021 0007 by Chris Valadez RN  Outcome: Ongoing  Goal: Understanding of nutritional guidelines  4/30/2021 1351 by Nata Horner RN  Outcome: Ongoing  4/30/2021 0007 by Chris Valadez RN  Outcome: Ongoing

## 2021-04-30 NOTE — CARE COORDINATION
INTERDISCIPLINARY PLAN OF CARE CONFERENCE    Date/Time: 4/30/2021 11:06 AM  Completed by: Taylor Galvan Case Management      Patient Name:  Bahman Lazo  YOB: 1975  Admitting Diagnosis: Acute recurrent pancreatitis [K85.90]     Admit Date/Time:  4/20/2021 10:47 PM    Chart reviewed. Interdisciplinary team contacted or reviewed plan related to patient progress and discharge plans. Disciplines included Case Management, Nursing, and Dietitian. Current Status: IP 04/21/2021  PT/OT recommendation for discharge plan of care: need order when appropriate    Expected D/C Disposition:  TBD  Confirmed plan with patient   Discharge Plan Comments: Pt remains very sick. Now on 3 liters of supplemental O2. Feeding tube was dislodged yesterday and patient has been NPO. CM did discuss possible SNF placement and pt is agreeable because he does not have 24/7 supervision at home. He will need PT/OT eval when her can tolerate it.  +CM following

## 2021-04-30 NOTE — PROGRESS NOTES
Comprehensive Nutrition Assessment    Type and Reason for Visit:  Reassess    Nutrition Recommendations/Plan:   1. Noted Pt requests Clear Liquids  2. Consider TPN id clears not tolerated : Clinimix 5/20 @ 83 ml /hr (2L per 24) with 250 gr lipids 20% lipids 2 x per week      Nutrition Assessment:  pt has declined nutritionally and noted thania moderately malnourished on day 9 of his admission r/t he unable to tolerate the NJ TF and currently NPO his intakes has been < 50% for > 7 days and he has noted fat and muscle; fluids per IVF'S are masking a possible weight loss ; At risk for further compromise d/t pt unable to tolerate PO diet r/t acute recurrent pancreatitis,  ETOH abuse +withdrawal, N/V PTA, +altered nutrition related lab values; Will monitor tolerance for CL and add ensure Clear also placed recs for TPN     Malnutrition Assessment:  Malnutrition Status: Moderate malnutrition    Context:  Acute Illness     Findings of the 6 clinical characteristics of malnutrition:  Energy Intake:  7 - 50% or less of estimated energy requirements for 5 or more days  Weight Loss:  No significant weight loss     Body Fat Loss:  1 - Mild body fat loss Orbital   Muscle Mass Loss:  7 - Moderate muscle mass loss Temples (temporalis), Clavicles (pectoralis & deltoids), Scapula (trapezius)  Fluid Accumulation:  Unable to assess     Strength:  Not Performed    Estimated Daily Nutrient Needs:  Energy (kcal):  9002-7176 kcals per day based on 25-27 kcals/kg/CBW; Weight Used for Energy Requirements:  Current     Protein (g):  81-97 g protein per day based on 1-1.2 g/kg/CBW;  Weight Used for Protein Requirements:  Current        Fluid (ml/day):  0967-4152 ml/day based on 1 ml/kcal; Method Used for Fluid Requirements:  1 ml/kcal      Nutrition Related Findings:  pt awake A & O x 4 conversive and frindly; multi tatoos noted on face neck and arms; wearing glasses; states NJ TF was dislodged and prefers to not have another placed d/t

## 2021-05-01 PROBLEM — R09.02 HYPOXIA: Status: ACTIVE | Noted: 2021-05-01

## 2021-05-01 LAB
A/G RATIO: 0.7 (ref 1.1–2.2)
ALBUMIN SERPL-MCNC: 2.1 G/DL (ref 3.4–5)
ALP BLD-CCNC: 84 U/L (ref 40–129)
ALT SERPL-CCNC: 69 U/L (ref 10–40)
ANION GAP SERPL CALCULATED.3IONS-SCNC: 8 MMOL/L (ref 3–16)
AST SERPL-CCNC: 53 U/L (ref 15–37)
BILIRUB SERPL-MCNC: 0.3 MG/DL (ref 0–1)
BLOOD CULTURE, ROUTINE: NORMAL
BUN BLDV-MCNC: <2 MG/DL (ref 7–20)
CALCIUM SERPL-MCNC: 8.5 MG/DL (ref 8.3–10.6)
CHLORIDE BLD-SCNC: 100 MMOL/L (ref 99–110)
CO2: 30 MMOL/L (ref 21–32)
CREAT SERPL-MCNC: <0.5 MG/DL (ref 0.9–1.3)
CULTURE, BLOOD 2: NORMAL
GFR AFRICAN AMERICAN: >60
GFR NON-AFRICAN AMERICAN: >60
GLOBULIN: 3 G/DL
GLUCOSE BLD-MCNC: 103 MG/DL (ref 70–99)
GLUCOSE BLD-MCNC: 105 MG/DL (ref 70–99)
GLUCOSE BLD-MCNC: 119 MG/DL (ref 70–99)
GLUCOSE BLD-MCNC: 94 MG/DL (ref 70–99)
GLUCOSE BLD-MCNC: 97 MG/DL (ref 70–99)
HCT VFR BLD CALC: 24.2 % (ref 40.5–52.5)
HEMOGLOBIN: 8.2 G/DL (ref 13.5–17.5)
MAGNESIUM: 2 MG/DL (ref 1.8–2.4)
MCH RBC QN AUTO: 33.9 PG (ref 26–34)
MCHC RBC AUTO-ENTMCNC: 33.9 G/DL (ref 31–36)
MCV RBC AUTO: 100.1 FL (ref 80–100)
PDW BLD-RTO: 15.9 % (ref 12.4–15.4)
PERFORMED ON: ABNORMAL
PERFORMED ON: ABNORMAL
PERFORMED ON: NORMAL
PERFORMED ON: NORMAL
PHOSPHORUS: 2.7 MG/DL (ref 2.5–4.9)
PLATELET # BLD: 478 K/UL (ref 135–450)
PMV BLD AUTO: 7.5 FL (ref 5–10.5)
POTASSIUM REFLEX MAGNESIUM: 3.3 MMOL/L (ref 3.5–5.1)
RBC # BLD: 2.41 M/UL (ref 4.2–5.9)
SARS-COV-2, NAAT: NOT DETECTED
SODIUM BLD-SCNC: 138 MMOL/L (ref 136–145)
TOTAL PROTEIN: 5.1 G/DL (ref 6.4–8.2)
WBC # BLD: 7.1 K/UL (ref 4–11)

## 2021-05-01 PROCEDURE — 1200000000 HC SEMI PRIVATE

## 2021-05-01 PROCEDURE — 6370000000 HC RX 637 (ALT 250 FOR IP): Performed by: INTERNAL MEDICINE

## 2021-05-01 PROCEDURE — 2580000003 HC RX 258: Performed by: PHYSICIAN ASSISTANT

## 2021-05-01 PROCEDURE — 85027 COMPLETE CBC AUTOMATED: CPT

## 2021-05-01 PROCEDURE — 94761 N-INVAS EAR/PLS OXIMETRY MLT: CPT

## 2021-05-01 PROCEDURE — 2700000000 HC OXYGEN THERAPY PER DAY

## 2021-05-01 PROCEDURE — 2580000003 HC RX 258: Performed by: INTERNAL MEDICINE

## 2021-05-01 PROCEDURE — 83735 ASSAY OF MAGNESIUM: CPT

## 2021-05-01 PROCEDURE — 80053 COMPREHEN METABOLIC PANEL: CPT

## 2021-05-01 PROCEDURE — 99223 1ST HOSP IP/OBS HIGH 75: CPT | Performed by: INTERNAL MEDICINE

## 2021-05-01 PROCEDURE — 6360000002 HC RX W HCPCS: Performed by: INTERNAL MEDICINE

## 2021-05-01 PROCEDURE — 87635 SARS-COV-2 COVID-19 AMP PRB: CPT

## 2021-05-01 PROCEDURE — 99233 SBSQ HOSP IP/OBS HIGH 50: CPT | Performed by: INTERNAL MEDICINE

## 2021-05-01 PROCEDURE — 6370000000 HC RX 637 (ALT 250 FOR IP): Performed by: PHYSICIAN ASSISTANT

## 2021-05-01 PROCEDURE — 84100 ASSAY OF PHOSPHORUS: CPT

## 2021-05-01 PROCEDURE — 36415 COLL VENOUS BLD VENIPUNCTURE: CPT

## 2021-05-01 RX ORDER — CHLORDIAZEPOXIDE HYDROCHLORIDE 10 MG/1
10 CAPSULE, GELATIN COATED ORAL 3 TIMES DAILY
Status: DISCONTINUED | OUTPATIENT
Start: 2021-05-01 | End: 2021-05-01 | Stop reason: DRUGHIGH

## 2021-05-01 RX ORDER — POTASSIUM CHLORIDE 20 MEQ/1
40 TABLET, EXTENDED RELEASE ORAL ONCE
Status: COMPLETED | OUTPATIENT
Start: 2021-05-01 | End: 2021-05-01

## 2021-05-01 RX ORDER — CHLORDIAZEPOXIDE HYDROCHLORIDE 10 MG/1
10 CAPSULE, GELATIN COATED ORAL 4 TIMES DAILY
Status: DISCONTINUED | OUTPATIENT
Start: 2021-05-01 | End: 2021-05-01

## 2021-05-01 RX ORDER — CHLORDIAZEPOXIDE HYDROCHLORIDE 10 MG/1
10 CAPSULE, GELATIN COATED ORAL 3 TIMES DAILY
Status: DISCONTINUED | OUTPATIENT
Start: 2021-05-01 | End: 2021-05-01

## 2021-05-01 RX ORDER — CHLORDIAZEPOXIDE HYDROCHLORIDE 10 MG/1
10 CAPSULE, GELATIN COATED ORAL 3 TIMES DAILY
Status: COMPLETED | OUTPATIENT
Start: 2021-05-01 | End: 2021-05-03

## 2021-05-01 RX ADMIN — PANCRELIPASE 72000 UNITS: 24000; 76000; 120000 CAPSULE, DELAYED RELEASE PELLETS ORAL at 08:48

## 2021-05-01 RX ADMIN — OXYCODONE HYDROCHLORIDE 10 MG: 5 TABLET ORAL at 16:40

## 2021-05-01 RX ADMIN — OXYCODONE HYDROCHLORIDE 5 MG: 5 TABLET ORAL at 04:12

## 2021-05-01 RX ADMIN — CHLORDIAZEPOXIDE HYDROCHLORIDE 25 MG: 25 CAPSULE ORAL at 08:46

## 2021-05-01 RX ADMIN — SODIUM CHLORIDE: 9 INJECTION, SOLUTION INTRAVENOUS at 12:59

## 2021-05-01 RX ADMIN — MEROPENEM 1000 MG: 1 INJECTION, POWDER, FOR SOLUTION INTRAVENOUS at 01:25

## 2021-05-01 RX ADMIN — CLONIDINE HYDROCHLORIDE 0.1 MG: 0.1 TABLET ORAL at 12:58

## 2021-05-01 RX ADMIN — PANCRELIPASE 72000 UNITS: 24000; 76000; 120000 CAPSULE, DELAYED RELEASE PELLETS ORAL at 16:40

## 2021-05-01 RX ADMIN — SODIUM CHLORIDE: 9 INJECTION, SOLUTION INTRAVENOUS at 20:28

## 2021-05-01 RX ADMIN — Medication 10 ML: at 08:47

## 2021-05-01 RX ADMIN — OXYCODONE HYDROCHLORIDE 10 MG: 5 TABLET ORAL at 12:57

## 2021-05-01 RX ADMIN — ACETAMINOPHEN 650 MG: 325 TABLET ORAL at 01:52

## 2021-05-01 RX ADMIN — CHLORDIAZEPOXIDE HYDROCHLORIDE 10 MG: 10 CAPSULE ORAL at 12:57

## 2021-05-01 RX ADMIN — PENTOXIFYLLINE 400 MG: 400 TABLET, FILM COATED, EXTENDED RELEASE ORAL at 12:58

## 2021-05-01 RX ADMIN — MEROPENEM 1000 MG: 1 INJECTION, POWDER, FOR SOLUTION INTRAVENOUS at 16:41

## 2021-05-01 RX ADMIN — CLONIDINE HYDROCHLORIDE 0.1 MG: 0.1 TABLET ORAL at 08:46

## 2021-05-01 RX ADMIN — QUETIAPINE FUMARATE 200 MG: 200 TABLET ORAL at 20:27

## 2021-05-01 RX ADMIN — QUETIAPINE FUMARATE 200 MG: 200 TABLET ORAL at 08:46

## 2021-05-01 RX ADMIN — Medication 100 MG: at 08:46

## 2021-05-01 RX ADMIN — MULTIPLE VITAMINS W/ MINERALS TAB 1 TABLET: TAB at 08:46

## 2021-05-01 RX ADMIN — PENTOXIFYLLINE 400 MG: 400 TABLET, FILM COATED, EXTENDED RELEASE ORAL at 08:48

## 2021-05-01 RX ADMIN — CLONIDINE HYDROCHLORIDE 0.1 MG: 0.1 TABLET ORAL at 20:27

## 2021-05-01 RX ADMIN — PANTOPRAZOLE SODIUM 40 MG: 40 TABLET, DELAYED RELEASE ORAL at 08:55

## 2021-05-01 RX ADMIN — PANTOPRAZOLE SODIUM 40 MG: 40 TABLET, DELAYED RELEASE ORAL at 16:40

## 2021-05-01 RX ADMIN — MEROPENEM 1000 MG: 1 INJECTION, POWDER, FOR SOLUTION INTRAVENOUS at 08:46

## 2021-05-01 RX ADMIN — PENTOXIFYLLINE 400 MG: 400 TABLET, FILM COATED, EXTENDED RELEASE ORAL at 16:40

## 2021-05-01 RX ADMIN — OXYCODONE HYDROCHLORIDE 10 MG: 5 TABLET ORAL at 20:27

## 2021-05-01 RX ADMIN — OXYCODONE HYDROCHLORIDE 10 MG: 5 TABLET ORAL at 08:46

## 2021-05-01 RX ADMIN — ACETAMINOPHEN 650 MG: 325 TABLET ORAL at 20:27

## 2021-05-01 RX ADMIN — CHLORDIAZEPOXIDE HYDROCHLORIDE 10 MG: 10 CAPSULE ORAL at 20:28

## 2021-05-01 RX ADMIN — POTASSIUM CHLORIDE 40 MEQ: 1500 TABLET, EXTENDED RELEASE ORAL at 12:57

## 2021-05-01 RX ADMIN — PANCRELIPASE 72000 UNITS: 24000; 76000; 120000 CAPSULE, DELAYED RELEASE PELLETS ORAL at 12:58

## 2021-05-01 RX ADMIN — QUETIAPINE FUMARATE 200 MG: 200 TABLET ORAL at 12:57

## 2021-05-01 ASSESSMENT — PAIN SCALES - GENERAL
PAINLEVEL_OUTOF10: 0
PAINLEVEL_OUTOF10: 0
PAINLEVEL_OUTOF10: 9
PAINLEVEL_OUTOF10: 0
PAINLEVEL_OUTOF10: 6

## 2021-05-01 NOTE — PLAN OF CARE
Problem: Pain:  Goal: Pain level will decrease  Description: Pain level will decrease  Outcome: Ongoing  Goal: Control of acute pain  Description: Control of acute pain  Outcome: Ongoing  Goal: Control of chronic pain  Description: Control of chronic pain  Outcome: Ongoing  Goal: Patient's pain/discomfort is manageable  Description: Patient's pain/discomfort is manageable  Outcome: Ongoing     Problem: Infection:  Goal: Will remain free from infection  Description: Will remain free from infection  Outcome: Ongoing     Problem: Safety:  Goal: Free from accidental physical injury  Description: Free from accidental physical injury  Outcome: Ongoing  Goal: Free from intentional harm  Description: Free from intentional harm  Outcome: Ongoing     Problem: Daily Care:  Goal: Daily care needs are met  Description: Daily care needs are met  Outcome: Ongoing     Problem: Skin Integrity:  Goal: Skin integrity will stabilize  Description: Skin integrity will stabilize  Outcome: Ongoing     Problem: Discharge Planning:  Goal: Patients continuum of care needs are met  Description: Patients continuum of care needs are met  Outcome: Ongoing     Problem: Nutrition  Goal: Optimal nutrition therapy  Outcome: Ongoing  Goal: Understanding of nutritional guidelines  Outcome: Ongoing

## 2021-05-01 NOTE — FLOWSHEET NOTE
04/30/21 2015   Vital Signs   Temp 100.6 °F (38.1 °C)   Temp Source Oral   Pulse 118   Resp 18   /74   BP Location Right upper arm   Level of Consciousness Alert (0)   MEWS Score 3   Oxygen Therapy   SpO2 (!) 84 %   O2 Device None (Room air)   Tylenol 2 po given to pt for fever. Pt had 02 off. Replaced 02 at 2lnc, saturation increased to 94%.  Gary Lamar

## 2021-05-01 NOTE — PROGRESS NOTES
Hospitalist Progress Note      PCP: Latrice Sena MD    Date of Admission: 4/20/2021    Subjective: spiked a fever of 101.2F, didn't tolerate NG tube    Medications:  Reviewed    Infusion Medications    dextrose      sodium chloride 25 mL (04/24/21 0059)    sodium chloride 75 mL/hr at 04/30/21 2023     Scheduled Medications    magnesium sulfate  1,000 mg Intravenous Once    enoxaparin  40 mg Subcutaneous Daily    pantoprazole  40 mg Oral BID AC    lipase-protease-amylase  72,000 Units Oral TID WC    meropenem  1,000 mg Intravenous Q8H    oxyCODONE  10 mg Oral 4x Daily    pentoxifylline  400 mg Oral TID WC    cloNIDine  0.1 mg Oral TID    sodium chloride flush  5-40 mL Intravenous 2 times per day    thiamine  100 mg Oral Daily    multivitamin  1 tablet Oral Daily    QUEtiapine  200 mg Oral TID    nicotine  1 patch Transdermal Daily    chlordiazePOXIDE  25 mg Oral 4x Daily     PRN Meds: glucose, dextrose, glucagon (rDNA), dextrose, oxyCODONE, labetalol, sodium chloride flush, sodium chloride, LORazepam **OR** LORazepam **OR** LORazepam **OR** LORazepam **OR** LORazepam **OR** LORazepam **OR** LORazepam **OR** LORazepam, acetaminophen, promethazine **OR** ondansetron, potassium chloride **OR** potassium alternative oral replacement **OR** potassium chloride, magnesium sulfate      Intake/Output Summary (Last 24 hours) at 5/1/2021 0055  Last data filed at 4/30/2021 2258  Gross per 24 hour   Intake --   Output 400 ml   Net -400 ml       Physical Exam Performed:    /74   Pulse 118   Temp 98.5 °F (36.9 °C) (Oral)   Resp 18   Ht 6' (1.829 m)   Wt 178 lb 11.2 oz (81.1 kg)   SpO2 94%   BMI 24.24 kg/m²     General appearance:  No distress  Awake alert and oriented  Appears ill  HEENT:  Normal cephalic, atraumatic without obvious deformity. Conjunctivae/corneas clear. Neck: Supple,  Trachea midline. Respiratory:  Normal respiratory effort.  Clear to auscultation, bilaterally without Rales/Wheezes/Rhonchi. Cardiovascular:   Tachycardic,  normal S1/S2 without murmurs, rubs or gallops. Abdomen: mildly distended, mild diffuse tenderness present , normal bowel sounds  Skin: Skin color, texture, turgor normal.  No rashes or lesions. Neurologic:  Neurovascularly intact without any focal sensory/motor deficits. Cranial nerves: II-XII intact, grossly non-focal.  Psychiatric:  Alert and oriented, thought content appropriate, normal insight  Peripheral Pulses: +2 palpable, equal bilaterally        Labs:   Recent Labs     04/28/21 0520 04/29/21 0544 04/30/21 0523   WBC 8.2 10.0 10.9   HGB 8.7* 8.5* 8.1*   HCT 25.7* 24.4* 23.8*    368 438     Recent Labs     04/28/21 0520 04/29/21 0544 04/30/21 0523   * 135* 135*   K 3.1* 3.1* 3.5    100 98*   CO2 21 23 25   BUN <2* <2* <2*   CREATININE <0.5* <0.5* <0.5*   CALCIUM 8.0* 8.2* 8.2*   PHOS 1.3* 1.6* 1.9*     Recent Labs     04/28/21 0520 04/29/21 0544 04/30/21 0523   * 85* 66*   * 103* 82*   BILITOT 0.5 0.4 0.3   ALKPHOS 107 106 91     No results for input(s): INR in the last 72 hours. No results for input(s): Ethelene Soulier in the last 72 hours. Urinalysis:      Lab Results   Component Value Date    NITRU Negative 04/23/2021    WBCUA 0-2 04/23/2021    BACTERIA 1+ 04/23/2021    RBCUA 3-4 04/23/2021    BLOODU MODERATE 04/23/2021    SPECGRAV >=1.030 04/23/2021    GLUCOSEU Negative 04/23/2021       Radiology:  XR CHEST (2 VW)   Preliminary Result   1. Small bilateral pleural effusions. 2. Patchy bilateral subpleural opacities raising concern for an   atypical/viral pneumonia. CT ABDOMEN PELVIS W IV CONTRAST Additional Contrast? None   Final Result   Severe acute pancreatitis which has significantly worsened since 04/21/2021. Focal areas of the pancreas which do not enhance and are worrisome for   pancreatic necrosis. Suspected developing pseudocyst in the magalie hepatis.    Large amount of inflammatory ascites in the upper abdomen and left pericolic   gutter extending into the pelvis. Feeding tube in place. IR GI TUBE W FLUOROSCOPY   Final Result   Successful placement of a post pyloric feeding tube. The tube was secured at   the 105 cm natty. IR GI TUBE W FLUOROSCOPY   Final Result   Successful placement of a post pyloric feeding tube. The tube was secured to   the nose at the 95 cm natty         CT ABDOMEN PELVIS W IV CONTRAST Additional Contrast? None   Final Result   Acute pancreatitis with questionable developing necrosis in the pancreatic   tail. Fluid collection abutting the inferior aspect of the pancreatic head likely a   pseudocyst or abscess. Marked diffuse hepatic steatosis. Assessment/Plan:    Active Hospital Problems    Diagnosis    Moderate protein-calorie malnutrition ([address] [E44.0]    Hypokalemia [E87.6]    Alcohol withdrawal syndrome with complication (HCC) [L98.268]    Acute recurrent pancreatitis [K85.90]    Alcohol abuse [F10.10]         Acute Recurrent Alcoholic Pancreatitis with Poss Abscess  - CT showed acute pancreatitis with questionable developing necrosis of the pancreatic tail with fluid collection abutting the inferior aspect of the pancreatic head  - Lipase on admission: 1173  - s/p EUS with FNA on 8/20 which was neg for malignant cells with features suggestive of fat necrosis; f/w Dr. Jorgito Rojo  - Admitted to Med Surg  - advised abstinence from alcohol  - NPO, aggressive IVF.  - GI consulted  - Pt w/ persistent fevers, was on IV Zosyn x 6 days  - was on dilaudid - Switched to oxycodone orally for pain control  - He had persistent abdominal pain and did not tolerate initiation of clear liquid diet.   - NJ tube placed twice . initiated on nasoenteric tube feeds for a day . > NJ tube came out again on 4/28 night .   - keep  N.p.o. , continue IV fluids.   - has persistent fevers, repeated CT scan of the abdomen and pelvis on 4/27/2021 >  showed worsening pancreatitis with necrosis. - Antibiotics switched to IV Merrem day # 5 , continue. - creon added        Leukocytosis - Resolved  - 23.9 > 16.4 > 7.5 > 10.0  - likely 2/2 above  - trended down     Hypokalemia, Hypophosphatemia and hypomagnesemia  - cont to replete aggressively     Acute Hypoxic Respiratory Failure  - no home O2  - 88% on RA, now on 3L   - supp O2, wean as tolerated     Hepatic Steatosis  Elevated LFTs  - noted on CT abdomen  - LFTs trending down, monitor  - encouraged  alcohol cessation     Alcohol Abuse  Alcohol Withdrawal  - last drink was 4/20  - fall and seizure protocol  - cont [address] Librium, CIWA protocol     Tachycardia   - Cont clonidine  - tachycardia likely from alcohol withdrawal syndrome & pancreatitis     Hyponatremia   - monitor with IV hydration  - Improved; sodium is up to 135 today        DVT Prophylaxis: Lovenox.   Diet: DIET CLEAR LIQUID;  Dietary Nutrition Supplements: Clear Liquid Oral Supplement  Code Status: Full Code    PT/OT Eval Status: ordered    Rhonda Snow MD

## 2021-05-01 NOTE — FLOWSHEET NOTE
04/30/21 2230   Vital Signs   Temp 98.5 °F (36.9 °C)   Temp Source Oral   Pt was sweaty from fever breaking. Bed linens changed. Pt's gown/pants changed. Pt cleaned self up with bath wipes.   [employer]

## 2021-05-01 NOTE — FLOWSHEET NOTE
05/01/21 0145   Vital Signs   Temp 96.9 °F (36.1 °C)   Temp Source Oral   Pulse 104   Resp 16   /86   BP Location Right upper arm   Level of Consciousness Alert (0)   MEWS Score 2   Oxygen Therapy   SpO2 (!) 80 %   O2 Device None (Room air)   Pt woke up with 02 off, reapplied 02 at 3lnc, to get saturation above 90%. Currently 92% 3lnc. Tylenol given po for c/o generalized body aches.  Reid Christianson none

## 2021-05-01 NOTE — PROGRESS NOTES
Hospitalist Progress Note      PCP: Laly Reis MD    Date of Admission: 4/20/2021    Subjective: Josiah Marcus states he feels better overall, still with abd pain. tolerating clears  - Hypoxic to 80% on RA, stable on 3L  - CXR 4/30 with bilateral effusions and bilateral subpleural opacities     Medications:  Reviewed    Infusion Medications    dextrose      sodium chloride 25 mL (04/24/21 0059)    sodium chloride 75 mL/hr at 04/30/21 2023     Scheduled Medications    chlordiazePOXIDE  10 mg Oral 4x Daily    magnesium sulfate  1,000 mg Intravenous Once    enoxaparin  40 mg Subcutaneous Daily    pantoprazole  40 mg Oral BID AC    lipase-protease-amylase  72,000 Units Oral TID WC    meropenem  1,000 mg Intravenous Q8H    oxyCODONE  10 mg Oral 4x Daily    pentoxifylline  400 mg Oral TID WC    cloNIDine  0.1 mg Oral TID    sodium chloride flush  5-40 mL Intravenous 2 times per day    thiamine  100 mg Oral Daily    multivitamin  1 tablet Oral Daily    QUEtiapine  200 mg Oral TID    nicotine  1 patch Transdermal Daily     PRN Meds: glucose, dextrose, glucagon (rDNA), dextrose, oxyCODONE, labetalol, sodium chloride flush, sodium chloride, LORazepam **OR** LORazepam **OR** LORazepam **OR** LORazepam **OR** LORazepam **OR** LORazepam **OR** LORazepam **OR** LORazepam, acetaminophen, promethazine **OR** ondansetron, potassium chloride **OR** potassium alternative oral replacement **OR** potassium chloride, magnesium sulfate      Intake/Output Summary (Last 24 hours) at 5/1/2021 0942  Last data filed at 5/1/2021 0330  Gross per 24 hour   Intake 480 ml   Output 400 ml   Net 80 ml       Physical Exam Performed:    /85   Pulse 100   Temp 97.6 °F (36.4 °C) (Oral)   Resp 16   Ht 6' (1.829 m)   Wt 178 lb 11.2 oz (81.1 kg)   SpO2 94%   BMI 24.24 kg/m²     Gen: Disheveled appearing male. No distress. Alert. Eyes: PERRL. No sclera icterus. No conjunctival injection. ENT: No discharge. Pharynx clear.    Neck: No JVD. Trachea midline. Resp: No accessory muscle use. No crackles. No wheezes. No rhonchi. Diminished breath sounds bilaterally, moreso in the bases   CV: Tachycardic rate. Regular rhythm. No murmur. No rub. No edema. GI: Mildly distended abdomen with tender epigastrium. BS active   Skin: Warm and dry. No nodule on exposed extremities. No rash on exposed extremities. M/S: No cyanosis. No joint deformity. No clubbing. Neuro: Awake. Grossly nonfocal    Psych: Oriented x 3. No anxiety or agitation. Labs:   Recent Labs     04/29/21 0544 04/30/21  0523 05/01/21  0552   WBC 10.0 10.9 7.1   HGB 8.5* 8.1* 8.2*   HCT 24.4* 23.8* 24.2*    438 478*     Recent Labs     04/29/21 0544 04/30/21  0523 05/01/21  0552   * 135* 138   K 3.1* 3.5 3.3*    98* 100   CO2 23 25 30   BUN <2* <2* <2*   CREATININE <0.5* <0.5* <0.5*   CALCIUM 8.2* 8.2* 8.5   PHOS 1.6* 1.9* 2.7     Recent Labs     04/29/21 0544 04/30/21  0523 05/01/21  0552   AST 85* 66* 53*   * 82* 69*   BILITOT 0.4 0.3 0.3   ALKPHOS 106 91 84     No results for input(s): INR in the last 72 hours. No results for input(s): [place] Bash in the last 72 hours. Urinalysis:      Lab Results   Component Value Date    NITRU Negative 04/23/2021    WBCUA 0-2 04/23/2021    BACTERIA 1+ 04/23/2021    RBCUA 3-4 04/23/2021    BLOODU MODERATE 04/23/2021    SPECGRAV >=1.030 04/23/2021    GLUCOSEU Negative 04/23/2021       Radiology:  XR CHEST (2 VW)   Preliminary Result   1. Small bilateral pleural effusions. 2. Patchy bilateral subpleural opacities raising concern for an   atypical/viral pneumonia. CT ABDOMEN PELVIS W IV CONTRAST Additional Contrast? None   Final Result   Severe acute pancreatitis which has significantly worsened since 04/21/2021. Focal areas of the pancreas which do not enhance and are worrisome for   pancreatic necrosis. Suspected developing pseudocyst in the magalie hepatis.    Large amount of inflammatory ascites in the upper abdomen and left pericolic   gutter extending into the pelvis. Feeding tube in place. IR GI TUBE W FLUOROSCOPY   Final Result   Successful placement of a post pyloric feeding tube. The tube was secured at   the 105 cm natty. IR GI TUBE W FLUOROSCOPY   Final Result   Successful placement of a post pyloric feeding tube. The tube was secured to   the nose at the 95 cm natty         CT ABDOMEN PELVIS W IV CONTRAST Additional Contrast? None   Final Result   Acute pancreatitis with questionable developing necrosis in the pancreatic   tail. Fluid collection abutting the inferior aspect of the pancreatic head likely a   pseudocyst or abscess. Marked diffuse hepatic steatosis. Kae Mckay have reviewed the chart on [facility] and personally interviewed and examined patient, reviewed the data (labs and imaging) and after discussion with my PA formulated the plan. Agree with note with the following edits. HPI:     Having some epigastric pain. Is hypoxic. He is a smoker. No chest pain. He is a smoker. I reviewed the patient's Past Medical History, Past Surgical History, Medications, and Allergies. Physical exam:    /85   Pulse 100   Temp 97.6 °F (36.4 °C) (Oral)   Resp 16   Ht 6' (1.829 m)   Wt 178 lb 11.2 oz (81.1 kg)   SpO2 94%   BMI 24.24 kg/m²     Gen: No distress. Alert. Eyes: PERRL. No sclera icterus. No conjunctival injection. ENT: No discharge. Pharynx clear. Neck: Trachea midline. Normal thyroid. Resp: No accessory muscle use. No crackles. No wheezes. No rhonchi. No dullness on percussion. CV: Regular rate. Regular rhythm. No murmur or rub. No edema. GI: epigastric tenderness. Non-distended. No masses. No organomegaly. Normal bowel sounds. No hernia.             Assessment/Plan:    #Acute Recurrent Alcoholic Pancreatitis with Poss Abscess  - CT showed acute pancreatitis with questionable developing necrosis of the pancreatic tail with fluid collection abutting the inferior aspect of the pancreatic head  - s/p EUS with FNA on 8/20 which was neg for malignant cells with features suggestive of fat necrosis; f/w Dr. Trudy Head- consulted - added pentoxyf  - aggressive IVF and NPO started on admission  - Persistent fevers- sp 6 days of zosyn. Repeated CT on 4/27 which showed worsening pancreatitis with necrosis. Now on Merrem D#4  - NJ tube placed x 2 and received post pyloric feeds x 1 day, but tube came out again on 4/28. Now on clear liquids and tolerating   - creon added  - abstinence from alcohol strongly advised      #Hypoxia   - patient was 80% on RA   - suspect related to acute pancreatitis, possible evolving ARDS  - CXR with bilateral effusions and subpleural opacities   - Will repeat rapid COVID 19 test today with PCR   - start IS  - closely monitor- continuous pulse oximetry   - pulmonary consultation    #Leukocytosis - Resolved  - 23.9 > 16.4 > 7.5 > 10.0  - likely 2/2 above  - trended down     #Hypokalemia, Hypophosphatemia and hypomagnesemia  - cont to replete aggressively     #Hepatic Steatosis  #Elevated LFTs  - noted on CT abdomen  - LFTs trending down, monitor  - encouraged  alcohol cessation     #Alcohol Abuse  #Alcohol Withdrawal  - last drink was 4/20  - fall and seizure protocol  - cont [address] Librium-> decr dose today 5/1 to 10 mg TID and then stop after 3 more days   - stop CIWA      #Tachycardia   - Cont clonidine  - tachycardia likely from alcohol withdrawal syndrome & pancreatitis     #Hyponatremia - resolved   - monitor with IV hydration  - Improved; sodium is up to 138 today      #Normocytic anemia   - Hb 17 on admit (suspect with dehydration)-> 8 and remaining stable at 8 for the past few days  - no apparent bleeding  - iron studies with elevated ferritin, but low Iron      DVT Prophylaxis: Lovenox.   Diet: DIET CLEAR LIQUID;  Dietary Nutrition Supplements: Clear Liquid Oral Supplement  Code

## 2021-05-01 NOTE — CONSULTS
Patient is being seen at the request of Fede Chun for a consultation for hypoxia, pancreatitis    HISTORY OF PRESENT ILLNESS: 82-year-old male with history of alcohol abuse and prior acute pancreatitis, SP celiac plexus block, ongoing alcohol abuse, who presented to the emergency department on 4/20/2021 with a history of severe abdominal pain, exacerbated by heavy drinking, similar to prior pancreatitis, feeling better since he has been in the hospital and treated with pain medication. Today, the patient was noted to be hypoxemic requiring 3 L submental oxygen his chest x-ray showed bilateral effusions and probable interstitial pulmonary edema. Dr. Scout Tim and I discussed this patient at the bedside and he asked that I see the patient for hypoxemia. PAST MEDICAL HISTORY:  Past Medical History:   Diagnosis Date    Alcoholism ([facility] [address] [address]     Antisocial behavior     Bipolar affective disorder ([facility]     Depression     Hyperlipidemia     Hypertension     Insomnia     Low back pain     Panic disorder     Polysubstance abuse ([facility] [facility]     Tobacco abuse      PAST SURGICAL HISTORY:  Past Surgical History:   Procedure Laterality Date    FOOT SURGERY  Age 17    ORIF Right foot.  UPPER GASTROINTESTINAL ENDOSCOPY N/A 11/5/2020    EGD ESOPHAGOGASTRODUODENOSCOPY performed by Karla Chester DO at [address]  11/5/2020    EGD ESOPHAGOGASTRODUODENOSCOPY ULTRASOUND performed by Karla Chester DO at [facility] EXTRACTION         FAMILY HISTORY:  family history includes Alcohol Abuse in his paternal aunt, paternal grandfather, paternal grandmother, and paternal uncle;  Anxiety Disorder in his maternal grandmother and mother; Cancer in his maternal grandmother; Depression in his maternal grandmother and mother; Heart Disease in his maternal grandmother, paternal grandfather, and paternal grandmother; High Blood Pressure in his father, maternal grandfather, maternal grandmother, mother, paternal grandfather, and paternal grandmother; Stroke in his paternal grandmother; Substance Abuse in his paternal grandfather. SOCIAL HISTORY:   reports that he has been smoking cigarettes. He has a 47.50 pack-year smoking history. He has quit using smokeless tobacco.    Scheduled Meds:   potassium chloride  40 mEq Oral Once    chlordiazePOXIDE  10 mg Oral TID    magnesium sulfate  1,000 mg Intravenous Once    enoxaparin  40 mg Subcutaneous Daily    pantoprazole  40 mg Oral BID AC    lipase-protease-amylase  72,000 Units Oral TID WC    meropenem  1,000 mg Intravenous Q8H    oxyCODONE  10 mg Oral 4x Daily    pentoxifylline  400 mg Oral TID WC    cloNIDine  0.1 mg Oral TID    sodium chloride flush  5-40 mL Intravenous 2 times per day    thiamine  100 mg Oral Daily    multivitamin  1 tablet Oral Daily    QUEtiapine  200 mg Oral TID    nicotine  1 patch Transdermal Daily     Continuous Infusions:   dextrose      sodium chloride 25 mL (04/24/21 0059)    sodium chloride 75 mL/hr at 04/30/21 2023     PRN Meds:  glucose, dextrose, glucagon (rDNA), dextrose, oxyCODONE, labetalol, sodium chloride flush, sodium chloride, acetaminophen, promethazine **OR** ondansetron, potassium chloride **OR** potassium alternative oral replacement **OR** potassium chloride, magnesium sulfate    ALLERGIES:  Patient has No Known Allergies.     REVIEW OF SYSTEMS:  Constitutional: Negative for fever  HENT: Negative for sore throat  Eyes: Negative for redness   Respiratory: Negative for dyspnea, cough  Cardiovascular: Negative for chest pain  Gastrointestinal: Abdominal pain  Genitourinary: Negative for hematuria   Musculoskeletal: Negative for arthralgias   Skin: Negative for rash  Neurological: Negative for syncope  Hematological: Negative for adenopathy  Psychiatric/Behavorial: Negative for anxiety    PHYSICAL EXAM:  Blood pressure 137/85, pulse 100, temperature 97.6 °F (36.4 °C), temperature source Oral, resp. rate 16, height 6' (1.829 m), weight 178 lb 11.2 oz (81.1 kg), SpO2 94 %.' on 3 L  Gen: No distress. Eyes: PERRL. No sclera icterus. No conjunctival injection. ENT: No discharge. Pharynx clear. Neck: Trachea midline. No obvious mass. Resp: No accessory muscle use. No crackles. No wheezes. No rhonchi. No dullness on percussion. Diminished breath sounds bilaterally  CV: Regular rate. Regular rhythm. No murmur or rub. No edema. Peripheral pulses are 2+. Capillary refill is less than 3 seconds. GI: Tender and distended, no rebound or guarding. No hernia. Skin: Warm and dry. No nodule on exposed extremities. Lymph: No cervical LAD. No supraclavicular LAD. M/S: No cyanosis. No joint deformity. No clubbing. Neuro: Awake. Alert. Moves all four extremities. Psych: Oriented x 3. No anxiety. LABS:  CBC:   Recent Labs     04/29/21  0544 04/30/21 0523 05/01/21  0552   WBC 10.0 10.9 7.1   HGB 8.5* 8.1* 8.2*   HCT 24.4* 23.8* 24.2*   MCV 98.9 99.2 100.1*    438 478*     BMP:   Recent Labs     04/29/21  0544 04/30/21  0523 05/01/21  0552   * 135* 138   K 3.1* 3.5 3.3*    98* 100   CO2 23 25 30   PHOS 1.6* 1.9* 2.7   BUN <2* <2* <2*   CREATININE <0.5* <0.5* <0.5*     LIVER PROFILE:   Recent Labs     04/29/21  0544 04/30/21  0523 05/01/21  0552   AST 85* 66* 53*   * 82* 69*   BILITOT 0.4 0.3 0.3   ALKPHOS 106 91 84     PT/INR: No results for input(s): PROTIME, INR in the last 72 hours. APTT: No results for input(s): APTT in the last 72 hours. UA:No results for input(s): NITRITE, COLORU, PHUR, LABCAST, WBCUA, RBCUA, MUCUS, TRICHOMONAS, YEAST, BACTERIA, CLARITYU, SPECGRAV, LEUKOCYTESUR, UROBILINOGEN, BILIRUBINUR, BLOODU, GLUCOSEU, AMORPHOUS in the last 72 hours. Invalid input(s): KETONESU  No results for input(s): PHART, FRG1UMK, PO2ART in the last 72 hours.     Chest imaging was reviewed by me and showed   5/1/2021 CXR was personally reviewed by me: In comparison with prior film, patient has developed small bilateral pleural effusions along with bibasilar atelectasis and interstitial edema. 4/27/2021 CT abdomen lung windows show moderate right and small left pleural effusion along with atelectasis. Microbiology: SARS-CoV-2 NAAT negative x2    ASSESSMENT:  · Acute hypoxemic respiratory failure  · Abnormal chest x-ray: Clinically this is all consistent with acute pancreatitis causing respiratory failure. Patient does not meet criteria for ARDS. However, close observation in the hospital is warranted. Clinical picture is not suggestive of Covid pneumonia. I feel his imaging is entirely explained by acute pancreatitis with interstitial edema and bilateral effusions, best seen on the CT imaging.   · Recurrent acute alcoholic pancreatitis  · Electrolytes disorder  · Ongoing alcohol abuse  · Ongoing tobacco abuse    PLAN:  · Supplemental oxygen to maintain SaO2 >92%; wean as tolerated    · Management of acute pancreatitis per internal medicine and gastroenterology  · Agree with recommendations for alcohol and tobacco cessation  · Prophylaxis with Lovenox, monitoring H&H

## 2021-05-02 LAB
A/G RATIO: 0.8 (ref 1.1–2.2)
ALBUMIN SERPL-MCNC: 2.4 G/DL (ref 3.4–5)
ALP BLD-CCNC: 89 U/L (ref 40–129)
ALT SERPL-CCNC: 59 U/L (ref 10–40)
ANION GAP SERPL CALCULATED.3IONS-SCNC: 10 MMOL/L (ref 3–16)
AST SERPL-CCNC: 39 U/L (ref 15–37)
BILIRUB SERPL-MCNC: 0.4 MG/DL (ref 0–1)
BUN BLDV-MCNC: <2 MG/DL (ref 7–20)
CALCIUM SERPL-MCNC: 8.6 MG/DL (ref 8.3–10.6)
CHLORIDE BLD-SCNC: 100 MMOL/L (ref 99–110)
CO2: 27 MMOL/L (ref 21–32)
CREAT SERPL-MCNC: <0.5 MG/DL (ref 0.9–1.3)
GFR AFRICAN AMERICAN: >60
GFR NON-AFRICAN AMERICAN: >60
GLOBULIN: 3 G/DL
GLUCOSE BLD-MCNC: 117 MG/DL (ref 70–99)
GLUCOSE BLD-MCNC: 120 MG/DL (ref 70–99)
GLUCOSE BLD-MCNC: 122 MG/DL (ref 70–99)
GLUCOSE BLD-MCNC: 96 MG/DL (ref 70–99)
GLUCOSE BLD-MCNC: 97 MG/DL (ref 70–99)
HCT VFR BLD CALC: 25.8 % (ref 40.5–52.5)
HEMOGLOBIN: 8.9 G/DL (ref 13.5–17.5)
MCH RBC QN AUTO: 34.3 PG (ref 26–34)
MCHC RBC AUTO-ENTMCNC: 34.6 G/DL (ref 31–36)
MCV RBC AUTO: 99.3 FL (ref 80–100)
PDW BLD-RTO: 16.2 % (ref 12.4–15.4)
PERFORMED ON: ABNORMAL
PERFORMED ON: ABNORMAL
PERFORMED ON: NORMAL
PERFORMED ON: NORMAL
PHOSPHORUS: 2.4 MG/DL (ref 2.5–4.9)
PLATELET # BLD: 577 K/UL (ref 135–450)
PMV BLD AUTO: 7.6 FL (ref 5–10.5)
POTASSIUM REFLEX MAGNESIUM: 3.7 MMOL/L (ref 3.5–5.1)
RBC # BLD: 2.6 M/UL (ref 4.2–5.9)
SODIUM BLD-SCNC: 137 MMOL/L (ref 136–145)
TOTAL PROTEIN: 5.4 G/DL (ref 6.4–8.2)
WBC # BLD: 10.6 K/UL (ref 4–11)

## 2021-05-02 PROCEDURE — 99232 SBSQ HOSP IP/OBS MODERATE 35: CPT | Performed by: INTERNAL MEDICINE

## 2021-05-02 PROCEDURE — 36415 COLL VENOUS BLD VENIPUNCTURE: CPT

## 2021-05-02 PROCEDURE — 94761 N-INVAS EAR/PLS OXIMETRY MLT: CPT

## 2021-05-02 PROCEDURE — 80053 COMPREHEN METABOLIC PANEL: CPT

## 2021-05-02 PROCEDURE — 6370000000 HC RX 637 (ALT 250 FOR IP): Performed by: INTERNAL MEDICINE

## 2021-05-02 PROCEDURE — 2580000003 HC RX 258: Performed by: INTERNAL MEDICINE

## 2021-05-02 PROCEDURE — 1200000000 HC SEMI PRIVATE

## 2021-05-02 PROCEDURE — 84100 ASSAY OF PHOSPHORUS: CPT

## 2021-05-02 PROCEDURE — 85027 COMPLETE CBC AUTOMATED: CPT

## 2021-05-02 PROCEDURE — 2700000000 HC OXYGEN THERAPY PER DAY

## 2021-05-02 PROCEDURE — 6360000002 HC RX W HCPCS: Performed by: INTERNAL MEDICINE

## 2021-05-02 RX ADMIN — OXYCODONE HYDROCHLORIDE 10 MG: 5 TABLET ORAL at 13:07

## 2021-05-02 RX ADMIN — QUETIAPINE FUMARATE 200 MG: 200 TABLET ORAL at 13:08

## 2021-05-02 RX ADMIN — CHLORDIAZEPOXIDE HYDROCHLORIDE 10 MG: 10 CAPSULE ORAL at 09:22

## 2021-05-02 RX ADMIN — CHLORDIAZEPOXIDE HYDROCHLORIDE 10 MG: 10 CAPSULE ORAL at 13:09

## 2021-05-02 RX ADMIN — OXYCODONE HYDROCHLORIDE 10 MG: 5 TABLET ORAL at 17:20

## 2021-05-02 RX ADMIN — ACETAMINOPHEN 650 MG: 325 TABLET ORAL at 04:37

## 2021-05-02 RX ADMIN — CLONIDINE HYDROCHLORIDE 0.1 MG: 0.1 TABLET ORAL at 13:07

## 2021-05-02 RX ADMIN — CLONIDINE HYDROCHLORIDE 0.1 MG: 0.1 TABLET ORAL at 09:23

## 2021-05-02 RX ADMIN — QUETIAPINE FUMARATE 200 MG: 200 TABLET ORAL at 20:46

## 2021-05-02 RX ADMIN — Medication 100 MG: at 09:22

## 2021-05-02 RX ADMIN — CLONIDINE HYDROCHLORIDE 0.1 MG: 0.1 TABLET ORAL at 20:46

## 2021-05-02 RX ADMIN — OXYCODONE HYDROCHLORIDE 10 MG: 5 TABLET ORAL at 09:23

## 2021-05-02 RX ADMIN — MULTIPLE VITAMINS W/ MINERALS TAB 1 TABLET: TAB at 09:22

## 2021-05-02 RX ADMIN — ACETAMINOPHEN 650 MG: 325 TABLET ORAL at 13:22

## 2021-05-02 RX ADMIN — PENTOXIFYLLINE 400 MG: 400 TABLET, FILM COATED, EXTENDED RELEASE ORAL at 13:08

## 2021-05-02 RX ADMIN — PANCRELIPASE 72000 UNITS: 24000; 76000; 120000 CAPSULE, DELAYED RELEASE PELLETS ORAL at 13:07

## 2021-05-02 RX ADMIN — PENTOXIFYLLINE 400 MG: 400 TABLET, FILM COATED, EXTENDED RELEASE ORAL at 09:22

## 2021-05-02 RX ADMIN — PANTOPRAZOLE SODIUM 40 MG: 40 TABLET, DELAYED RELEASE ORAL at 17:21

## 2021-05-02 RX ADMIN — CHLORDIAZEPOXIDE HYDROCHLORIDE 10 MG: 10 CAPSULE ORAL at 20:46

## 2021-05-02 RX ADMIN — PENTOXIFYLLINE 400 MG: 400 TABLET, FILM COATED, EXTENDED RELEASE ORAL at 17:20

## 2021-05-02 RX ADMIN — PANCRELIPASE 72000 UNITS: 24000; 76000; 120000 CAPSULE, DELAYED RELEASE PELLETS ORAL at 17:20

## 2021-05-02 RX ADMIN — OXYCODONE HYDROCHLORIDE 10 MG: 5 TABLET ORAL at 20:46

## 2021-05-02 RX ADMIN — PANCRELIPASE 72000 UNITS: 24000; 76000; 120000 CAPSULE, DELAYED RELEASE PELLETS ORAL at 09:22

## 2021-05-02 RX ADMIN — PANTOPRAZOLE SODIUM 40 MG: 40 TABLET, DELAYED RELEASE ORAL at 09:27

## 2021-05-02 RX ADMIN — MEROPENEM 1000 MG: 1 INJECTION, POWDER, FOR SOLUTION INTRAVENOUS at 01:34

## 2021-05-02 RX ADMIN — QUETIAPINE FUMARATE 200 MG: 200 TABLET ORAL at 09:22

## 2021-05-02 RX ADMIN — MEROPENEM 1000 MG: 1 INJECTION, POWDER, FOR SOLUTION INTRAVENOUS at 09:21

## 2021-05-02 RX ADMIN — MEROPENEM 1000 MG: 1 INJECTION, POWDER, FOR SOLUTION INTRAVENOUS at 17:21

## 2021-05-02 ASSESSMENT — PAIN DESCRIPTION - ORIENTATION: ORIENTATION: MID

## 2021-05-02 ASSESSMENT — PAIN SCALES - GENERAL
PAINLEVEL_OUTOF10: 8
PAINLEVEL_OUTOF10: 8
PAINLEVEL_OUTOF10: 9
PAINLEVEL_OUTOF10: 3

## 2021-05-02 ASSESSMENT — PAIN DESCRIPTION - DESCRIPTORS: DESCRIPTORS: STABBING

## 2021-05-02 ASSESSMENT — PAIN DESCRIPTION - ONSET: ONSET: ON-GOING

## 2021-05-02 ASSESSMENT — PAIN DESCRIPTION - PAIN TYPE: TYPE: ACUTE PAIN

## 2021-05-02 NOTE — PLAN OF CARE
Problem: Pain:  Goal: Pain level will decrease  Description: Pain level will decrease  5/2/2021 1058 by Caitlyn Robledo RN  Outcome: Ongoing  5/2/2021 1057 by Caitlyn Robledo RN  Outcome: Ongoing  Goal: Control of acute pain  Description: Control of acute pain  5/2/2021 1058 by Caitlyn Robledo RN  Outcome: Ongoing  5/2/2021 1057 by Caitlyn Robledo RN  Outcome: Ongoing  Goal: Control of chronic pain  Description: Control of chronic pain  5/2/2021 1058 by Caitlyn Robledo RN  Outcome: Ongoing  5/2/2021 1057 by Caitlyn Robledo RN  Outcome: Ongoing  Goal: Patient's pain/discomfort is manageable  Description: Patient's pain/discomfort is manageable  5/2/2021 1058 by Caitlyn Robledo RN  Outcome: Ongoing  5/2/2021 1057 by Caitlyn Robledo RN  Outcome: Ongoing     Problem: Infection:  Goal: Will remain free from infection  Description: Will remain free from infection  5/2/2021 1058 by Caitlyn Robledo RN  Outcome: Ongoing  5/2/2021 1057 by Caitlyn Robledo RN  Outcome: Ongoing     Problem: Safety:  Goal: Free from accidental physical injury  Description: Free from accidental physical injury  5/2/2021 1058 by Caitlyn Robledo RN  Outcome: Ongoing  5/2/2021 1057 by Caitlyn Robledo RN  Outcome: Ongoing  Goal: Free from intentional harm  Description: Free from intentional harm  5/2/2021 1058 by Caitlyn Robledo RN  Outcome: Ongoing  5/2/2021 1057 by Caitlyn Robledo RN  Outcome: Ongoing     Problem: Daily Care:  Goal: Daily care needs are met  Description: Daily care needs are met  5/2/2021 1058 by Caitlyn Robledo RN  Outcome: Ongoing  5/2/2021 1057 by Caitlyn Robledo RN  Outcome: Ongoing     Problem: Skin Integrity:  Goal: Skin integrity will stabilize  Description: Skin integrity will stabilize  5/2/2021 1058 by Caitlyn Robledo RN  Outcome: Ongoing  5/2/2021 1057 by Caitlyn Robledo RN  Outcome: Ongoing     Problem: Discharge Planning:  Goal: Patients continuum of care needs are met  Description: Patients continuum of care needs are met  5/2/2021 1058 by Ashok Shaw RN  Outcome: Ongoing  5/2/2021 1057 by Ashok Shaw RN  Outcome: Ongoing     Problem: Nutrition  Goal: Optimal nutrition therapy  5/2/2021 1058 by Ashok Shaw RN  Outcome: Ongoing  5/2/2021 1057 by Ashok Shaw RN  Outcome: Ongoing  Goal: Understanding of nutritional guidelines  5/2/2021 1058 by Ashok Shaw RN  Outcome: Ongoing  5/2/2021 1057 by Ashok Shaw RN  Outcome: Ongoing

## 2021-05-02 NOTE — PROGRESS NOTES
Pulmonary Progress Note  CC: Hypoxemia and pancreatitis    Subjective: Less abdominal pain, breathing is comfortable    IV line peripheral    EXAM:   /79   Pulse 99   Temp 97.2 °F (36.2 °C) (Oral)   Resp 16   Ht 6' (1.829 m)   Wt 178 lb 11.2 oz (81.1 kg)   SpO2 96%   BMI 24.24 kg/m²  on 3.5 L  Constitutional:  No acute distress   HEENT: no scleral icterus  Neck: No tracheal deviation present. Cardiovascular: Normal heart sounds. Pulmonary/Chest: No wheezes. No rhonchi. No rales. + decreased breath sounds. No accessory muscle usage or stridor. Abdominal: Soft. Musculoskeletal: No cyanosis. No clubbing. Skin: Skin is warm and dry.      Scheduled Meds:   chlordiazePOXIDE  10 mg Oral TID    magnesium sulfate  1,000 mg Intravenous Once    enoxaparin  40 mg Subcutaneous Daily    pantoprazole  40 mg Oral BID AC    lipase-protease-amylase  72,000 Units Oral TID WC    meropenem  1,000 mg Intravenous Q8H    oxyCODONE  10 mg Oral 4x Daily    pentoxifylline  400 mg Oral TID WC    cloNIDine  0.1 mg Oral TID    sodium chloride flush  5-40 mL Intravenous 2 times per day    thiamine  100 mg Oral Daily    multivitamin  1 tablet Oral Daily    QUEtiapine  200 mg Oral TID    nicotine  1 patch Transdermal Daily     Continuous Infusions:   dextrose      sodium chloride 25 mL (04/24/21 0059)    sodium chloride 75 mL/hr at 05/01/21 2028     PRN Meds:  glucose, dextrose, glucagon (rDNA), dextrose, oxyCODONE, labetalol, sodium chloride flush, sodium chloride, acetaminophen, promethazine **OR** ondansetron, potassium chloride **OR** potassium alternative oral replacement **OR** potassium chloride, magnesium sulfate    Labs:  CBC:   Recent Labs     04/30/21 0523 05/01/21  0552 05/02/21  0611   WBC 10.9 7.1 10.6   HGB 8.1* 8.2* 8.9*   HCT 23.8* 24.2* 25.8*   MCV 99.2 100.1* 99.3    478* 577*     BMP:   Recent Labs     04/30/21 0523 05/01/21  0552 05/02/21  0611   * 138 137   K 3.5 3.3* 3.7 CL 98* 100 100   CO2 25 30 27   PHOS 1.9* 2.7 2.4*   BUN <2* <2* <2*   CREATININE <0.5* <0.5* <0.5*     5/1/2021 CXR was personally reviewed by me: In comparison with prior film, patient has developed small bilateral pleural effusions along with bibasilar atelectasis and interstitial edema. 4/27/2021 CT abdomen lung windows show moderate right and small left pleural effusion along with atelectasis. Microbiology: SARS-CoV-2 NAAT negative x2    ASSESSMENT:  · Acute hypoxemic respiratory failure  · Abnormal chest x-ray: Clinically this is all consistent with acute pancreatitis causing respiratory failure. Patient does not meet criteria for ARDS. However, close observation in the hospital is warranted. Clinical picture is not suggestive of Covid pneumonia. I feel his imaging is entirely explained by acute pancreatitis with interstitial edema and bilateral effusions, best seen on the CT imaging. · Recurrent acute alcoholic pancreatitis  · Electrolytes disorder  · Ongoing alcohol abuse  · Ongoing tobacco abuse    PLAN:  · Supplemental oxygen to maintain SaO2 >92%; wean as tolerated    · Management of acute pancreatitis per internal medicine and gastroenterology  · DC IV fluids, consider diuresis if not autodiuresing.   · Agree with recommendations for alcohol and tobacco cessation  · Prophylaxis with Lovenox, monitoring H&H

## 2021-05-02 NOTE — FLOWSHEET NOTE
05/01/21 2003   Vital Signs   Temp 101.2 °F (38.4 °C)   Temp Source Oral   Pulse 113   Heart Rate Source Monitor   Resp 16   /75   BP Location Right upper arm   Patient Position Sitting   Level of Consciousness Alert (0)   MEWS Score 3   Oxygen Therapy   SpO2 96 %   O2 Device Nasal cannula   O2 Flow Rate (L/min) 3.5 L/min   Pt given tylenol 2 po for a fever. Cont pulse ox at bedside.  Kitty Mares

## 2021-05-02 NOTE — FLOWSHEET NOTE
05/02/21 0303   Vital Signs   Temp 97.1 °F (36.2 °C)   Temp Source Oral   Pulse 101   Heart Rate Source Monitor   Resp 16   /74   BP Location Right upper arm   Patient Position High fowlers   Level of Consciousness Alert (0)   MEWS Score 2   Oxygen Therapy   SpO2 97 %   O2 Device Nasal cannula   O2 Flow Rate (L/min) 3.5 L/min   Pt resting in bed, no acute distress.  Dorethea Peeling

## 2021-05-02 NOTE — PROGRESS NOTES
Pt called writer to room. Pt's IV was hanging out of his arm, pt had sat on his 02. 86% on room air. Reapplied 02 at this time.  Radha Gallegos

## 2021-05-02 NOTE — PROGRESS NOTES
Report given and care transferred to [facility], [address] [facility].

## 2021-05-02 NOTE — PROGRESS NOTES
Hospitalist Progress Note      PCP: Fili Can MD    Date of Admission: 4/20/2021    Subjective: Horace Lieberman states he feels much better, still with abd pain. tolerating clears  - + BM this am   - remains hypoxic, stable on 3L this AM  - rapid COVID neg x 2    Medications:  Reviewed    Infusion Medications    dextrose      sodium chloride 25 mL (04/24/21 0059)     Scheduled Medications    chlordiazePOXIDE  10 mg Oral TID    magnesium sulfate  1,000 mg Intravenous Once    enoxaparin  40 mg Subcutaneous Daily    pantoprazole  40 mg Oral BID AC    lipase-protease-amylase  72,000 Units Oral TID WC    meropenem  1,000 mg Intravenous Q8H    oxyCODONE  10 mg Oral 4x Daily    pentoxifylline  400 mg Oral TID WC    cloNIDine  0.1 mg Oral TID    sodium chloride flush  5-40 mL Intravenous 2 times per day    thiamine  100 mg Oral Daily    multivitamin  1 tablet Oral Daily    QUEtiapine  200 mg Oral TID    nicotine  1 patch Transdermal Daily     PRN Meds: glucose, dextrose, glucagon (rDNA), dextrose, oxyCODONE, labetalol, sodium chloride flush, sodium chloride, acetaminophen, promethazine **OR** ondansetron, potassium chloride **OR** potassium alternative oral replacement **OR** potassium chloride, magnesium sulfate      Intake/Output Summary (Last 24 hours) at 5/2/2021 1004  Last data filed at 5/2/2021 0959  Gross per 24 hour   Intake 420 ml   Output --   Net 420 ml       Physical Exam Performed:    /79   Pulse 99   Temp 97.2 °F (36.2 °C) (Oral)   Resp 16   Ht 6' (1.829 m)   Wt 178 lb 11.2 oz (81.1 kg)   SpO2 96%   BMI 24.24 kg/m²     Gen: Disheveled appearing male. No distress. Alert. Eyes: PERRL. No sclera icterus. No conjunctival injection. ENT: No discharge. Pharynx clear. Neck: No JVD. Trachea midline. Resp: No accessory muscle use. No crackles. No wheezes. No rhonchi. Diminished breath sounds bilaterally, more so in the bases   CV: Tachycardic rate. Regular rhythm. No murmur. No rub.  No edema.   GI: Mildly distended abdomen with tender epigastrium. BS active   Skin: Warm and dry. No nodule on exposed extremities. No rash on exposed extremities. M/S: No cyanosis. No joint deformity. No clubbing. Neuro: Awake. Grossly nonfocal    Psych: Oriented x 3. No anxiety or agitation. Labs:   Recent Labs     04/30/21 0523 05/01/21  0552 05/02/21  0611   WBC 10.9 7.1 10.6   HGB 8.1* 8.2* 8.9*   HCT 23.8* 24.2* 25.8*    478* 577*     Recent Labs     04/30/21 0523 05/01/21  0552 05/02/21  0611   * 138 137   K 3.5 3.3* 3.7   CL 98* 100 100   CO2 25 30 27   BUN <2* <2* <2*   CREATININE <0.5* <0.5* <0.5*   CALCIUM 8.2* 8.5 8.6   PHOS 1.9* 2.7 2.4*     Recent Labs     04/30/21 0523 05/01/21  0552 05/02/21  0611   AST 66* 53* 39*   ALT 82* 69* 59*   BILITOT 0.3 0.3 0.4   ALKPHOS 91 84 89     No results for input(s): INR in the last 72 hours. No results for input(s): [place] in the last 72 hours. Urinalysis:      Lab Results   Component Value Date    NITRU Negative 04/23/2021    WBCUA 0-2 04/23/2021    BACTERIA 1+ 04/23/2021    RBCUA 3-4 04/23/2021    BLOODU MODERATE 04/23/2021    SPECGRAV >=1.030 04/23/2021    GLUCOSEU Negative 04/23/2021       Radiology:  XR CHEST (2 VW)   Final Result   1. Small bilateral pleural effusions. 2. Patchy bilateral subpleural opacities raising concern for an   atypical/viral pneumonia. CT ABDOMEN PELVIS W IV CONTRAST Additional Contrast? None   Final Result   Severe acute pancreatitis which has significantly worsened since 04/21/2021. Focal areas of the pancreas which do not enhance and are worrisome for   pancreatic necrosis. Suspected developing pseudocyst in the magalie hepatis. Large amount of inflammatory ascites in the upper abdomen and left pericolic   gutter extending into the pelvis. Feeding tube in place. IR GI TUBE W FLUOROSCOPY   Final Result   Successful placement of a post pyloric feeding tube.   The tube was secured at   the 105 cm natty. IR GI TUBE W FLUOROSCOPY   Final Result   Successful placement of a post pyloric feeding tube. The tube was secured to   the nose at the 95 cm natty         CT ABDOMEN PELVIS W IV CONTRAST Additional Contrast? None   Final Result   Acute pancreatitis with questionable developing necrosis in the pancreatic   tail. Fluid collection abutting the inferior aspect of the pancreatic head likely a   pseudocyst or abscess. Marked diffuse hepatic steatosis. Vicky Noonan have reviewed the chart on [facility] and personally interviewed and examined patient, reviewed the data (labs and imaging) and after discussion with my PA formulated the plan. Agree with note with the following edits. HPI:     Having some epigastric pain. Is hypoxic. He is a smoker. No chest pain. He is a smoker. 5/2-much improved with regards to abdominal pain. Still on 3 L of oxygen. I reviewed the patient's Past Medical History, Past Surgical History, Medications, and Allergies. Physical exam:    /79   Pulse 99   Temp 97.2 °F (36.2 °C) (Oral)   Resp 16   Ht 6' (1.829 m)   Wt 178 lb 11.2 oz (81.1 kg)   SpO2 96%   BMI 24.24 kg/m²     Gen: No distress. Alert. Eyes: PERRL. No sclera icterus. No conjunctival injection. ENT: No discharge. Pharynx clear. Neck: Trachea midline. Normal thyroid. Resp: No accessory muscle use. No crackles. No wheezes. No rhonchi. No dullness on percussion. CV: Regular rate. Regular rhythm. No murmur or rub. No edema. GI: epigastric tenderness. Non-distended. No masses. No organomegaly. Normal bowel sounds. No hernia.      Assessment/Plan:    #Acute Recurrent Alcoholic Pancreatitis with Poss Abscess  - CT showed acute pancreatitis with questionable developing necrosis of the pancreatic tail with fluid collection abutting the inferior aspect of the pancreatic head  - s/p EUS with FNA on 8/20 which was neg for malignant cells with features suggestive of fat necrosis; f/w Dr. Indy Ocasio- consulted - added pentoxifylline   - aggressive IVF and NPO started on admission  - Persistent fevers- sp 6 days of zosyn. Repeated CT on 4/27 which showed worsening pancreatitis with necrosis. Now on Merrem D#5  - NJ tube placed x 2 and received post pyloric feeds x 1 day, but tube came out again on 4/28. Now on clear liquids and tolerating well   - creon added  - abstinence from alcohol strongly advised      #Hypoxia   - patient was 80% on RA . CXR with bilateral effusions and subpleural opacities   - suspect related to acute pancreatitis, not meeting criteria for ARDS   - he has had rapid COVID 19 x 2 since admission which are both negative  - started IS  - closely monitor- continuous pulse oximetry   - pulmonary consultation- appreciate recs     #Leukocytosis - Resolved  - likely 2/2 above  - trended down     #Hypokalemia, Hypophosphatemia and hypomagnesemia  - cont to replete aggressively     #Hepatic Steatosis  #Elevated LFTs  - noted on CT abdomen  - LFTs trending down, monitor  - encouraged  alcohol cessation     #Alcohol Abuse  #Alcohol Withdrawal- resolved   - last drink was 4/20  - fall and seizure protocol  - cont [facility] Librium-> decr dose today 5/1 to 10 mg TID and then stop after 3 more days   - sp CIWA - now off      #Tachycardia - improved   - Cont clonidine  - tachycardia likely from alcohol withdrawal syndrome & pancreatitis     #Hyponatremia - resolved   - monitor with IV hydration  - Improved; sodium is up to 138 today      #Normocytic anemia   - Hb 17 on admit (suspect with dehydration)-> 8 and remaining stable at 8 for the past few days  - no apparent bleeding  - iron studies with elevated ferritin, but low Iron      DVT Prophylaxis: Lovenox.   Diet: DIET CLEAR LIQUID;  Dietary Nutrition Supplements: Clear Liquid Oral Supplement  Code Status: Full Code    Kay Garcia PA-C  5/2/2021 10:04 AM      COLIN Gil 5/2/2021

## 2021-05-03 LAB
A/G RATIO: 0.7 (ref 1.1–2.2)
ALBUMIN SERPL-MCNC: 2.2 G/DL (ref 3.4–5)
ALP BLD-CCNC: 81 U/L (ref 40–129)
ALT SERPL-CCNC: 44 U/L (ref 10–40)
ANION GAP SERPL CALCULATED.3IONS-SCNC: 10 MMOL/L (ref 3–16)
AST SERPL-CCNC: 27 U/L (ref 15–37)
BILIRUB SERPL-MCNC: 0.3 MG/DL (ref 0–1)
BUN BLDV-MCNC: <2 MG/DL (ref 7–20)
CALCIUM SERPL-MCNC: 8.7 MG/DL (ref 8.3–10.6)
CHLORIDE BLD-SCNC: 99 MMOL/L (ref 99–110)
CO2: 27 MMOL/L (ref 21–32)
CREAT SERPL-MCNC: <0.5 MG/DL (ref 0.9–1.3)
GFR AFRICAN AMERICAN: >60
GFR NON-AFRICAN AMERICAN: >60
GLOBULIN: 3 G/DL
GLUCOSE BLD-MCNC: 100 MG/DL (ref 70–99)
GLUCOSE BLD-MCNC: 105 MG/DL (ref 70–99)
GLUCOSE BLD-MCNC: 114 MG/DL (ref 70–99)
GLUCOSE BLD-MCNC: 119 MG/DL (ref 70–99)
GLUCOSE BLD-MCNC: 119 MG/DL (ref 70–99)
HCT VFR BLD CALC: 23.6 % (ref 40.5–52.5)
HEMOGLOBIN: 8 G/DL (ref 13.5–17.5)
MAGNESIUM: 1.9 MG/DL (ref 1.8–2.4)
MCH RBC QN AUTO: 33.8 PG (ref 26–34)
MCHC RBC AUTO-ENTMCNC: 34 G/DL (ref 31–36)
MCV RBC AUTO: 99.5 FL (ref 80–100)
PDW BLD-RTO: 16 % (ref 12.4–15.4)
PERFORMED ON: ABNORMAL
PHOSPHORUS: 3.2 MG/DL (ref 2.5–4.9)
PLATELET # BLD: 624 K/UL (ref 135–450)
PMV BLD AUTO: 7.5 FL (ref 5–10.5)
POTASSIUM REFLEX MAGNESIUM: 3.1 MMOL/L (ref 3.5–5.1)
RBC # BLD: 2.37 M/UL (ref 4.2–5.9)
SODIUM BLD-SCNC: 136 MMOL/L (ref 136–145)
TOTAL PROTEIN: 5.2 G/DL (ref 6.4–8.2)
WBC # BLD: 7.6 K/UL (ref 4–11)

## 2021-05-03 PROCEDURE — 2700000000 HC OXYGEN THERAPY PER DAY

## 2021-05-03 PROCEDURE — 6360000002 HC RX W HCPCS: Performed by: INTERNAL MEDICINE

## 2021-05-03 PROCEDURE — 97116 GAIT TRAINING THERAPY: CPT

## 2021-05-03 PROCEDURE — 6370000000 HC RX 637 (ALT 250 FOR IP): Performed by: INTERNAL MEDICINE

## 2021-05-03 PROCEDURE — 85027 COMPLETE CBC AUTOMATED: CPT

## 2021-05-03 PROCEDURE — 1200000000 HC SEMI PRIVATE

## 2021-05-03 PROCEDURE — 6370000000 HC RX 637 (ALT 250 FOR IP): Performed by: PHYSICIAN ASSISTANT

## 2021-05-03 PROCEDURE — 80053 COMPREHEN METABOLIC PANEL: CPT

## 2021-05-03 PROCEDURE — 99233 SBSQ HOSP IP/OBS HIGH 50: CPT | Performed by: INTERNAL MEDICINE

## 2021-05-03 PROCEDURE — 2580000003 HC RX 258: Performed by: PHYSICIAN ASSISTANT

## 2021-05-03 PROCEDURE — 97166 OT EVAL MOD COMPLEX 45 MIN: CPT

## 2021-05-03 PROCEDURE — 97530 THERAPEUTIC ACTIVITIES: CPT

## 2021-05-03 PROCEDURE — 97167 OT EVAL HIGH COMPLEX 60 MIN: CPT

## 2021-05-03 PROCEDURE — 97161 PT EVAL LOW COMPLEX 20 MIN: CPT

## 2021-05-03 PROCEDURE — 36415 COLL VENOUS BLD VENIPUNCTURE: CPT

## 2021-05-03 PROCEDURE — 84100 ASSAY OF PHOSPHORUS: CPT

## 2021-05-03 PROCEDURE — 83735 ASSAY OF MAGNESIUM: CPT

## 2021-05-03 PROCEDURE — 99232 SBSQ HOSP IP/OBS MODERATE 35: CPT | Performed by: INTERNAL MEDICINE

## 2021-05-03 PROCEDURE — 2580000003 HC RX 258: Performed by: INTERNAL MEDICINE

## 2021-05-03 PROCEDURE — 94761 N-INVAS EAR/PLS OXIMETRY MLT: CPT

## 2021-05-03 RX ORDER — CHLORDIAZEPOXIDE HYDROCHLORIDE 5 MG/1
5 CAPSULE, GELATIN COATED ORAL 3 TIMES DAILY
Status: DISCONTINUED | OUTPATIENT
Start: 2021-05-04 | End: 2021-05-05

## 2021-05-03 RX ORDER — LIDOCAINE 4 G/G
1 PATCH TOPICAL DAILY
Status: DISCONTINUED | OUTPATIENT
Start: 2021-05-03 | End: 2021-05-07 | Stop reason: HOSPADM

## 2021-05-03 RX ORDER — POTASSIUM BICARBONATE 25 MEQ/1
25 TABLET, EFFERVESCENT ORAL ONCE
Status: DISCONTINUED | OUTPATIENT
Start: 2021-05-03 | End: 2021-05-03 | Stop reason: CLARIF

## 2021-05-03 RX ORDER — OXYCODONE HYDROCHLORIDE 5 MG/1
10 TABLET ORAL EVERY 4 HOURS PRN
Status: DISCONTINUED | OUTPATIENT
Start: 2021-05-03 | End: 2021-05-07 | Stop reason: HOSPADM

## 2021-05-03 RX ORDER — FUROSEMIDE 10 MG/ML
20 INJECTION INTRAMUSCULAR; INTRAVENOUS ONCE
Status: COMPLETED | OUTPATIENT
Start: 2021-05-03 | End: 2021-05-03

## 2021-05-03 RX ORDER — OXYCODONE HYDROCHLORIDE 5 MG/1
5 TABLET ORAL EVERY 4 HOURS PRN
Status: DISCONTINUED | OUTPATIENT
Start: 2021-05-03 | End: 2021-05-07 | Stop reason: HOSPADM

## 2021-05-03 RX ADMIN — POTASSIUM CHLORIDE 40 MEQ: 1500 TABLET, EXTENDED RELEASE ORAL at 08:58

## 2021-05-03 RX ADMIN — CLONIDINE HYDROCHLORIDE 0.1 MG: 0.1 TABLET ORAL at 13:17

## 2021-05-03 RX ADMIN — CHLORDIAZEPOXIDE HYDROCHLORIDE 10 MG: 10 CAPSULE ORAL at 22:05

## 2021-05-03 RX ADMIN — OXYCODONE HYDROCHLORIDE 10 MG: 5 TABLET ORAL at 17:09

## 2021-05-03 RX ADMIN — FUROSEMIDE 20 MG: 10 INJECTION, SOLUTION INTRAMUSCULAR; INTRAVENOUS at 18:07

## 2021-05-03 RX ADMIN — QUETIAPINE FUMARATE 200 MG: 200 TABLET ORAL at 08:58

## 2021-05-03 RX ADMIN — CLONIDINE HYDROCHLORIDE 0.1 MG: 0.1 TABLET ORAL at 08:58

## 2021-05-03 RX ADMIN — CHLORDIAZEPOXIDE HYDROCHLORIDE 10 MG: 10 CAPSULE ORAL at 08:58

## 2021-05-03 RX ADMIN — MEROPENEM 1000 MG: 1 INJECTION, POWDER, FOR SOLUTION INTRAVENOUS at 01:32

## 2021-05-03 RX ADMIN — PANCRELIPASE 72000 UNITS: 24000; 76000; 120000 CAPSULE, DELAYED RELEASE PELLETS ORAL at 12:40

## 2021-05-03 RX ADMIN — QUETIAPINE FUMARATE 200 MG: 200 TABLET ORAL at 22:05

## 2021-05-03 RX ADMIN — ACETAMINOPHEN 650 MG: 325 TABLET ORAL at 03:59

## 2021-05-03 RX ADMIN — MEROPENEM 1000 MG: 1 INJECTION, POWDER, FOR SOLUTION INTRAVENOUS at 17:09

## 2021-05-03 RX ADMIN — PENTOXIFYLLINE 400 MG: 400 TABLET, FILM COATED, EXTENDED RELEASE ORAL at 09:58

## 2021-05-03 RX ADMIN — PANCRELIPASE 72000 UNITS: 24000; 76000; 120000 CAPSULE, DELAYED RELEASE PELLETS ORAL at 09:58

## 2021-05-03 RX ADMIN — MEROPENEM 1000 MG: 1 INJECTION, POWDER, FOR SOLUTION INTRAVENOUS at 08:58

## 2021-05-03 RX ADMIN — CHLORDIAZEPOXIDE HYDROCHLORIDE 10 MG: 10 CAPSULE ORAL at 13:16

## 2021-05-03 RX ADMIN — OXYCODONE HYDROCHLORIDE 10 MG: 5 TABLET ORAL at 12:40

## 2021-05-03 RX ADMIN — ACETAMINOPHEN 650 MG: 325 TABLET ORAL at 17:30

## 2021-05-03 RX ADMIN — Medication 10 ML: at 22:05

## 2021-05-03 RX ADMIN — POTASSIUM BICARBONATE 20 MEQ: 782 TABLET, EFFERVESCENT ORAL at 18:07

## 2021-05-03 RX ADMIN — PENTOXIFYLLINE 400 MG: 400 TABLET, FILM COATED, EXTENDED RELEASE ORAL at 17:10

## 2021-05-03 RX ADMIN — OXYCODONE HYDROCHLORIDE 10 MG: 5 TABLET ORAL at 08:57

## 2021-05-03 RX ADMIN — PANTOPRAZOLE SODIUM 40 MG: 40 TABLET, DELAYED RELEASE ORAL at 08:58

## 2021-05-03 RX ADMIN — Medication 100 MG: at 08:58

## 2021-05-03 RX ADMIN — PANCRELIPASE 72000 UNITS: 24000; 76000; 120000 CAPSULE, DELAYED RELEASE PELLETS ORAL at 17:10

## 2021-05-03 RX ADMIN — PANTOPRAZOLE SODIUM 40 MG: 40 TABLET, DELAYED RELEASE ORAL at 17:09

## 2021-05-03 RX ADMIN — ENOXAPARIN SODIUM 40 MG: 40 INJECTION SUBCUTANEOUS at 09:00

## 2021-05-03 RX ADMIN — QUETIAPINE FUMARATE 200 MG: 200 TABLET ORAL at 13:17

## 2021-05-03 RX ADMIN — CLONIDINE HYDROCHLORIDE 0.1 MG: 0.1 TABLET ORAL at 22:05

## 2021-05-03 RX ADMIN — PENTOXIFYLLINE 400 MG: 400 TABLET, FILM COATED, EXTENDED RELEASE ORAL at 13:16

## 2021-05-03 RX ADMIN — MULTIPLE VITAMINS W/ MINERALS TAB 1 TABLET: TAB at 08:58

## 2021-05-03 ASSESSMENT — PAIN SCALES - GENERAL
PAINLEVEL_OUTOF10: 10
PAINLEVEL_OUTOF10: 8
PAINLEVEL_OUTOF10: 3
PAINLEVEL_OUTOF10: 0

## 2021-05-03 NOTE — PROGRESS NOTES
Hospitalist Progress Note      PCP: Jovanni Rose MD    Date of Admission: 4/20/2021    Subjective: Chinedu Li continues to feel better. - tolerating low fat diet   - hypoxic to 78% overnight with O2 off. - When I saw him this morning he was wearing 3L NC with O2 sat of 97%. I turned him down to 2L and he was stable at 95%  - UOP documented is not accurate, patient had been emptying his own urinal       Medications:  Reviewed    Infusion Medications    dextrose      sodium chloride 25 mL (04/24/21 0059)     Scheduled Medications    chlordiazePOXIDE  10 mg Oral TID    magnesium sulfate  1,000 mg Intravenous Once    enoxaparin  40 mg Subcutaneous Daily    pantoprazole  40 mg Oral BID AC    lipase-protease-amylase  72,000 Units Oral TID WC    meropenem  1,000 mg Intravenous Q8H    oxyCODONE  10 mg Oral 4x Daily    pentoxifylline  400 mg Oral TID WC    cloNIDine  0.1 mg Oral TID    sodium chloride flush  5-40 mL Intravenous 2 times per day    thiamine  100 mg Oral Daily    multivitamin  1 tablet Oral Daily    QUEtiapine  200 mg Oral TID    nicotine  1 patch Transdermal Daily     PRN Meds: glucose, dextrose, glucagon (rDNA), dextrose, oxyCODONE, labetalol, sodium chloride flush, sodium chloride, acetaminophen, promethazine **OR** ondansetron, potassium chloride **OR** potassium alternative oral replacement **OR** potassium chloride, magnesium sulfate      Intake/Output Summary (Last 24 hours) at 5/3/2021 0958  Last data filed at 5/3/2021 0235  Gross per 24 hour   Intake 660 ml   Output 1650 ml   Net -990 ml       Physical Exam Performed:    BP (!) 132/90   Pulse 100   Temp 97 °F (36.1 °C) (Oral)   Resp 18   Ht 6' (1.829 m)   Wt 178 lb 11.2 oz (81.1 kg)   SpO2 95%   BMI 24.24 kg/m²     Gen: Disheveled appearing male. No distress. Alert. Eyes: PERRL. No sclera icterus. No conjunctival injection. ENT: No discharge. Pharynx clear. Neck: No JVD. Trachea midline. Resp: No accessory muscle use. No crackles. No wheezes. No rhonchi. Diminished breath sounds bilaterally, more so in the bases   CV: Tachycardic rate. Regular rhythm. No murmur. No rub. No edema. GI: Mildly distended abdomen with tender epigastrium. BS active   Skin: Warm and dry. No nodule on exposed extremities. No rash on exposed extremities. M/S: No cyanosis. No joint deformity. No clubbing. Neuro: Awake. Grossly nonfocal    Psych: Oriented x 3. No anxiety or agitation. Labs:   Recent Labs     05/01/21 0552 05/02/21 0611 05/03/21 0516   WBC 7.1 10.6 7.6   HGB 8.2* 8.9* 8.0*   HCT 24.2* 25.8* 23.6*   * 577* 624*     Recent Labs     05/01/21 0552 05/02/21 0611 05/03/21 0516    137 136   K 3.3* 3.7 3.1*    100 99   CO2 30 27 27   BUN <2* <2* <2*   CREATININE <0.5* <0.5* <0.5*   CALCIUM 8.5 8.6 8.7   PHOS 2.7 2.4* 3.2     Recent Labs     05/01/21 0552 05/02/21 0611 05/03/21 0516   AST 53* 39* 27   ALT 69* 59* 44*   BILITOT 0.3 0.4 0.3   ALKPHOS 84 89 81     No results for input(s): INR in the last 72 hours. No results for input(s): Chardarius Youngh in the last 72 hours. Urinalysis:      Lab Results   Component Value Date    NITRU Negative 04/23/2021    WBCUA 0-2 04/23/2021    BACTERIA 1+ 04/23/2021    RBCUA 3-4 04/23/2021    BLOODU MODERATE 04/23/2021    SPECGRAV >=1.030 04/23/2021    GLUCOSEU Negative 04/23/2021       Radiology:  XR CHEST (2 VW)   Final Result   1. Small bilateral pleural effusions. 2. Patchy bilateral subpleural opacities raising concern for an   atypical/viral pneumonia. CT ABDOMEN PELVIS W IV CONTRAST Additional Contrast? None   Final Result   Severe acute pancreatitis which has significantly worsened since 04/21/2021. Focal areas of the pancreas which do not enhance and are worrisome for   pancreatic necrosis. Suspected developing pseudocyst in the magalie hepatis.    Large amount of inflammatory ascites in the upper abdomen and left pericolic   gutter extending showed acute pancreatitis with questionable developing necrosis of the pancreatic tail with fluid collection abutting the inferior aspect of the pancreatic head  - s/p EUS with FNA on 8/20/20 which was neg for malignant cells with features suggestive of fat necrosis; f/w Dr. Elan Rodriguez- consulted - added pentoxifylline   - aggressive IVF and NPO started on admission  - Persistent fevers- sp 6 days of zosyn. Repeated CT on 4/27 which showed worsening pancreatitis with necrosis. Now on Merrem D#6  - NJ tube placed x 2 and received post pyloric feeds x 1 day, but tube came out again on 4/28. Now on low fat diet and tolerating well   - creon added  - change oxycodone from scheduled to PRN   - abstinence from alcohol strongly advised daily      #Hypoxia - improving   - patient was 80% on RA . CXR with bilateral effusions and subpleural opacities   - suspect related to acute pancreatitis, not meeting criteria for ARDS   - he has had rapid COVID 19 x 2 since admission which are both negative- I do not suspect COVID 19 as the cause   - started IS  - closely monitor- continuous pulse oximetry   - pulmonary consultation- appreciate recs     #Leukocytosis - Resolved  - likely 2/2 above  - trended down     #Hypokalemia, Hypophosphatemia and hypomagnesemia  - cont to replete aggressively     #Hepatic Steatosis  #Elevated LFTs  - noted on CT abdomen  - LFTs trending down, monitor  - encouraged  alcohol cessation     #Alcohol Abuse  #Alcohol Withdrawal- resolved   - last drink was 4/20  - fall and seizure protocol   - sp CIWA, now off.   Weaning off Librium now      #Tachycardia - improved   - Cont clonidine  - tachycardia likely from alcohol withdrawal syndrome & pancreatitis     #Hyponatremia - resolved   - monitor with IV hydration  - Improved; sodium is up to 138 today      #Normocytic anemia   - Hb 17 on admit (suspect with dehydration)-> 8 and remaining stable at 8 for the past few days  - no apparent bleeding  - iron studies with elevated ferritin, but low Iron      #Thrombocytosis  - suspect reactive, monitor CBC daily    DVT Prophylaxis: Lovenox.   Diet: Dietary Nutrition Supplements: Clear Liquid Oral Supplement  DIET LOW FAT;  Code Status: Full Code    Fede Chun PA-C  5/3/2021 9:58 AM        COLIN GUTIERREZ 5/3/2021 10:46 AM

## 2021-05-03 NOTE — PROGRESS NOTES
Patient c/o increased anxiety after therapy. RN offered patient lavender essential oil patch and he refused.

## 2021-05-03 NOTE — PROGRESS NOTES
3 Visits:  1). Bed to toilet/BSC: Modified Independent    To be met in 5 Visits:  1). Supine to/from Sit:  Independent  2). Upper Body Bathing:   Independent  3). Lower Body Bathing:   Supervision  4). Upper Body Dressing:  Independent  5). Lower Body Dressing:  Independent  6). Pt to demonstrate UE exs x 15 reps with minimal cues    Rehabilitation Potential:  Fair for goals listed above. Strengths for achieving goals include: Pt cooperative  Barriers to achieving goals include:  Complexity of condition     Plan: To be seen 2-3 x/wk  while in acute care setting for therapeutic exercises, bed mobility, transfers, dressing, bathing, family/patient education, ADL/IADL retraining, energy conservation training.      Imelda So OTR/L [id number]          If patient discharges from this facility prior to next visit, this note will serve as the Discharge Summary

## 2021-05-03 NOTE — PROGRESS NOTES
PROGRESS NOTE  S:45 yrs Patient  admitted on 4/20/2021 with Acute recurrent pancreatitis [K85.90] . Tolerating low fat diet    Current Hospital Schedued Meds   [START ON 5/4/2021] chlordiazePOXIDE  5 mg Oral TID    chlordiazePOXIDE  10 mg Oral TID    enoxaparin  40 mg Subcutaneous Daily    pantoprazole  40 mg Oral BID AC    lipase-protease-amylase  72,000 Units Oral TID WC    meropenem  1,000 mg Intravenous Q8H    pentoxifylline  400 mg Oral TID WC    cloNIDine  0.1 mg Oral TID    sodium chloride flush  5-40 mL Intravenous 2 times per day    thiamine  100 mg Oral Daily    multivitamin  1 tablet Oral Daily    QUEtiapine  200 mg Oral TID    nicotine  1 patch Transdermal Daily     Current Hospital IV Meds   dextrose      sodium chloride 25 mL (04/24/21 0059)     Current Hospital PRN Meds  oxyCODONE **OR** oxyCODONE, glucose, dextrose, glucagon (rDNA), dextrose, labetalol, sodium chloride flush, sodium chloride, acetaminophen, promethazine **OR** ondansetron, potassium chloride **OR** potassium alternative oral replacement **OR** potassium chloride, magnesium sulfate    Exam:   Vitals:    05/03/21 1316   BP: 121/84   Pulse: 96   Resp: 18   Temp: 101.3 °F (38.5 °C)   SpO2:      I/O last 3 completed shifts:   In: 240 [P.O.:240]  Out: 2350 [Urine:2350]   General appearance: alert, appears stated age and cooperative  HEENT: PERRLA  Neck: no adenopathy, no carotid bruit, no JVD, supple, symmetrical, trachea midline and thyroid not enlarged, symmetric, no tenderness/mass/nodules  Lungs: clear to auscultation bilaterally  Heart: regular rate and rhythm, S1, S2 normal, no murmur, click, rub or gallop  Abdomen: soft, non-tender; bowel sounds normal; no masses,  no organomegaly  Extremities: extremities normal, atraumatic, no cyanosis or edema     Labs:  CBC:   Recent Labs     05/01/21  0552 05/02/21  0611 05/03/21  0516   WBC 7.1 10.6 7.6   HGB 8.2* 8.9* 8.0*   HCT 24.2*

## 2021-05-03 NOTE — FLOWSHEET NOTE
05/03/21 0702   Vital Signs   Temp 97 °F (36.1 °C)   Temp Source Oral   Pulse 100   Heart Rate Source Monitor   Resp 18   BP (!) 132/90   BP Location Right lower arm   Patient Position Sitting   Level of Consciousness Alert (0)   MEWS Score 1   Patient Currently in Pain Yes   Oxygen Therapy   SpO2 98 %   O2 Device Nasal cannula   O2 Flow Rate (L/min) 3 L/min     AM assessment completed. See flowsheet. A/Ox4. LCTAB. Medications taken without difficulty. BS active x4. No c/o n/v/d. Cont of B&B. Patient states ABD tenderness has improved. No edema noted. Bed locked and in low position. Call light in reach.

## 2021-05-03 NOTE — FLOWSHEET NOTE
05/02/21 1917   Vital Signs   Temp 97.6 °F (36.4 °C)   Temp Source Oral   Pulse 98   Heart Rate Source Monitor   Resp 18   /69   BP Location Right upper arm   Patient Position Sitting   Level of Consciousness Alert (0)   MEWS Score 1   Oxygen Therapy   SpO2 96 %   O2 Device Nasal cannula   O2 Flow Rate (L/min) 3 L/min   Pt awake/alert and oriented times 4. Evening meds given. No acute distress.  Reid Christianson

## 2021-05-03 NOTE — PROGRESS NOTES
5/1/2021 CXR was personally reviewed by me: In comparison with prior film, patient has developed small bilateral pleural effusions along with bibasilar atelectasis and interstitial edema. 4/27/2021 CT abdomen lung windows show moderate right and small left pleural effusion along with atelectasis. Microbiology: SARS-CoV-2 NAAT negative x2    ASSESSMENT:  · Acute hypoxemic respiratory failure  · Abnormal chest x-ray: Clinically this is all consistent with acute pancreatitis causing respiratory failure. Patient does not meet criteria for ARDS. However, close observation in the hospital is warranted. Clinical picture is not suggestive of Covid pneumonia. I feel his imaging is entirely explained by acute pancreatitis with interstitial edema and bilateral effusions, best seen on the CT imaging.   · Recurrent acute alcoholic pancreatitis  · Electrolytes disorder  · Ongoing alcohol abuse  · Ongoing tobacco abuse    PLAN:  · Supplemental oxygen to maintain SaO2 >92%; wean as tolerated    · Management of acute pancreatitis per internal medicine and gastroenterology  · Lasix 20mg IV x 1  · Agree with recommendations for alcohol and tobacco cessation

## 2021-05-03 NOTE — PLAN OF CARE
Problem: Pain:  Goal: Pain level will decrease  Description: Pain level will decrease  5/3/2021 1539 by Joellen Koch RN  Outcome: Ongoing  5/3/2021 1539 by Joellen Koch RN  Outcome: Ongoing  Goal: Control of acute pain  Description: Control of acute pain  5/3/2021 1539 by Joellen Koch RN  Outcome: Ongoing  5/3/2021 1539 by Joellen Koch RN  Outcome: Ongoing  Goal: Control of chronic pain  Description: Control of chronic pain  5/3/2021 1539 by Joellen Koch RN  Outcome: Ongoing  5/3/2021 1539 by Joellen Koch RN  Outcome: Ongoing  Goal: Patient's pain/discomfort is manageable  Description: Patient's pain/discomfort is manageable  5/3/2021 1539 by Joellen Koch RN  Outcome: Ongoing  5/3/2021 1539 by Joellen Koch RN  Outcome: Ongoing     Problem: Infection:  Goal: Will remain free from infection  Description: Will remain free from infection  5/3/2021 1539 by Joellen Koch RN  Outcome: Ongoing  5/3/2021 1539 by Joellen Koch RN  Outcome: Ongoing     Problem: Safety:  Goal: Free from accidental physical injury  Description: Free from accidental physical injury  5/3/2021 1539 by Joellen Koch RN  Outcome: Ongoing  5/3/2021 1539 by Joellen Koch RN  Outcome: Ongoing  Goal: Free from intentional harm  Description: Free from intentional harm  5/3/2021 1539 by Joellen Koch RN  Outcome: Ongoing  5/3/2021 1539 by Joellen Koch RN  Outcome: Ongoing     Problem: Daily Care:  Goal: Daily care needs are met  Description: Daily care needs are met  5/3/2021 1539 by Joellen Koch RN  Outcome: Ongoing  5/3/2021 1539 by Joellen Koch RN  Outcome: Ongoing     Problem: Skin Integrity:  Goal: Skin integrity will stabilize  Description: Skin integrity will stabilize  5/3/2021 1539 by Joellen Koch RN  Outcome: Ongoing  5/3/2021 1539 by Joellen Koch RN  Outcome: Ongoing     Problem: Discharge Planning:  Goal: Patients continuum of care needs are met  Description: Patients continuum of care needs are met  5/3/2021 1539 by Joellen Koch RN  Outcome: Ongoing  5/3/2021 1539 by Kimberlyn Waller RN  Outcome: Ongoing     Problem: Nutrition  Goal: Optimal nutrition therapy  5/3/2021 1539 by Kimberlyn Waller RN  Outcome: Ongoing  5/3/2021 1539 by Kimberlyn Waller RN  Outcome: Ongoing  Goal: Understanding of nutritional guidelines  5/3/2021 1539 by Kimberlyn Waller RN  Outcome: Ongoing  5/3/2021 1539 by Kimberlyn Waller RN  Outcome: Ongoing

## 2021-05-03 NOTE — PROGRESS NOTES
: 21       Preadmission Environment    Pt. Lives Alone  Home environment:    apartment   Steps to enter first floor: one steps to enter  Steps to third  floor:     Full flight of 12-13 with bilateral railings 2.5 flights of steps   Bathroom: tub/shower unit,standard height commode   Equipment owned: none      Preadmission Status:  Pt. Able to drive: Yes   Pt Fully independent with ADLs: Yes  Pt. Required assistance from [facility] for: Laundry   Pt. independent for transfers and gait and walked with No Device  History of falls Yes     Pain  Yes  Ratin/10  Location: abdlomen   Pain Medicine Status: Received pain med prior to tx       Cognition    A&O x4   Able to follow 2 step commands     Subjective  Patient lying supine in bed with no family present. .   Pt agreeable to this PT eval & tx. Upper Extremity ROM/Strength  Please see OT evaluation. Lower Extremity ROM / Strength   AROM WFL: Yes    Strength Assessment (measured on a 0-5 scale):  R LE   Quad   4+   Ant Tib  4+   Hamstring 4+   Iliopsoas 4  L LE  Quad   4+   Ant Tib  4+   Hamstring 4+   Iliopsoas 4    Lower Extremity Sensation    WNL    Lower Extremity Proprioception:   WNL    Coordination and Tone  WNL    Balance  Sitting:  Good ;  Independent  Comments: for static and dynamic sitting, pt able to don socks at EOB indep (pt sits with slouched posture and looking down throughout)    Standing: Good - ; SBA  Comments: wide MALIHA and forward head with rounded shoulders, mild sway with good stepping reactions to maintain balance    Bed Mobility   Supine to Sit:    Not Tested  Sit to Supine:   Not Tested  Rolling:   Not Tested  Scooting in sitting: Independent  Scooting in supine:  Not Tested    Transfer Training     Sit to stand:   Supervision  Stand to sit:   Supervision  Bed to Chair:   Not Tested with use of N/A    Gait gait completed as indicated below  Distance:      100  ft  Deviations (firm surface/linoleum):  increased MALIHA, forward flexed posture, decreased step length bilaterally and decresed arm swing bilaterally, decreased trunk rotation bilaterally  Assistive Device Used:    gait belt  Level of Assist:    CGA  Comment: several small LOB with good stepping reaction to recover   Pt had an anxiety attack with ambulation in the hallway, pt returned to room and further ambulation deferred    Stair Training deferred, pt unsafe/ not appropriate to complete stairs at this time    Activity Tolerance   Pt completed therapy session with SOB noted with ambulation at onset of reported anxiety attack. Breathing slowed a few minutes after return to room and with distraction. Discussed behavioral techniques and methods to manage anxiety outside of medication management   HR: 88-91% on 1L at rest   86-92% on RA at rest and with ambulation, fluctuating rapidly and mostly staying in the high 80s-90s  HR: 101 bpm at rest   120-135 bpm with ambulation (highests noted during anxiety attack onset)    Positioning Needs   Pt up in chair, alarm set, positioned in proper neutral alignment and pressure relief provided. Call light provided and all needs within reach    Exercises Initiated  Danitza deferred secondary to treatment focus on functional mobility  NA    Other  See OT note for assist with lower body dressing. Addendum  Therapist responded to chair alarm. Pt found standing in room with lines all tangled and recliner partially reclined. Pt states he wants to use the urinal. Appears slightly confused. Pt worried he dropped his anxiety medication on the floor and knelt down to check. Mod I for getting up and down off the floor with use of armrest of chair.    Chair to Bed with SBA and no AD  Supervision for standing balance as pt attempted to use the urinal. Pt moved into kneeling on the bed as he used the urinal.   Heart rate noted to be 146 bpm while standing with SpO2 dropping to 86% on RA (pt had taken his O2 off, thought his mask was his O2, assisted pt with reapplying nasal canula). Therapist managed all lines during this time. Pt seated on the bed with alarm on and call light within reach. RN and PCA notified. Patient/Family Education   Pt educated on role of inpatient PT, POC, importance of continued activity, DC recommendations, safety awareness, transfer techniques, pursed lip breathing, pacing activity and calling for assist with mobility. Assessment  Pt seen for Physical Therapy evaluation in acute care setting. Pt demonstrated decreased Activity tolerance and Balance as well as decreased independence with Ambulation and Transfers. Pt would benefit from additional skilled physical therapy while in the acute care setting to progress activity level and decrease O2 needs with activity. Pt is limited by anxiety during some activities. Recommending Home initial 24/7 supervision upon discharge as patient functioning below baseline level    Goals : To be met in 3 visits:  1). Independent with LE Ex x 10 reps    To be met in 6 visits:  1). Supine to/from sit: Independent  2). Sit to/from stand: Independent  3). Bed to chair: Independent  4). Gait: Ambulate 50 ft.  with  Independent and use of No AD  5). Tolerate B LE exercises 3 sets of 10-15 reps  6). Ascend/descend 2.5 flights of 12-13 steps with Modified Independent with use of no handrail and No AD    Rehabilitation Potential: Good  Strengths for achieving goals include:   Pt motivated, PLOF, Family Support and Pt cooperative   Barriers to achieving goals include: Anxiety    Plan    To be seen 2-3 x / week  while in acute care setting for therapeutic exercises, bed mobility, transfers, progressive gait training, balance training, and family/patient education. Signature: Lesly Lux PT, DPT #[id number]    If patient discharges from this facility prior to next visit, this note will serve as the Discharge Summary.

## 2021-05-03 NOTE — CARE COORDINATION
HOME CARE      Patient to [place] home with home care through [place] [id number] and will then have referral with the additional support from their team for ETOH/pysch needs. Jerry Christianson with [facility] aware.       Hema Villalta  Work mobile: [phone]  [facility] office: [phone]

## 2021-05-04 ENCOUNTER — APPOINTMENT (OUTPATIENT)
Dept: CT IMAGING | Age: 46
DRG: 438 | End: 2021-05-04
Payer: MEDICARE

## 2021-05-04 PROBLEM — R09.02 HYPOXIA: Status: RESOLVED | Noted: 2021-05-01 | Resolved: 2021-05-04

## 2021-05-04 LAB
A/G RATIO: 0.8 (ref 1.1–2.2)
ALBUMIN SERPL-MCNC: 2.4 G/DL (ref 3.4–5)
ALP BLD-CCNC: 85 U/L (ref 40–129)
ALT SERPL-CCNC: 35 U/L (ref 10–40)
ANION GAP SERPL CALCULATED.3IONS-SCNC: 7 MMOL/L (ref 3–16)
AST SERPL-CCNC: 21 U/L (ref 15–37)
BILIRUB SERPL-MCNC: 0.3 MG/DL (ref 0–1)
BUN BLDV-MCNC: <2 MG/DL (ref 7–20)
CALCIUM SERPL-MCNC: 8.9 MG/DL (ref 8.3–10.6)
CHLORIDE BLD-SCNC: 102 MMOL/L (ref 99–110)
CO2: 30 MMOL/L (ref 21–32)
CREAT SERPL-MCNC: <0.5 MG/DL (ref 0.9–1.3)
GFR AFRICAN AMERICAN: >60
GFR NON-AFRICAN AMERICAN: >60
GLOBULIN: 2.9 G/DL
GLUCOSE BLD-MCNC: 100 MG/DL (ref 70–99)
GLUCOSE BLD-MCNC: 102 MG/DL (ref 70–99)
GLUCOSE BLD-MCNC: 105 MG/DL (ref 70–99)
GLUCOSE BLD-MCNC: 106 MG/DL (ref 70–99)
GLUCOSE BLD-MCNC: 107 MG/DL (ref 70–99)
HCT VFR BLD CALC: 24.3 % (ref 40.5–52.5)
HEMOGLOBIN: 8.3 G/DL (ref 13.5–17.5)
MCH RBC QN AUTO: 34.2 PG (ref 26–34)
MCHC RBC AUTO-ENTMCNC: 34.1 G/DL (ref 31–36)
MCV RBC AUTO: 100.2 FL (ref 80–100)
PDW BLD-RTO: 16 % (ref 12.4–15.4)
PERFORMED ON: ABNORMAL
PHOSPHORUS: 3.2 MG/DL (ref 2.5–4.9)
PLATELET # BLD: 764 K/UL (ref 135–450)
PMV BLD AUTO: 7.4 FL (ref 5–10.5)
POTASSIUM REFLEX MAGNESIUM: 3.7 MMOL/L (ref 3.5–5.1)
RBC # BLD: 2.42 M/UL (ref 4.2–5.9)
SODIUM BLD-SCNC: 139 MMOL/L (ref 136–145)
TOTAL PROTEIN: 5.3 G/DL (ref 6.4–8.2)
WBC # BLD: 8.5 K/UL (ref 4–11)

## 2021-05-04 PROCEDURE — 80053 COMPREHEN METABOLIC PANEL: CPT

## 2021-05-04 PROCEDURE — 85027 COMPLETE CBC AUTOMATED: CPT

## 2021-05-04 PROCEDURE — 97116 GAIT TRAINING THERAPY: CPT

## 2021-05-04 PROCEDURE — 6360000002 HC RX W HCPCS: Performed by: INTERNAL MEDICINE

## 2021-05-04 PROCEDURE — 6370000000 HC RX 637 (ALT 250 FOR IP): Performed by: INTERNAL MEDICINE

## 2021-05-04 PROCEDURE — 36415 COLL VENOUS BLD VENIPUNCTURE: CPT

## 2021-05-04 PROCEDURE — 94761 N-INVAS EAR/PLS OXIMETRY MLT: CPT

## 2021-05-04 PROCEDURE — 97530 THERAPEUTIC ACTIVITIES: CPT

## 2021-05-04 PROCEDURE — 2580000003 HC RX 258: Performed by: INTERNAL MEDICINE

## 2021-05-04 PROCEDURE — 2700000000 HC OXYGEN THERAPY PER DAY

## 2021-05-04 PROCEDURE — 1200000000 HC SEMI PRIVATE

## 2021-05-04 PROCEDURE — 6360000004 HC RX CONTRAST MEDICATION: Performed by: PHYSICIAN ASSISTANT

## 2021-05-04 PROCEDURE — 2580000003 HC RX 258: Performed by: PHYSICIAN ASSISTANT

## 2021-05-04 PROCEDURE — 99233 SBSQ HOSP IP/OBS HIGH 50: CPT | Performed by: INTERNAL MEDICINE

## 2021-05-04 PROCEDURE — 84100 ASSAY OF PHOSPHORUS: CPT

## 2021-05-04 PROCEDURE — 74177 CT ABD & PELVIS W/CONTRAST: CPT

## 2021-05-04 PROCEDURE — 6370000000 HC RX 637 (ALT 250 FOR IP): Performed by: PHYSICIAN ASSISTANT

## 2021-05-04 RX ORDER — FUROSEMIDE 10 MG/ML
40 INJECTION INTRAMUSCULAR; INTRAVENOUS ONCE
Status: COMPLETED | OUTPATIENT
Start: 2021-05-04 | End: 2021-05-04

## 2021-05-04 RX ADMIN — CLONIDINE HYDROCHLORIDE 0.1 MG: 0.1 TABLET ORAL at 14:45

## 2021-05-04 RX ADMIN — CHLORDIAZEPOXIDE HYDROCHLORIDE 5 MG: 5 CAPSULE ORAL at 14:45

## 2021-05-04 RX ADMIN — MEROPENEM 1000 MG: 1 INJECTION, POWDER, FOR SOLUTION INTRAVENOUS at 09:07

## 2021-05-04 RX ADMIN — POTASSIUM BICARBONATE 20 MEQ: 782 TABLET, EFFERVESCENT ORAL at 09:08

## 2021-05-04 RX ADMIN — QUETIAPINE FUMARATE 200 MG: 200 TABLET ORAL at 14:45

## 2021-05-04 RX ADMIN — OXYCODONE HYDROCHLORIDE 10 MG: 5 TABLET ORAL at 21:58

## 2021-05-04 RX ADMIN — Medication 100 MG: at 09:03

## 2021-05-04 RX ADMIN — CLONIDINE HYDROCHLORIDE 0.1 MG: 0.1 TABLET ORAL at 09:03

## 2021-05-04 RX ADMIN — MEROPENEM 1000 MG: 1 INJECTION, POWDER, FOR SOLUTION INTRAVENOUS at 00:51

## 2021-05-04 RX ADMIN — OXYCODONE HYDROCHLORIDE 10 MG: 5 TABLET ORAL at 00:51

## 2021-05-04 RX ADMIN — PANTOPRAZOLE SODIUM 40 MG: 40 TABLET, DELAYED RELEASE ORAL at 14:45

## 2021-05-04 RX ADMIN — MEROPENEM 1000 MG: 1 INJECTION, POWDER, FOR SOLUTION INTRAVENOUS at 16:33

## 2021-05-04 RX ADMIN — CHLORDIAZEPOXIDE HYDROCHLORIDE 5 MG: 5 CAPSULE ORAL at 09:03

## 2021-05-04 RX ADMIN — OXYCODONE HYDROCHLORIDE 10 MG: 5 TABLET ORAL at 09:02

## 2021-05-04 RX ADMIN — PENTOXIFYLLINE 400 MG: 400 TABLET, FILM COATED, EXTENDED RELEASE ORAL at 09:06

## 2021-05-04 RX ADMIN — FUROSEMIDE 40 MG: 10 INJECTION, SOLUTION INTRAMUSCULAR; INTRAVENOUS at 09:07

## 2021-05-04 RX ADMIN — IOPAMIDOL 75 ML: 755 INJECTION, SOLUTION INTRAVENOUS at 14:03

## 2021-05-04 RX ADMIN — ACETAMINOPHEN 650 MG: 325 TABLET ORAL at 14:45

## 2021-05-04 RX ADMIN — PANCRELIPASE 72000 UNITS: 24000; 76000; 120000 CAPSULE, DELAYED RELEASE PELLETS ORAL at 09:06

## 2021-05-04 RX ADMIN — OXYCODONE HYDROCHLORIDE 10 MG: 5 TABLET ORAL at 16:33

## 2021-05-04 RX ADMIN — PANTOPRAZOLE SODIUM 40 MG: 40 TABLET, DELAYED RELEASE ORAL at 06:04

## 2021-05-04 RX ADMIN — PENTOXIFYLLINE 400 MG: 400 TABLET, FILM COATED, EXTENDED RELEASE ORAL at 16:38

## 2021-05-04 RX ADMIN — PENTOXIFYLLINE 400 MG: 400 TABLET, FILM COATED, EXTENDED RELEASE ORAL at 12:30

## 2021-05-04 RX ADMIN — Medication 10 ML: at 09:14

## 2021-05-04 RX ADMIN — QUETIAPINE FUMARATE 200 MG: 200 TABLET ORAL at 09:03

## 2021-05-04 RX ADMIN — QUETIAPINE FUMARATE 200 MG: 200 TABLET ORAL at 21:58

## 2021-05-04 RX ADMIN — CHLORDIAZEPOXIDE HYDROCHLORIDE 5 MG: 5 CAPSULE ORAL at 21:58

## 2021-05-04 RX ADMIN — Medication 10 ML: at 21:59

## 2021-05-04 RX ADMIN — ENOXAPARIN SODIUM 40 MG: 40 INJECTION SUBCUTANEOUS at 09:08

## 2021-05-04 RX ADMIN — PANCRELIPASE 72000 UNITS: 24000; 76000; 120000 CAPSULE, DELAYED RELEASE PELLETS ORAL at 12:30

## 2021-05-04 RX ADMIN — PANCRELIPASE 72000 UNITS: 24000; 76000; 120000 CAPSULE, DELAYED RELEASE PELLETS ORAL at 16:34

## 2021-05-04 RX ADMIN — MULTIPLE VITAMINS W/ MINERALS TAB 1 TABLET: TAB at 09:03

## 2021-05-04 ASSESSMENT — PAIN SCALES - GENERAL
PAINLEVEL_OUTOF10: 8
PAINLEVEL_OUTOF10: 7
PAINLEVEL_OUTOF10: 3
PAINLEVEL_OUTOF10: 7

## 2021-05-04 NOTE — PROGRESS NOTES
Hospitalist Progress Note      PCP: Aaron Dick MD    Date of Admission: 4/20/2021    Subjective: Dara Arias continues to feel better overall, but remains hypoxic and febrile   - tolerating low fat diet   - UOP documented is not accurate, patient had been emptying his own urinal       Medications:  Reviewed    Infusion Medications    dextrose      sodium chloride 25 mL (04/24/21 0059)     Scheduled Medications    chlordiazePOXIDE  5 mg Oral TID    lidocaine  1 patch Transdermal Daily    enoxaparin  40 mg Subcutaneous Daily    pantoprazole  40 mg Oral BID AC    lipase-protease-amylase  72,000 Units Oral TID WC    meropenem  1,000 mg Intravenous Q8H    pentoxifylline  400 mg Oral TID WC    cloNIDine  0.1 mg Oral TID    sodium chloride flush  5-40 mL Intravenous 2 times per day    thiamine  100 mg Oral Daily    multivitamin  1 tablet Oral Daily    QUEtiapine  200 mg Oral TID    nicotine  1 patch Transdermal Daily     PRN Meds: oxyCODONE **OR** oxyCODONE, glucose, dextrose, glucagon (rDNA), dextrose, labetalol, sodium chloride flush, sodium chloride, acetaminophen, promethazine **OR** ondansetron, potassium chloride **OR** potassium alternative oral replacement **OR** potassium chloride, magnesium sulfate      Intake/Output Summary (Last 24 hours) at 5/4/2021 1113  Last data filed at 5/4/2021 0847  Gross per 24 hour   Intake 240 ml   Output 1250 ml   Net -1010 ml       Physical Exam Performed:    /68   Pulse 98   Temp 97.8 °F (36.6 °C) (Oral)   Resp 18   Ht 6' (1.829 m)   Wt 178 lb 11.2 oz (81.1 kg)   SpO2 92%   BMI 24.24 kg/m²     Gen: Disheveled appearing male. No distress. Alert. Eyes: PERRL. No sclera icterus. No conjunctival injection. ENT: No discharge. Pharynx clear. Neck: No JVD. Trachea midline. Resp: No accessory muscle use. No crackles. No wheezes. No rhonchi. Diminished breath sounds bilaterally, more so in the bases   CV: Tachycardic rate. Regular rhythm. No murmur.   No secured at   the 105 cm natty. IR GI TUBE W FLUOROSCOPY   Final Result   Successful placement of a post pyloric feeding tube. The tube was secured to   the nose at the 95 cm natty         CT ABDOMEN PELVIS W IV CONTRAST Additional Contrast? None   Final Result   Acute pancreatitis with questionable developing necrosis in the pancreatic   tail. Fluid collection abutting the inferior aspect of the pancreatic head likely a   pseudocyst or abscess. Marked diffuse hepatic steatosis. CT ABDOMEN W CONTRAST Additional Contrast? None    (Results Pending)         MANDO Carrillo have reviewed the chart on [facility] and personally interviewed and examined patient, reviewed the data (labs and imaging) and after discussion with my PA formulated the plan. Agree with note with the following edits. HPI:     Having some epigastric pain. Is hypoxic. He is a smoker. No chest pain. He is a smoker. 5/2-much improved with regards to abdominal pain. Still on 3 L of oxygen. 5/3- tolerating a diet. Down to 1 L of oxygen. 5/4- spiked a fever. Hypoxic on room air-85% standing up. I reviewed the patient's Past Medical History, Past Surgical History, Medications, and Allergies. Physical exam:    /68   Pulse 98   Temp 97.8 °F (36.6 °C) (Oral)   Resp 18   Ht 6' (1.829 m)   Wt 178 lb 11.2 oz (81.1 kg)   SpO2 92%   BMI 24.24 kg/m²     Gen: No distress. Alert. Eyes: PERRL. No sclera icterus. No conjunctival injection. ENT: No discharge. Pharynx clear. Neck: Trachea midline. Normal thyroid. Resp: No accessory muscle use. No crackles. No wheezes. No rhonchi. No dullness on percussion. CV: Regular rate. Regular rhythm. No murmur or rub. No edema. GI: epigastric tenderness. Non-distended. No masses. No organomegaly. Normal bowel sounds. No hernia.      Assessment/Plan:    #Acute Recurrent Alcoholic Pancreatitis with Possible Abscess  - CT showed acute pancreatitis with questionable developing necrosis of the pancreatic tail with fluid collection abutting the inferior aspect of the pancreatic head  - s/p EUS with FNA on 8/20/20 which was neg for malignant cells with features suggestive of fat necrosis; f/w Dr. Leal Files- consulted - added pentoxifylline   - aggressive IVF and NPO started on admission  - Persistent fevers- sp 6 days of zosyn. Repeated CT on 4/27 which showed worsening pancreatitis with necrosis. Now on Merrem D#7. Fevers are persistent- repeat CT abd with IV contrast today 5/4  - NJ tube placed x 2 and received post pyloric feeds x 1 day, but tube came out again on 4/28. Now on low fat diet and tolerating well   - creon added  - changed oxycodone from scheduled to PRN   - abstinence from alcohol strongly advised daily      #Hypoxia - improving   - patient was 80% on RA . CXR with bilateral effusions and subpleural opacities   - suspect related to acute pancreatitis, not meeting criteria for ARDS   - he has had rapid COVID 19 x 2 since admission which are both negative- I do not suspect COVID 19 as the cause   - started IS  - closely monitor- continuous pulse oximetry   - pulmonary consultation- appreciate recs   - IV lasix started 5/3, UOP documentation is not accurate, he continues to empty his own urinal     #Leukocytosis - Resolved  - likely 2/2 above  - trended down     #Hypokalemia, Hypophosphatemia and hypomagnesemia  - cont to replete aggressively     #Hepatic Steatosis  #Elevated LFTs  - noted on CT abdomen  - LFTs trending down, monitor  - encouraged  alcohol cessation     #Alcohol Abuse  #Alcohol Withdrawal- resolved   - last drink was 4/20  - fall and seizure protocol   - sp CIWA, now off.   Weaning off Librium now      #Tachycardia - improved   - Cont clonidine  - tachycardia likely from alcohol withdrawal syndrome & pancreatitis     #Hyponatremia - resolved   - monitor with IV hydration  - Improved; sodium is up to 139 today      #Normocytic anemia   - Hb 17 on admit (suspect with dehydration)-> 8 and remaining stable at 8 for the past few days  - no apparent bleeding  - iron studies with elevated ferritin, but low Iron      #Thrombocytosis  - suspect reactive, monitoring CBC daily   DVT Prophylaxis: Lovenox.     Diet: Dietary Nutrition Supplements: Clear Liquid Oral Supplement  DIET LOW FAT;  Code Status: Full Code    Rosio Johnston PA-C  5/4/2021 11:13 AM        COLIN GUTIERREZ 5/4/2021 11:18 AM

## 2021-05-04 NOTE — PROGRESS NOTES
Patient 92 at rest on RA. Patient 93  on 3 L at rest.  Patient 92 on RA ambulating  Patient 92 at  3L ambulating.

## 2021-05-04 NOTE — PLAN OF CARE
Nutrition Problem #1: Severe malnutrition  Intervention: Food and/or Nutrient Delivery: Continue Current Diet, Modify Oral Nutrition Supplement  Nutritional Goals: pt will consume 50% or greater of all meals on Low fat diet  +100% or greater of Ensure Clear with breakfast meal x 1 meal per day without s/s of GI distress

## 2021-05-04 NOTE — PROGRESS NOTES
Comprehensive Nutrition Assessment    Type and Reason for Visit:  Reassess    Nutrition Recommendations/Plan:   1. Will continue Low Fat diet and modify Ensure Clear to 1 time daily with Breakfast meals  2. Monitor appetite, PO +ONS intake, diet tolerance and for s/s of GI distress  3. Monitor weight trends, bowel function, and nutrition related lab values    Nutrition Assessment:  pt has slightly improved from a nutritional standpoint AEB po diet advanced on 5/2 to low fat diet, pt reports tolerating well, however he remains at risk for further compromise d/t pt reports only consuming most liquids on low fat diet still, weight loss during admission, ongoing nausea, poor appetite, inadequate energy intake x 14 days admission, altered nutrition related lab values, alcohol-induced acute pancreatitis r/t alcohol abuse; Will continue Low fat diet and monitor diet tolerance    Malnutrition Assessment:  Malnutrition Status:  Severe malnutrition    Context:  Acute Illness     Findings of the 6 clinical characteristics of malnutrition:  Energy Intake:  1 - 75% or less of estimated energy requirements for 7 or more days(pt was NPO 4/27-4/30, then clear liquid 4/30-5/2; advanced to low fat diet on 5/2 consuming less than 75% of energy intake on Low fat diet)  Weight Loss:  7 - Greater than 2% over 1 week(-13# or 7.3% x 14 days admission)     Body Fat Loss:  1 - Mild body fat loss Orbital   Muscle Mass Loss:  7 - Moderate muscle mass loss Temples (temporalis), Clavicles (pectoralis & deltoids), Scapula (trapezius)  Fluid Accumulation:  No significant fluid accumulation(noted none)     Strength:  Not Performed    Estimated Daily Nutrient Needs:  Energy (kcal):  0343-6335 kcals per day based on 25-27 kcals/kg/CBW; Weight Used for Energy Requirements:  Current     Protein (g):  81-97 g protein per day based on 1-1.2 g/kg/CBW;  Weight Used for Protein Requirements:  Current        Fluid (ml/day):  6449-0746 ml/day; Method mild loss of subcutaneous fat    Nutrition Interventions:   Food and/or Nutrient Delivery:  Continue Current Diet, Modify Oral Nutrition Supplement  Nutrition Education/Counseling:  No recommendation at this time   Coordination of Nutrition Care:  Continue to monitor while inpatient    Goals:  pt will consume 50% or greater of all meals on Low fat diet  +100% or greater of Ensure Clear with breakfast meal x 1 meal per day without s/s of GI distress       Nutrition Monitoring and Evaluation:   Behavioral-Environmental Outcomes:  None Identified   Food/Nutrient Intake Outcomes:  Food and Nutrient Intake, Supplement Intake, Diet Advancement/Tolerance  Physical Signs/Symptoms Outcomes:  Biochemical Data, GI Status, Nutrition Focused Physical Findings, Weight, Nausea or Vomiting     Discharge Planning:    Continue current diet, Continue Oral Nutrition Supplement     Electronically signed by Yong Louis RD, LD on 5/4/21 at 12:10 PM EDT    Contact: [phone]

## 2021-05-04 NOTE — PROGRESS NOTES
Awake, in bed. C/o abd pain. Oxy 10mg given per request. Lasix x1 also given. Patient also states he hopes to go home today. Call light within reach. Will continue to monitor.

## 2021-05-04 NOTE — PLAN OF CARE
Problem: Pain:  Goal: Pain level will decrease  Description: Pain level will decrease  5/3/2021 2213 by Kwan Bardales RN  Outcome: Ongoing  5/3/2021 1539 by Sanjiv Ramires RN  Outcome: Ongoing  5/3/2021 1539 by Sanjiv Ramires RN  Outcome: Ongoing  Goal: Control of acute pain  Description: Control of acute pain  5/3/2021 2213 by Kwan Bardales RN  Outcome: Ongoing  5/3/2021 1539 by Sanjiv Ramires RN  Outcome: Ongoing  5/3/2021 1539 by Sanjiv Ramires RN  Outcome: Ongoing  Goal: Control of chronic pain  Description: Control of chronic pain  5/3/2021 2213 by Kwan Bardales RN  Outcome: Ongoing  5/3/2021 1539 by Sanjiv Ramires RN  Outcome: Ongoing  5/3/2021 1539 by Sanjiv Ramires RN  Outcome: Ongoing  Goal: Patient's pain/discomfort is manageable  Description: Patient's pain/discomfort is manageable  5/3/2021 2213 by Kwan Bardales RN  Outcome: Ongoing  5/3/2021 1539 by Sanjiv Ramires RN  Outcome: Ongoing  5/3/2021 1539 by Sanjiv Ramires RN  Outcome: Ongoing     Problem: Infection:  Goal: Will remain free from infection  Description: Will remain free from infection  5/3/2021 2213 by Kwan Bardales RN  Outcome: Ongoing  5/3/2021 1539 by Sanjiv Ramires RN  Outcome: Ongoing  5/3/2021 1539 by Sanjiv Ramires RN  Outcome: Ongoing     Problem: Safety:  Goal: Free from accidental physical injury  Description: Free from accidental physical injury  5/3/2021 2213 by Kwan Bardales RN  Outcome: Ongoing  5/3/2021 1539 by Sanjiv Ramires RN  Outcome: Ongoing  5/3/2021 1539 by Sanjiv Ramires RN  Outcome: Ongoing  Goal: Free from intentional harm  Description: Free from intentional harm  5/3/2021 2213 by Kwan Bardales RN  Outcome: Ongoing  5/3/2021 1539 by Sanjiv Ramires RN  Outcome: Ongoing  5/3/2021 1539 by Sanjiv Ramires RN  Outcome: Ongoing     Problem: Daily Care:  Goal: Daily care needs are met  Description: Daily care needs are met  5/3/2021 2213 by Kwan Bardales RN  Outcome: Ongoing  5/3/2021 1539 by Sanjiv Ramires RN  Outcome: Ongoing  5/3/2021 1539 by Jean Benavides RN  Outcome: Ongoing     Problem: Skin Integrity:  Goal: Skin integrity will stabilize  Description: Skin integrity will stabilize  5/3/2021 2213 by Tarik Hallman RN  Outcome: Ongoing  5/3/2021 1539 by Jean Benavides RN  Outcome: Ongoing  5/3/2021 1539 by Jean Benavides RN  Outcome: Ongoing     Problem: Discharge Planning:  Goal: Patients continuum of care needs are met  Description: Patients continuum of care needs are met  5/3/2021 2213 by Tarik Hallman RN  Outcome: Ongoing  5/3/2021 1539 by Jean Benavides RN  Outcome: Ongoing  5/3/2021 1539 by Jean Benavides RN  Outcome: Ongoing     Problem: Nutrition  Goal: Optimal nutrition therapy  5/3/2021 2213 by Tarik Hallman RN  Outcome: Ongoing  5/3/2021 1539 by Jean Benavides RN  Outcome: Ongoing  5/3/2021 1539 by Jean Benavides RN  Outcome: Ongoing  Goal: Understanding of nutritional guidelines  5/3/2021 2213 by Tarik Hallman RN  Outcome: Ongoing  5/3/2021 1539 by Jean Benavides RN  Outcome: Ongoing  5/3/2021 1539 by Jean Benavides RN  Outcome: Ongoing

## 2021-05-04 NOTE — PROGRESS NOTES
Pulmonary Progress Note  CC: Hypoxemia and pancreatitis    Subjective:     Fever    EXAM:   /73   Pulse 100   Temp 97.4 °F (36.3 °C) (Oral)   Resp 18   Ht 6' (1.829 m)   Wt 178 lb 11.2 oz (81.1 kg)   SpO2 94%   BMI 24.24 kg/m²  on 3 L  Constitutional:  No acute distress   HEENT: no scleral icterus  Neck: No tracheal deviation present. Cardiovascular: Normal heart sounds. Pulmonary/Chest: No wheezes. No rhonchi. No rales. + decreased breath sounds. No accessory muscle usage or stridor. Abdominal: Soft. Musculoskeletal: No cyanosis. No clubbing. Skin: Skin is warm and dry.      Scheduled Meds:   furosemide  40 mg Intravenous Once    potassium bicarb-citric acid  20 mEq Oral Once    chlordiazePOXIDE  5 mg Oral TID    lidocaine  1 patch Transdermal Daily    enoxaparin  40 mg Subcutaneous Daily    pantoprazole  40 mg Oral BID AC    lipase-protease-amylase  72,000 Units Oral TID WC    meropenem  1,000 mg Intravenous Q8H    pentoxifylline  400 mg Oral TID WC    cloNIDine  0.1 mg Oral TID    sodium chloride flush  5-40 mL Intravenous 2 times per day    thiamine  100 mg Oral Daily    multivitamin  1 tablet Oral Daily    QUEtiapine  200 mg Oral TID    nicotine  1 patch Transdermal Daily     Continuous Infusions:   dextrose      sodium chloride 25 mL (04/24/21 0059)     PRN Meds:  oxyCODONE **OR** oxyCODONE, glucose, dextrose, glucagon (rDNA), dextrose, labetalol, sodium chloride flush, sodium chloride, acetaminophen, promethazine **OR** ondansetron, potassium chloride **OR** potassium alternative oral replacement **OR** potassium chloride, magnesium sulfate    Labs:  CBC:   Recent Labs     05/02/21  0611 05/03/21  0516 05/04/21  0558   WBC 10.6 7.6 8.5   HGB 8.9* 8.0* 8.3*   HCT 25.8* 23.6* 24.3*   MCV 99.3 99.5 100.2*   * 624* 764*     BMP:   Recent Labs     05/02/21  0611 05/03/21  0516 05/04/21  0558    136 139   K 3.7 3.1* 3.7    99 102   CO2 27 27 30   PHOS 2.4* 3. 2 3.2   BUN <2* <2* <2*   CREATININE <0.5* <0.5* <0.5*     5/1/2021 CXR was personally reviewed by me: In comparison with prior film, patient has developed small bilateral pleural effusions along with bibasilar atelectasis and interstitial edema. 4/27/2021 CT abdomen lung windows show moderate right and small left pleural effusion along with atelectasis. Microbiology: SARS-CoV-2 NAAT negative x2    ASSESSMENT:  · Acute hypoxemic respiratory failure  · Abnormal chest x-ray: Clinically this is all consistent with acute pancreatitis causing respiratory failure. Patient does not meet criteria for ARDS. However, close observation in the hospital is warranted. Clinical picture is not suggestive of Covid pneumonia. I feel his imaging is entirely explained by acute pancreatitis with interstitial edema and bilateral effusions, best seen on the CT imaging.   · Recurrent acute alcoholic pancreatitis  · Electrolytes disorder  · Ongoing alcohol abuse  · Ongoing tobacco abuse    PLAN:  · Supplemental oxygen to maintain SaO2 >92%; wean as tolerated    · Management of acute pancreatitis per internal medicine and gastroenterology  · Repeat Lasix 40mg IV x 1  · Agree with recommendations for alcohol and tobacco cessation  · CT planned to today for persistent fever

## 2021-05-04 NOTE — PROGRESS NOTES
Inpatient Physical Therapy Daily Treatment Note    Unit: [address]  Date:  5/4/2021  Patient Name:    Johan Mcmillan  Admitting diagnosis:  Acute recurrent pancreatitis [K85.90]  Admit Date:  4/20/2021  Precautions/Restrictions:  Fall risk and Bed/chair alarm      Discharge Recommendations: Home 24 hr supervision  initially  DME needs for discharge: Shower Chair       Therapy recommendation for EMS Transport: can transport by wheelchair    Therapy recommendations for staff:   Stand by assist with use of No AD and gait belt for all transfers and ambulation within room  within halls    History of Present Illness:   (Per H&P by Dr. Moy Mohan on 4/21/21)  39 y. o. male who presented to the hospital with a chief complaint of abdominal pain.  The patient has a mati:y complex history of pancreatitis requiring EUS for what was thought to be a pancreatic mass.  Presented emergency department with a chief complaint of diffuse abdominal pain.  He states he drank heavily 2 nights ago and developed pain last night.  He rates his pain as 10 out of 10 and diffuse.  He denies any fevers or chills and feels like this is a bout of pancreatitis given his history.  In the emergency department he was found to have an elevated lipase and white blood count.  A CT scan confirmed pancreatitis with concerns of necrosis and possible abscess formation.  He will be admitted for further medical therapies. PMH: HDL, HTN, ETOH abuse, antisocial behavior, Bipolar affective disorders, depression, insomnia, low back pain, panic disorder, polysubstance abuse    Home Health S4 Level Recommendation: NA  AM-PAC Mobility Score   AM-PAC Inpatient Mobility Raw Score : 21       Treatment Time:  38 minutes  Treatment number: 2  Timed Code Treatment Minutes: 38 minutes  Total Treatment Minutes:  38  minutes    Cognition    A&O orientation not directly assessed.     Able to follow 2 step commands    Subjective  Patient sitting up cross legged in bed with no family sustain for <5 sec)   - SLS L (able to sustain for <5 sec initially, progressing to ~10 sec)   - Tandem R   - Tandem L   - Normal stance eyes closed (CGA, no sway)   - Narrow stance eyes open    - Narrow stance eyes closed (pt minimally tolerant, opens eyes frequently)    Patient Education      Role of PT, POC, Discharge recommendations, safety awareness and calling for assist with mobility, postural education in standing and reclined in bed, to mitigate neck pain. Positioning Needs       Pt in bed, alarm set, positioned in proper neutral alignment and pressure relief provided. Call light provided and all needs within reach    Activity Tolerance   Pt completed therapy session with No adverse symptoms noted w/activity. At EOB before activity, SpO2=90-92% and HH=437's. During ambulation, SpO2=90-91% and HR initially in 120's but at one point increasing rapidly to 230's. Pt was asymptomatic and able to continue ambulating and talking to therapist with ease/calm; ambulated ~50 ft back to room and sat EOB; HR dropped to 120's as pt approached bed. Question accuracy of reading. Other  None    Assessment :  Patient tolerated treatment well today with no noted increased anxiety. He ambulated community distance with regular sway and occasional stagger requiring wide stepping reaction, but no overt LOB. Pt completed static standing balance progression to improve righting reactions and proprioception. Again he demonstrated notable balance deficits but no overt LOB. Recommending Home 24 hr supervision upon discharge as patient functioning close to baseline level    Goals (all goals ongoing unless otherwise indicated)  To be met in 3 visits:  1). Independent with LE Ex x 10 reps     To be met in 6 visits:  1). Supine to/from sit: Independent  2). Sit to/from stand: Independent  3). Bed to chair: Independent  4). Gait: Ambulate 50 ft.  with  Independent and use of No AD  5).   Tolerate B LE exercises 3 sets of 10-15 reps  6). Ascend/descend 2.5 flights of 12-13 steps with Modified Independent with use of no handrail and No AD      Plan   Continue with plan of care. Signature: Gris Francois, PT, DPT    If patient discharges from this facility prior to next visit, this note will serve as the Discharge Summary.

## 2021-05-05 LAB
A/G RATIO: 0.9 (ref 1.1–2.2)
ALBUMIN SERPL-MCNC: 2.7 G/DL (ref 3.4–5)
ALP BLD-CCNC: 86 U/L (ref 40–129)
ALT SERPL-CCNC: 31 U/L (ref 10–40)
ANION GAP SERPL CALCULATED.3IONS-SCNC: 10 MMOL/L (ref 3–16)
AST SERPL-CCNC: 21 U/L (ref 15–37)
BILIRUB SERPL-MCNC: 0.3 MG/DL (ref 0–1)
BUN BLDV-MCNC: 3 MG/DL (ref 7–20)
CALCIUM SERPL-MCNC: 9 MG/DL (ref 8.3–10.6)
CHLORIDE BLD-SCNC: 101 MMOL/L (ref 99–110)
CO2: 27 MMOL/L (ref 21–32)
CREAT SERPL-MCNC: 0.6 MG/DL (ref 0.9–1.3)
GFR AFRICAN AMERICAN: >60
GFR NON-AFRICAN AMERICAN: >60
GLOBULIN: 3 G/DL
GLUCOSE BLD-MCNC: 104 MG/DL (ref 70–99)
GLUCOSE BLD-MCNC: 115 MG/DL (ref 70–99)
GLUCOSE BLD-MCNC: 122 MG/DL (ref 70–99)
GLUCOSE BLD-MCNC: 125 MG/DL (ref 70–99)
HCT VFR BLD CALC: 25.4 % (ref 40.5–52.5)
HEMOGLOBIN: 8.7 G/DL (ref 13.5–17.5)
MCH RBC QN AUTO: 33.8 PG (ref 26–34)
MCHC RBC AUTO-ENTMCNC: 34 G/DL (ref 31–36)
MCV RBC AUTO: 99.2 FL (ref 80–100)
PDW BLD-RTO: 16.2 % (ref 12.4–15.4)
PERFORMED ON: ABNORMAL
PHOSPHORUS: 3.1 MG/DL (ref 2.5–4.9)
PLATELET # BLD: 850 K/UL (ref 135–450)
PMV BLD AUTO: 7.5 FL (ref 5–10.5)
POTASSIUM REFLEX MAGNESIUM: 3.6 MMOL/L (ref 3.5–5.1)
RBC # BLD: 2.56 M/UL (ref 4.2–5.9)
SODIUM BLD-SCNC: 138 MMOL/L (ref 136–145)
TOTAL PROTEIN: 5.7 G/DL (ref 6.4–8.2)
WBC # BLD: 8.2 K/UL (ref 4–11)

## 2021-05-05 PROCEDURE — 6360000002 HC RX W HCPCS: Performed by: INTERNAL MEDICINE

## 2021-05-05 PROCEDURE — 2580000003 HC RX 258: Performed by: PHYSICIAN ASSISTANT

## 2021-05-05 PROCEDURE — 36415 COLL VENOUS BLD VENIPUNCTURE: CPT

## 2021-05-05 PROCEDURE — 6370000000 HC RX 637 (ALT 250 FOR IP): Performed by: INTERNAL MEDICINE

## 2021-05-05 PROCEDURE — 85027 COMPLETE CBC AUTOMATED: CPT

## 2021-05-05 PROCEDURE — 99233 SBSQ HOSP IP/OBS HIGH 50: CPT | Performed by: INTERNAL MEDICINE

## 2021-05-05 PROCEDURE — 80053 COMPREHEN METABOLIC PANEL: CPT

## 2021-05-05 PROCEDURE — 84100 ASSAY OF PHOSPHORUS: CPT

## 2021-05-05 PROCEDURE — 1200000000 HC SEMI PRIVATE

## 2021-05-05 PROCEDURE — 6370000000 HC RX 637 (ALT 250 FOR IP): Performed by: PHYSICIAN ASSISTANT

## 2021-05-05 PROCEDURE — 2580000003 HC RX 258: Performed by: INTERNAL MEDICINE

## 2021-05-05 RX ORDER — FUROSEMIDE 10 MG/ML
40 INJECTION INTRAMUSCULAR; INTRAVENOUS ONCE
Status: COMPLETED | OUTPATIENT
Start: 2021-05-05 | End: 2021-05-05

## 2021-05-05 RX ADMIN — PENTOXIFYLLINE 400 MG: 400 TABLET, FILM COATED, EXTENDED RELEASE ORAL at 08:48

## 2021-05-05 RX ADMIN — POTASSIUM BICARBONATE 20 MEQ: 782 TABLET, EFFERVESCENT ORAL at 13:40

## 2021-05-05 RX ADMIN — PANCRELIPASE 72000 UNITS: 24000; 76000; 120000 CAPSULE, DELAYED RELEASE PELLETS ORAL at 17:30

## 2021-05-05 RX ADMIN — OXYCODONE HYDROCHLORIDE 10 MG: 5 TABLET ORAL at 04:14

## 2021-05-05 RX ADMIN — CLONIDINE HYDROCHLORIDE 0.1 MG: 0.1 TABLET ORAL at 19:59

## 2021-05-05 RX ADMIN — CHLORDIAZEPOXIDE HYDROCHLORIDE 5 MG: 5 CAPSULE ORAL at 08:44

## 2021-05-05 RX ADMIN — MEROPENEM 1000 MG: 1 INJECTION, POWDER, FOR SOLUTION INTRAVENOUS at 13:39

## 2021-05-05 RX ADMIN — QUETIAPINE FUMARATE 200 MG: 200 TABLET ORAL at 08:44

## 2021-05-05 RX ADMIN — ONDANSETRON HYDROCHLORIDE 4 MG: 2 INJECTION, SOLUTION INTRAMUSCULAR; INTRAVENOUS at 08:44

## 2021-05-05 RX ADMIN — FUROSEMIDE 40 MG: 10 INJECTION, SOLUTION INTRAMUSCULAR; INTRAVENOUS at 13:39

## 2021-05-05 RX ADMIN — PENTOXIFYLLINE 400 MG: 400 TABLET, FILM COATED, EXTENDED RELEASE ORAL at 17:30

## 2021-05-05 RX ADMIN — OXYCODONE HYDROCHLORIDE 10 MG: 5 TABLET ORAL at 19:59

## 2021-05-05 RX ADMIN — PANTOPRAZOLE SODIUM 40 MG: 40 TABLET, DELAYED RELEASE ORAL at 17:30

## 2021-05-05 RX ADMIN — QUETIAPINE FUMARATE 200 MG: 200 TABLET ORAL at 19:58

## 2021-05-05 RX ADMIN — CLONIDINE HYDROCHLORIDE 0.1 MG: 0.1 TABLET ORAL at 13:39

## 2021-05-05 RX ADMIN — MEROPENEM 1000 MG: 1 INJECTION, POWDER, FOR SOLUTION INTRAVENOUS at 20:01

## 2021-05-05 RX ADMIN — Medication 10 ML: at 20:00

## 2021-05-05 RX ADMIN — OXYCODONE HYDROCHLORIDE 10 MG: 5 TABLET ORAL at 13:42

## 2021-05-05 RX ADMIN — MULTIPLE VITAMINS W/ MINERALS TAB 1 TABLET: TAB at 08:44

## 2021-05-05 RX ADMIN — Medication 100 MG: at 08:44

## 2021-05-05 RX ADMIN — CLONIDINE HYDROCHLORIDE 0.1 MG: 0.1 TABLET ORAL at 08:44

## 2021-05-05 RX ADMIN — OXYCODONE HYDROCHLORIDE 10 MG: 5 TABLET ORAL at 08:44

## 2021-05-05 RX ADMIN — QUETIAPINE FUMARATE 200 MG: 200 TABLET ORAL at 13:39

## 2021-05-05 RX ADMIN — PANCRELIPASE 72000 UNITS: 24000; 76000; 120000 CAPSULE, DELAYED RELEASE PELLETS ORAL at 08:47

## 2021-05-05 RX ADMIN — PANTOPRAZOLE SODIUM 40 MG: 40 TABLET, DELAYED RELEASE ORAL at 06:33

## 2021-05-05 ASSESSMENT — PAIN SCALES - GENERAL
PAINLEVEL_OUTOF10: 9
PAINLEVEL_OUTOF10: 7
PAINLEVEL_OUTOF10: 8
PAINLEVEL_OUTOF10: 7
PAINLEVEL_OUTOF10: 3

## 2021-05-05 NOTE — PROGRESS NOTES
Pulmonary Progress Note  CC: Hypoxemia and pancreatitis    Subjective:     Fever    EXAM:   /72   Pulse 102   Temp 100 °F (37.8 °C) (Oral)   Resp 18   Ht 6' (1.829 m)   Wt 165 lb 11.2 oz (75.2 kg)   SpO2 93%   BMI 22.47 kg/m²  on 2 L  Constitutional:  No acute distress   HEENT: no scleral icterus  Neck: No tracheal deviation present. Cardiovascular: Normal heart sounds. Pulmonary/Chest: No wheezes. No rhonchi. No rales. + decreased breath sounds. No accessory muscle usage or stridor. Abdominal: Soft. Musculoskeletal: No cyanosis. No clubbing. Skin: Skin is warm and dry. Scheduled Meds:   lidocaine  1 patch Transdermal Daily    enoxaparin  40 mg Subcutaneous Daily    pantoprazole  40 mg Oral BID AC    lipase-protease-amylase  72,000 Units Oral TID WC    meropenem  1,000 mg Intravenous Q8H    pentoxifylline  400 mg Oral TID WC    cloNIDine  0.1 mg Oral TID    sodium chloride flush  5-40 mL Intravenous 2 times per day    thiamine  100 mg Oral Daily    multivitamin  1 tablet Oral Daily    QUEtiapine  200 mg Oral TID    nicotine  1 patch Transdermal Daily     Continuous Infusions:   dextrose      sodium chloride 25 mL (04/24/21 0059)     PRN Meds:  oxyCODONE **OR** oxyCODONE, glucose, dextrose, glucagon (rDNA), dextrose, labetalol, sodium chloride flush, sodium chloride, acetaminophen, promethazine **OR** ondansetron, potassium chloride **OR** potassium alternative oral replacement **OR** potassium chloride, magnesium sulfate    Labs:  CBC:   Recent Labs     05/03/21 0516 05/04/21 0558 05/05/21 0537   WBC 7.6 8.5 8.2   HGB 8.0* 8.3* 8.7*   HCT 23.6* 24.3* 25.4*   MCV 99.5 100.2* 99.2   * 764* 850*     BMP:   Recent Labs     05/03/21 0516 05/04/21 0558 05/05/21 0537    139 138   K 3.1* 3.7 3.6   CL 99 102 101   CO2 27 30 27   PHOS 3.2 3.2 3.1   BUN <2* <2* 3*   CREATININE <0.5* <0.5* 0.6*     5/1/2021 CXR was personally reviewed by me:  In comparison with prior film, patient has developed small bilateral pleural effusions along with bibasilar atelectasis and interstitial edema. 5/4/2021 CT abdomen  1. Imaging features of interstitial edematous pancreatitis possibly with   associated duodenitis in the pancreatic head.  There is similar free fluid in   the abdomen and pelvis extending along the right retroperitoneum and   iliopsoas muscle.  This is stable. 2. Lobulated fluid collection associated with the pancreatic body likely   pseudocyst related to previous episode of pancreatitis. 3. Diffuse hepatic steatosis; a hypervascular focus in the left hepatic lobe   may represent portal hepatic shunt. 4. Stable 0.3 cm nodule in the left lower lobe.      Microbiology: SARS-CoV-2 NAAT negative x2    ASSESSMENT:  · Acute hypoxemic respiratory failure  · Fever  · Recurrent acute alcoholic pancreatitis  · Pleural effusions - likely reactive to pancreatitis  · Ongoing alcohol abuse  · Ongoing tobacco abuse    PLAN:  · Supplemental oxygen to maintain SaO2 >92%; wean as tolerated    · Management of acute pancreatitis per internal medicine and gastroenterology  · Repeat Lasix 40mg IV x 1  · On Librium  · On Merrem  · Agree with recommendations for alcohol and tobacco cessation  · CT showed developing pseudocyst

## 2021-05-05 NOTE — PROGRESS NOTES
Hospitalist Progress Note      PCP: Melania Saldivar MD    Date of Admission: 4/20/2021    Subjective: Eneida Trotter continues to feel better overall, but remains hypoxic and febrile   - tolerating low fat diet   - repeat CT abd 5/4 showed areas of necrosis and abscess. Dr. Levon Min discussed with Dr. Pauly Valles at [facility] who recommends transfer. transfer to  has been initiated and pt accepted, they are discharge dependent    Medications:  Reviewed    Infusion Medications    dextrose      sodium chloride 25 mL (04/24/21 0059)     Scheduled Medications    lidocaine  1 patch Transdermal Daily    enoxaparin  40 mg Subcutaneous Daily    pantoprazole  40 mg Oral BID AC    lipase-protease-amylase  72,000 Units Oral TID WC    meropenem  1,000 mg Intravenous Q8H    pentoxifylline  400 mg Oral TID WC    cloNIDine  0.1 mg Oral TID    sodium chloride flush  5-40 mL Intravenous 2 times per day    thiamine  100 mg Oral Daily    multivitamin  1 tablet Oral Daily    QUEtiapine  200 mg Oral TID    nicotine  1 patch Transdermal Daily     PRN Meds: oxyCODONE **OR** oxyCODONE, glucose, dextrose, glucagon (rDNA), dextrose, labetalol, sodium chloride flush, sodium chloride, acetaminophen, promethazine **OR** ondansetron, potassium chloride **OR** potassium alternative oral replacement **OR** potassium chloride, magnesium sulfate      Intake/Output Summary (Last 24 hours) at 5/5/2021 1016  Last data filed at 5/5/2021 0415  Gross per 24 hour   Intake 700 ml   Output 1900 ml   Net -1200 ml       Physical Exam Performed:    /72   Pulse 102   Temp 100 °F (37.8 °C) (Oral)   Resp 18   Ht 6' (1.829 m)   Wt 165 lb 11.2 oz (75.2 kg)   SpO2 93%   BMI 22.47 kg/m²     Gen: Disheveled appearing male. No distress. Alert. Eyes: PERRL. No sclera icterus. No conjunctival injection. ENT: No discharge. Pharynx clear. Neck: No JVD. Trachea midline. Resp: No accessory muscle use. No crackles. No wheezes. No rhonchi.    Diminished breath sounds bilaterally, more so in the bases   CV: Regular rate. Regular rhythm. No murmur. No rub. No edema. GI: Mildly distended abdomen with tender epigastrium. BS active   Skin: Warm and dry. No nodule on exposed extremities. No rash on exposed extremities. M/S: No cyanosis. No joint deformity. No clubbing. Neuro: Awake. Grossly nonfocal    Psych: Oriented x 3. No anxiety or agitation. Labs:   Recent Labs     05/03/21  0516 05/04/21  0558 05/05/21  0537   WBC 7.6 8.5 8.2   HGB 8.0* 8.3* 8.7*   HCT 23.6* 24.3* 25.4*   * 764* 850*     Recent Labs     05/03/21  0516 05/04/21  0558 05/05/21  0537    139 138   K 3.1* 3.7 3.6   CL 99 102 101   CO2 27 30 27   BUN <2* <2* 3*   CREATININE <0.5* <0.5* 0.6*   CALCIUM 8.7 8.9 9.0   PHOS 3.2 3.2 3.1     Recent Labs     05/03/21  0516 05/04/21  0558 05/05/21  0537   AST 27 21 21   ALT 44* 35 31   BILITOT 0.3 0.3 0.3   ALKPHOS 81 85 86     No results for input(s): INR in the last 72 hours. No results for input(s): Rejeana Kemps in the last 72 hours. Urinalysis:      Lab Results   Component Value Date    NITRU Negative 04/23/2021    WBCUA 0-2 04/23/2021    BACTERIA 1+ 04/23/2021    RBCUA 3-4 04/23/2021    BLOODU MODERATE 04/23/2021    SPECGRAV >=1.030 04/23/2021    GLUCOSEU Negative 04/23/2021       Radiology:  CT ABDOMEN PELVIS W IV CONTRAST Additional Contrast? None   Final Result   Acute pancreatitis with heterogeneous enhancement of the pancreas suggesting   possible areas of necrosis. Developing more focal collections in the region of the pancreatic head and   left pericolic gutters suggest pseudocyst or abscess formation. Diffuse peripancreatic fluid appears slightly more; less than comparison   study. Slight improved aeration of the lung bases with persistent pleuroparenchymal   changes. XR CHEST (2 VW)   Final Result   1. Small bilateral pleural effusions.    2. Patchy bilateral subpleural opacities raising concern for an   atypical/viral pneumonia. CT ABDOMEN PELVIS W IV CONTRAST Additional Contrast? None   Final Result   Severe acute pancreatitis which has significantly worsened since 04/21/2021. Focal areas of the pancreas which do not enhance and are worrisome for   pancreatic necrosis. Suspected developing pseudocyst in the magalie hepatis. Large amount of inflammatory ascites in the upper abdomen and left pericolic   gutter extending into the pelvis. Feeding tube in place. IR GI TUBE W FLUOROSCOPY   Final Result   Successful placement of a post pyloric feeding tube. The tube was secured at   the 105 cm natty. IR GI TUBE W FLUOROSCOPY   Final Result   Successful placement of a post pyloric feeding tube. The tube was secured to   the nose at the 95 cm natty         CT ABDOMEN PELVIS W IV CONTRAST Additional Contrast? None   Final Result   Acute pancreatitis with questionable developing necrosis in the pancreatic   tail. Fluid collection abutting the inferior aspect of the pancreatic head likely a   pseudocyst or abscess. Marked diffuse hepatic steatosis. Yarely Millan have reviewed the chart on [facility] and personally interviewed and examined patient, reviewed the data (labs and imaging) and after discussion with my PA formulated the plan. Agree with note with the following edits. HPI:     Having some epigastric pain. Is hypoxic. He is a smoker. No chest pain. He is a smoker. 5/2-much improved with regards to abdominal pain. Still on 3 L of oxygen. 5/3- tolerating a diet. Down to 1 L of oxygen. 5/4- spiked a fever. Hypoxic on room air-85% standing up. 5/5- CT noted. Plans to send to     I reviewed the patient's Past Medical History, Past Surgical History, Medications, and Allergies.      Physical exam:    /72   Pulse 102   Temp 100 °F (37.8 °C) (Oral)   Resp 18   Ht 6' (1.829 m)   Wt 165 lb 11.2 oz (75.2 kg)   SpO2 93% BMI 22.47 kg/m²     Gen: No distress. Alert. Eyes: PERRL. No sclera icterus. No conjunctival injection. ENT: No discharge. Pharynx clear. Neck: Trachea midline. Normal thyroid. Resp: No accessory muscle use. No crackles. No wheezes. No rhonchi. No dullness on percussion. CV: Regular rate. Regular rhythm. No murmur or rub. No edema. GI: epigastric tenderness. Non-distended. No masses. No organomegaly. Normal bowel sounds. No hernia. Assessment/Plan:    #Acute Recurrent Alcoholic Pancreatitis, severe with abscess and necrosis   - Pt f/w GI, he remotely underwent EUS with FNA on 8/20/20 which was neg for malignant cells with features suggestive of fat necrosis  - CT abd on admit showed possible necrosis and abscess. GI was consulted - added creon and pentoxifylline   - initially treated with aggressive IVF and bowel rest, zosyn started for possible abscess  - NJ tube placed x 2 and received post pyloric feeds x 1 day, but tube came out again on 4/28. Now on low fat diet and has been tolerating well   - He had persistent fevers and therefore zosyn was stopped and merrem started (D#8). On 5/4 a repeat CT abd was obtained which showed necrosis and more focal collections consistent with abscess or pseudocyst formation. Dr. Cl Agustin discussed case with Dr. Amelia Schwartz at [facility], pt has been accepted for transfer to [facility] (discharge dependent at this time). - abstinence from alcohol strongly advised daily throughout admission      #Hypoxia - improving   - patient was 80% on RA . CXR with bilateral effusions and subpleural opacities   - suspect related to acute pancreatitis, not meeting criteria for ARDS   - he has had rapid COVID 19 x 2 since admission which are both negative- I do not suspect COVID 19 as the cause   - started IS  - pulmonary consultation- appreciate recs - sp IV lasix on 5/3 and 5/4.   [facility] documentation is not accurate, he continues to empty his own urinal   - Today he is stable on RA at rest #Leukocytosis - Resolved  - likely 2/2 above     #Hypokalemia, Hypophosphatemia and hypomagnesemia  - cont to replete aggressively     #Hepatic Steatosis  #Elevated LFTs  - noted on CT abdomen  - LFTs trending down, monitor  - encouraged  alcohol cessation     #Alcohol Abuse  #Alcohol Withdrawal- resolved   - last drink was 4/20  - fall and seizure precautions in place   - sp CIWA, now off. Weaned off Librium- last dose 5/5/21     #Tachycardia - improved   - Started clonidine on admission at 0.1 mg TID and continued   - tachycardia likely from alcohol withdrawal syndrome & pancreatitis     #Hyponatremia - resolved   - monitor with IV hydration  - Improved; sodium is up to 138 today      #Normocytic anemia   - Hb 17 on admit (suspect with dehydration)-> 8 and remaining stable at 8 for the past few days  - no apparent bleeding  - iron studies with elevated ferritin, but low Iron      #Thrombocytosis  - suspect reactive, monitoring CBC daily. If no improvement will consider GI consultation    DVT Prophylaxis: Lovenox. Diet: DIET LOW FAT;   Dietary Nutrition Supplements: Clear Liquid Oral Supplement  Code Status: Full Code    Ygnacio Boxer PA-C  5/5/2021 10:16 AM      COLIN Chapin 5/5/21

## 2021-05-05 NOTE — PLAN OF CARE
Problem: Pain:  Goal: Pain level will decrease  Description: Pain level will decrease  Outcome: Ongoing  Goal: Control of acute pain  Description: Control of acute pain  Outcome: Ongoing  Goal: Control of chronic pain  Description: Control of chronic pain  Outcome: Ongoing  Goal: Patient's pain/discomfort is manageable  Description: Patient's pain/discomfort is manageable  Outcome: Ongoing     Problem: Infection:  Goal: Will remain free from infection  Description: Will remain free from infection  Outcome: Ongoing     Problem: Safety:  Goal: Free from accidental physical injury  Description: Free from accidental physical injury  Outcome: Ongoing  Goal: Free from intentional harm  Description: Free from intentional harm  Outcome: Ongoing     Problem: Daily Care:  Goal: Daily care needs are met  Description: Daily care needs are met  Outcome: Ongoing     Problem: Skin Integrity:  Goal: Skin integrity will stabilize  Description: Skin integrity will stabilize  Outcome: Ongoing     Problem: Discharge Planning:  Goal: Patients continuum of care needs are met  Description: Patients continuum of care needs are met  Outcome: Ongoing     Problem: Nutrition  Goal: Optimal nutrition therapy  5/4/2021 2152 by Deep Ohara RN  Outcome: Ongoing  5/4/2021 1208 by Toi Chaudhari RD, LD  Outcome: Ongoing  Goal: Understanding of nutritional guidelines  5/4/2021 2152 by Deep Ohara RN  Outcome: Ongoing  5/4/2021 1208 by Toi Chaudhari RD, LD  Outcome: Ongoing

## 2021-05-05 NOTE — CARE COORDINATION
INTERDISCIPLINARY PLAN OF CARE CONFERENCE    Date/Time: 5/5/2021 9:40 AM  Completed by: Vivi Templeton, Case Management      Patient Name:  Magan Ramos  YOB: 1975  Admitting Diagnosis: Acute recurrent pancreatitis [K85.90]     Admit Date/Time:  4/20/2021 10:47 PM    Chart reviewed. Interdisciplinary team contacted or reviewed plan related to patient progress and discharge plans. Disciplines included Case Management, Nursing, and Dietitian. Current Status: 04/21/2021  PT/OT recommendation for discharge plan of care: NA    Expected D/C Disposition:  TXF to [facility]  Confirmed plan with patient   Discharge Plan Comments: Await bed at [facility].

## 2021-05-05 NOTE — DISCHARGE SUMMARY
5/5.  UC was discharge dependent and patient was monitored at our facility for another 48 hours with significant improvement and resolution of fevers. There are still no open beds at [facility]. We will dc him home on Creon with instruction to see Dr. Alex Nascimento at [facility] as outpatient. He is going home to stay with his father who is very supportive in his cessation from alcohol. I had a discussion with the patient's father and need for follow up on day of discharge. abstinence from alcohol strongly advised daily throughout admission to the patient       #Hypoxia - resolved   - patient was 80% on RA . CXR with bilateral effusions and subpleural opacities   - suspect related to acute pancreatitis, not meeting criteria for ARDS   - he has had rapid COVID 19 x 2 since admission which are both negative- I do not suspect COVID 19 as the cause   - started IS  - pulmonary consultation- appreciate recs - sp IV lasix on 5/3 and 5/4. UOP documentation is not accurate, he continues to empty his own urinal   - Today he is stable on RA     #Leukocytosis - Resolved  - likely 2/2 above     #Hypokalemia, Hypophosphatemia and hypomagnesemia  - replaced throughout admission      #Hepatic Steatosis  #Elevated LFTs- resolved  - noted on CT abdomen  - LFTs trending down, monitored  - encouraged  alcohol cessation     #Alcohol Abuse  #Alcohol Withdrawal- resolved   - last drink was 4/20  - fall and seizure precautions in place   - sp CIWA, now off. Weaned off Librium- last dose 5/5/21.   He intermittently gets rx for Valium as outpatient (confirmed with [facility]), this can be continued on discharge, but it is not recommended if the patient continues to drink alcohol      #Tachycardia - improved   - Started clonidine on admission at 0.1 mg TID and continued - he improved and BP stable, will stop clonidine at discharge     #Hyponatremia - resolved   - monitor with IV hydration  - Improved; sodium is up to 138 today      #Normocytic anemia   - Hb 17 on Negative 04/23/2021    BLOODU MODERATE 04/23/2021    GLUCOSEU Negative 04/23/2021    AMORPHOUS 1+ 06/05/2018         CULTURES  Blood: NGTD  COVID: Not detected    RADIOLOGY  CT ABDOMEN PELVIS W IV CONTRAST Additional Contrast? None   Final Result   Acute pancreatitis with heterogeneous enhancement of the pancreas suggesting   possible areas of necrosis. Developing more focal collections in the region of the pancreatic head and   left pericolic gutters suggest pseudocyst or abscess formation. Diffuse peripancreatic fluid appears slightly more; less than comparison   study. Slight improved aeration of the lung bases with persistent pleuroparenchymal   changes. XR CHEST (2 VW)   Final Result   1. Small bilateral pleural effusions. 2. Patchy bilateral subpleural opacities raising concern for an   atypical/viral pneumonia. CT ABDOMEN PELVIS W IV CONTRAST Additional Contrast? None   Final Result   Severe acute pancreatitis which has significantly worsened since 04/21/2021. Focal areas of the pancreas which do not enhance and are worrisome for   pancreatic necrosis. Suspected developing pseudocyst in the magalie hepatis. Large amount of inflammatory ascites in the upper abdomen and left pericolic   gutter extending into the pelvis. Feeding tube in place. IR GI TUBE W FLUOROSCOPY   Final Result   Successful placement of a post pyloric feeding tube. The tube was secured at   the 105 cm natty. IR GI TUBE W FLUOROSCOPY   Final Result   Successful placement of a post pyloric feeding tube. The tube was secured to   the nose at the 95 cm natty         CT ABDOMEN PELVIS W IV CONTRAST Additional Contrast? None   Final Result   Acute pancreatitis with questionable developing necrosis in the pancreatic   tail. Fluid collection abutting the inferior aspect of the pancreatic head likely a   pseudocyst or abscess. Marked diffuse hepatic steatosis.                Discharge Medications     Medication List      START taking these medications    lipase-protease-amylase [id number] units delayed release capsule  Commonly known as: CREON  Take by mouth 3 times daily (with meals)        CHANGE how you take these medications    QUEtiapine 50 MG tablet  Commonly known as: SEROquel  Take 1 tablet by mouth 3 times daily for 14 days  What changed: how much to take        CONTINUE taking these medications    diazePAM 10 MG tablet  Commonly known as: VALIUM     omeprazole 20 MG delayed release capsule  Commonly known as: PRILOSEC     Trintellix 5 MG tablet  Generic drug: VORTIoxetine           Where to Get Your Medications      These medications were sent to [facility]/pharmacy [facility], [address], [address] [place] [address]    Phone: [phone]   · lipase-protease-amylase [id number] units delayed release capsule         Dispo: Dc to home with his father in stable condition.   F/u with PCP 1-2 weeks, Dr Naren Heaton at [facility], and hem/onc 1-2 weeks    Maeve Lange PA-C  5/7/2021 2:59 PM       More than 30 minutes spent      [address] 5/10/2021 12:00 PM

## 2021-05-05 NOTE — PROGRESS NOTES
Shift assessment complete. See doc flow. Nightly medications given see MAR. Clonidine held due to soft /71 HR 88. Patient resting in bed quietly. Patient c/o abdominal pain 7/10 PRN Oxy given. Cont pulse ox placed on finger & secured. Patient up as tolerated. Call light and bedside table within easy reach. Will continue to monitor.

## 2021-05-06 LAB
A/G RATIO: 0.8 (ref 1.1–2.2)
ALBUMIN SERPL-MCNC: 2.5 G/DL (ref 3.4–5)
ALP BLD-CCNC: 84 U/L (ref 40–129)
ALT SERPL-CCNC: 24 U/L (ref 10–40)
ANION GAP SERPL CALCULATED.3IONS-SCNC: 9 MMOL/L (ref 3–16)
AST SERPL-CCNC: 18 U/L (ref 15–37)
BILIRUB SERPL-MCNC: 0.5 MG/DL (ref 0–1)
BUN BLDV-MCNC: 3 MG/DL (ref 7–20)
CALCIUM SERPL-MCNC: 8.8 MG/DL (ref 8.3–10.6)
CHLORIDE BLD-SCNC: 99 MMOL/L (ref 99–110)
CO2: 28 MMOL/L (ref 21–32)
CREAT SERPL-MCNC: <0.5 MG/DL (ref 0.9–1.3)
GFR AFRICAN AMERICAN: >60
GFR NON-AFRICAN AMERICAN: >60
GLOBULIN: 3 G/DL
GLUCOSE BLD-MCNC: 104 MG/DL (ref 70–99)
GLUCOSE BLD-MCNC: 111 MG/DL (ref 70–99)
GLUCOSE BLD-MCNC: 113 MG/DL (ref 70–99)
GLUCOSE BLD-MCNC: 97 MG/DL (ref 70–99)
HCT VFR BLD CALC: 24 % (ref 40.5–52.5)
HEMOGLOBIN: 8.2 G/DL (ref 13.5–17.5)
MCH RBC QN AUTO: 34 PG (ref 26–34)
MCHC RBC AUTO-ENTMCNC: 34.2 G/DL (ref 31–36)
MCV RBC AUTO: 99.5 FL (ref 80–100)
PDW BLD-RTO: 16.4 % (ref 12.4–15.4)
PERFORMED ON: ABNORMAL
PERFORMED ON: ABNORMAL
PERFORMED ON: NORMAL
PHOSPHORUS: 3.2 MG/DL (ref 2.5–4.9)
PLATELET # BLD: 866 K/UL (ref 135–450)
PMV BLD AUTO: 7.3 FL (ref 5–10.5)
POTASSIUM REFLEX MAGNESIUM: 4 MMOL/L (ref 3.5–5.1)
RBC # BLD: 2.41 M/UL (ref 4.2–5.9)
SODIUM BLD-SCNC: 136 MMOL/L (ref 136–145)
TOTAL PROTEIN: 5.5 G/DL (ref 6.4–8.2)
WBC # BLD: 6.9 K/UL (ref 4–11)

## 2021-05-06 PROCEDURE — 6370000000 HC RX 637 (ALT 250 FOR IP): Performed by: INTERNAL MEDICINE

## 2021-05-06 PROCEDURE — 2580000003 HC RX 258: Performed by: INTERNAL MEDICINE

## 2021-05-06 PROCEDURE — 1200000000 HC SEMI PRIVATE

## 2021-05-06 PROCEDURE — 99232 SBSQ HOSP IP/OBS MODERATE 35: CPT | Performed by: INTERNAL MEDICINE

## 2021-05-06 PROCEDURE — 80053 COMPREHEN METABOLIC PANEL: CPT

## 2021-05-06 PROCEDURE — 6370000000 HC RX 637 (ALT 250 FOR IP): Performed by: PHYSICIAN ASSISTANT

## 2021-05-06 PROCEDURE — 6360000002 HC RX W HCPCS: Performed by: INTERNAL MEDICINE

## 2021-05-06 PROCEDURE — 2700000000 HC OXYGEN THERAPY PER DAY

## 2021-05-06 PROCEDURE — 6370000000 HC RX 637 (ALT 250 FOR IP): Performed by: NURSE PRACTITIONER

## 2021-05-06 PROCEDURE — 84100 ASSAY OF PHOSPHORUS: CPT

## 2021-05-06 PROCEDURE — 36415 COLL VENOUS BLD VENIPUNCTURE: CPT

## 2021-05-06 PROCEDURE — 6370000000 HC RX 637 (ALT 250 FOR IP)

## 2021-05-06 PROCEDURE — 2580000003 HC RX 258: Performed by: PHYSICIAN ASSISTANT

## 2021-05-06 PROCEDURE — 85027 COMPLETE CBC AUTOMATED: CPT

## 2021-05-06 PROCEDURE — 94761 N-INVAS EAR/PLS OXIMETRY MLT: CPT

## 2021-05-06 RX ORDER — DIMETHICONE, OXYBENZONE, AND PADIMATE O 2; 2.5; 6.6 G/100G; G/100G; G/100G
STICK TOPICAL
Status: COMPLETED
Start: 2021-05-06 | End: 2021-05-06

## 2021-05-06 RX ORDER — DIAZEPAM 5 MG/1
10 TABLET ORAL ONCE
Status: COMPLETED | OUTPATIENT
Start: 2021-05-06 | End: 2021-05-06

## 2021-05-06 RX ADMIN — MEROPENEM 1000 MG: 1 INJECTION, POWDER, FOR SOLUTION INTRAVENOUS at 13:36

## 2021-05-06 RX ADMIN — CLONIDINE HYDROCHLORIDE 0.1 MG: 0.1 TABLET ORAL at 20:24

## 2021-05-06 RX ADMIN — PENTOXIFYLLINE 400 MG: 400 TABLET, FILM COATED, EXTENDED RELEASE ORAL at 11:45

## 2021-05-06 RX ADMIN — CLONIDINE HYDROCHLORIDE 0.1 MG: 0.1 TABLET ORAL at 09:02

## 2021-05-06 RX ADMIN — PANCRELIPASE 72000 UNITS: 24000; 76000; 120000 CAPSULE, DELAYED RELEASE PELLETS ORAL at 16:59

## 2021-05-06 RX ADMIN — Medication: at 20:24

## 2021-05-06 RX ADMIN — QUETIAPINE FUMARATE 200 MG: 200 TABLET ORAL at 09:02

## 2021-05-06 RX ADMIN — PANCRELIPASE 72000 UNITS: 24000; 76000; 120000 CAPSULE, DELAYED RELEASE PELLETS ORAL at 11:45

## 2021-05-06 RX ADMIN — MULTIPLE VITAMINS W/ MINERALS TAB 1 TABLET: TAB at 09:02

## 2021-05-06 RX ADMIN — Medication 10 ML: at 09:10

## 2021-05-06 RX ADMIN — OXYCODONE HYDROCHLORIDE 10 MG: 5 TABLET ORAL at 23:43

## 2021-05-06 RX ADMIN — MEROPENEM 1000 MG: 1 INJECTION, POWDER, FOR SOLUTION INTRAVENOUS at 20:24

## 2021-05-06 RX ADMIN — PROMETHAZINE HYDROCHLORIDE 12.5 MG: 25 TABLET ORAL at 01:34

## 2021-05-06 RX ADMIN — OXYCODONE HYDROCHLORIDE 10 MG: 5 TABLET ORAL at 01:34

## 2021-05-06 RX ADMIN — Medication 100 MG: at 09:02

## 2021-05-06 RX ADMIN — Medication 10 ML: at 20:26

## 2021-05-06 RX ADMIN — OXYCODONE HYDROCHLORIDE 10 MG: 5 TABLET ORAL at 05:41

## 2021-05-06 RX ADMIN — PANTOPRAZOLE SODIUM 40 MG: 40 TABLET, DELAYED RELEASE ORAL at 05:41

## 2021-05-06 RX ADMIN — PANTOPRAZOLE SODIUM 40 MG: 40 TABLET, DELAYED RELEASE ORAL at 16:59

## 2021-05-06 RX ADMIN — PENTOXIFYLLINE 400 MG: 400 TABLET, FILM COATED, EXTENDED RELEASE ORAL at 16:59

## 2021-05-06 RX ADMIN — PENTOXIFYLLINE 400 MG: 400 TABLET, FILM COATED, EXTENDED RELEASE ORAL at 09:02

## 2021-05-06 RX ADMIN — CLONIDINE HYDROCHLORIDE 0.1 MG: 0.1 TABLET ORAL at 13:36

## 2021-05-06 RX ADMIN — ENOXAPARIN SODIUM 40 MG: 40 INJECTION SUBCUTANEOUS at 09:02

## 2021-05-06 RX ADMIN — QUETIAPINE FUMARATE 200 MG: 200 TABLET ORAL at 20:24

## 2021-05-06 RX ADMIN — MEROPENEM 1000 MG: 1 INJECTION, POWDER, FOR SOLUTION INTRAVENOUS at 05:41

## 2021-05-06 RX ADMIN — DIAZEPAM 10 MG: 5 TABLET ORAL at 09:02

## 2021-05-06 RX ADMIN — OXYCODONE HYDROCHLORIDE 5 MG: 5 TABLET ORAL at 19:25

## 2021-05-06 RX ADMIN — QUETIAPINE FUMARATE 200 MG: 200 TABLET ORAL at 13:36

## 2021-05-06 RX ADMIN — PANCRELIPASE 72000 UNITS: 24000; 76000; 120000 CAPSULE, DELAYED RELEASE PELLETS ORAL at 09:02

## 2021-05-06 RX ADMIN — OXYCODONE HYDROCHLORIDE 10 MG: 5 TABLET ORAL at 11:45

## 2021-05-06 ASSESSMENT — PAIN SCALES - GENERAL
PAINLEVEL_OUTOF10: 9
PAINLEVEL_OUTOF10: 8
PAINLEVEL_OUTOF10: 9
PAINLEVEL_OUTOF10: 6
PAINLEVEL_OUTOF10: 8

## 2021-05-06 NOTE — PROGRESS NOTES
Pulmonary Progress Note  CC: Hypoxemia and pancreatitis    Subjective:   O2 improved. Still having fevers    EXAM:   /68   Pulse 99   Temp 99.4 °F (37.4 °C) (Oral)   Resp 16   Ht 6' (1.829 m)   Wt 165 lb 11.2 oz (75.2 kg)   SpO2 97%   BMI 22.47 kg/m²  on 1 L  Constitutional:  No acute distress   HEENT: no scleral icterus  Neck: No tracheal deviation present. Cardiovascular: Normal heart sounds. Pulmonary/Chest: No wheezes. No rhonchi. No rales. + decreased breath sounds. No accessory muscle usage or stridor. Abdominal: Soft. Musculoskeletal: No cyanosis. No clubbing. Skin: Skin is warm and dry. Scheduled Meds:   lidocaine  1 patch Transdermal Daily    enoxaparin  40 mg Subcutaneous Daily    pantoprazole  40 mg Oral BID AC    lipase-protease-amylase  72,000 Units Oral TID WC    meropenem  1,000 mg Intravenous Q8H    pentoxifylline  400 mg Oral TID WC    cloNIDine  0.1 mg Oral TID    sodium chloride flush  5-40 mL Intravenous 2 times per day    thiamine  100 mg Oral Daily    multivitamin  1 tablet Oral Daily    QUEtiapine  200 mg Oral TID    nicotine  1 patch Transdermal Daily     Continuous Infusions:   dextrose      sodium chloride 25 mL (04/24/21 0059)     PRN Meds:  oxyCODONE **OR** oxyCODONE, glucose, dextrose, glucagon (rDNA), dextrose, labetalol, sodium chloride flush, sodium chloride, acetaminophen, promethazine **OR** ondansetron, potassium chloride **OR** potassium alternative oral replacement **OR** potassium chloride, magnesium sulfate    Labs:  CBC:   Recent Labs     05/04/21 0558 05/05/21  0537 05/06/21  0605   WBC 8.5 8.2 6.9   HGB 8.3* 8.7* 8.2*   HCT 24.3* 25.4* 24.0*   .2* 99.2 99.5   * 850* 866*     BMP:   Recent Labs     05/04/21  0558 05/05/21  0537 05/06/21  0605    138 136   K 3.7 3.6 4.0    101 99   CO2 30 27 28   PHOS 3.2 3.1 3.2   BUN <2* 3* 3*   CREATININE <0.5* 0.6* <0.5*     5/1/2021 CXR was personally reviewed by me:  In comparison with prior film, patient has developed small bilateral pleural effusions along with bibasilar atelectasis and interstitial edema. 4/27/2021 CT abdomen lung windows show moderate right and small left pleural effusion along with atelectasis. Microbiology: SARS-CoV-2 NAAT negative x2    ASSESSMENT:  · Acute hypoxemic respiratory failure  · Abnormal chest x-ray: Clinically this is all consistent with acute pancreatitis causing respiratory failure. Patient does not meet criteria for ARDS. However, close observation in the hospital is warranted. Clinical picture is not suggestive of Covid pneumonia. I feel his imaging is entirely explained by acute pancreatitis with interstitial edema and bilateral effusions, best seen on the CT imaging.   · Recurrent acute alcoholic pancreatitis  · Electrolytes disorder  · Ongoing alcohol abuse  · Ongoing tobacco abuse    PLAN:  · Supplemental oxygen to maintain SaO2 >92%; wean as tolerated    · Management of acute pancreatitis per internal medicine and gastroenterology  · Agree with recommendations for alcohol and tobacco cessation  · Please call with questions

## 2021-05-06 NOTE — FLOWSHEET NOTE
initiated visit with Pt. Listened and offered emotional support. Pt shared about frustrations with illness and struggles.  acknowledged and affirmed Pt's emotions. Prayed with Pt for his care and support, per his request.    [address] [facility] Associate       05/06/21 3937   Encounter Summary   Services provided to: Patient   Referral/Consult From: Rounding   Continue Visiting   (5/6 initial, sppt and prayer)   Complexity of Encounter Moderate   Length of Encounter 15 minutes   Spiritual Assessment Completed Yes   Spiritual/Jew   Type Spiritual support   Assessment Calm; Approachable;Helplessness;Coping   Intervention Active listening;Explored feelings, thoughts, concerns;Prayer;Discussed illness/injury and it's impact   Outcome Expressed gratitude;Engaged in conversation;Expressed feelings/needs/concerns; Shared reminiscences

## 2021-05-06 NOTE — PLAN OF CARE
Problem: Pain:  Goal: Pain level will decrease  Description: Pain level will decrease  Outcome: Ongoing  Goal: Control of acute pain  Description: Control of acute pain  Outcome: Ongoing     Problem: Infection:  Goal: Will remain free from infection  Description: Will remain free from infection  Outcome: Ongoing

## 2021-05-06 NOTE — PROGRESS NOTES
Hospitalist Progress Note      PCP: Ruperto Marques MD    Date of Admission: 4/20/2021    Subjective: Daniele Posey continues to feel better overall, but remains hypoxic and febrile   - tolerating low fat diet   - repeat CT abd 5/4 showed areas of necrosis and abscess. Dr. Hampton Due discussed with Dr. Rosy Vargas at [facility] who recommends transfer. transfer to  has been initiated and pt accepted, they are discharge dependent    5/6-  Awaiting transfer to . Medications:  Reviewed    Infusion Medications    dextrose      sodium chloride 25 mL (04/24/21 0059)     Scheduled Medications    lidocaine  1 patch Transdermal Daily    enoxaparin  40 mg Subcutaneous Daily    pantoprazole  40 mg Oral BID AC    lipase-protease-amylase  72,000 Units Oral TID WC    meropenem  1,000 mg Intravenous Q8H    pentoxifylline  400 mg Oral TID WC    cloNIDine  0.1 mg Oral TID    sodium chloride flush  5-40 mL Intravenous 2 times per day    thiamine  100 mg Oral Daily    multivitamin  1 tablet Oral Daily    QUEtiapine  200 mg Oral TID    nicotine  1 patch Transdermal Daily     PRN Meds: oxyCODONE **OR** oxyCODONE, glucose, dextrose, glucagon (rDNA), dextrose, labetalol, sodium chloride flush, sodium chloride, acetaminophen, promethazine **OR** ondansetron, potassium chloride **OR** potassium alternative oral replacement **OR** potassium chloride, magnesium sulfate      Intake/Output Summary (Last 24 hours) at 5/6/2021 1108  Last data filed at 5/6/2021 0216  Gross per 24 hour   Intake 180 ml   Output 300 ml   Net -120 ml       Physical Exam Performed:    /78   Pulse 93   Temp 99 °F (37.2 °C) (Oral)   Resp 16   Ht 6' (1.829 m)   Wt 165 lb 11.2 oz (75.2 kg)   SpO2 96%   BMI 22.47 kg/m²     Gen: Disheveled appearing male. No distress. Alert. Eyes: PERRL. No sclera icterus. No conjunctival injection. ENT: No discharge. Pharynx clear. Neck: No JVD. Trachea midline. Resp: No accessory muscle use. No crackles. No wheezes. No rhonchi. Diminished breath sounds bilaterally, more so in the bases   CV: Regular rate. Regular rhythm. No murmur. No rub. No edema. GI: Mildly distended abdomen with tender epigastrium. BS active   Skin: Warm and dry. No nodule on exposed extremities. No rash on exposed extremities. M/S: No cyanosis. No joint deformity. No clubbing. Neuro: Awake. Grossly nonfocal    Psych: Oriented x 3. No anxiety or agitation. Labs:   Recent Labs     05/04/21  0558 05/05/21 0537 05/06/21  0605   WBC 8.5 8.2 6.9   HGB 8.3* 8.7* 8.2*   HCT 24.3* 25.4* 24.0*   * 850* 866*     Recent Labs     05/04/21 0558 05/05/21 0537 05/06/21  0605    138 136   K 3.7 3.6 4.0    101 99   CO2 30 27 28   BUN <2* 3* 3*   CREATININE <0.5* 0.6* <0.5*   CALCIUM 8.9 9.0 8.8   PHOS 3.2 3.1 3.2     Recent Labs     05/04/21 0558 05/05/21  0537 05/06/21  0605   AST 21 21 18   ALT 35 31 24   BILITOT 0.3 0.3 0.5   ALKPHOS 85 86 84     No results for input(s): INR in the last 72 hours. No results for input(s): David Killings in the last 72 hours. Urinalysis:      Lab Results   Component Value Date    NITRU Negative 04/23/2021    WBCUA 0-2 04/23/2021    BACTERIA 1+ 04/23/2021    RBCUA 3-4 04/23/2021    BLOODU MODERATE 04/23/2021    SPECGRAV >=1.030 04/23/2021    GLUCOSEU Negative 04/23/2021       Radiology:  CT ABDOMEN PELVIS W IV CONTRAST Additional Contrast? None   Final Result   Acute pancreatitis with heterogeneous enhancement of the pancreas suggesting   possible areas of necrosis. Developing more focal collections in the region of the pancreatic head and   left pericolic gutters suggest pseudocyst or abscess formation. Diffuse peripancreatic fluid appears slightly more; less than comparison   study. Slight improved aeration of the lung bases with persistent pleuroparenchymal   changes. XR CHEST (2 VW)   Final Result   1. Small bilateral pleural effusions.    2. Patchy bilateral subpleural opacities raising concern for an   atypical/viral pneumonia. CT ABDOMEN PELVIS W IV CONTRAST Additional Contrast? None   Final Result   Severe acute pancreatitis which has significantly worsened since 04/21/2021. Focal areas of the pancreas which do not enhance and are worrisome for   pancreatic necrosis. Suspected developing pseudocyst in the magalie hepatis. Large amount of inflammatory ascites in the upper abdomen and left pericolic   gutter extending into the pelvis. Feeding tube in place. IR GI TUBE W FLUOROSCOPY   Final Result   Successful placement of a post pyloric feeding tube. The tube was secured at   the 105 cm natty. IR GI TUBE W FLUOROSCOPY   Final Result   Successful placement of a post pyloric feeding tube. The tube was secured to   the nose at the 95 cm natty         CT ABDOMEN PELVIS W IV CONTRAST Additional Contrast? None   Final Result   Acute pancreatitis with questionable developing necrosis in the pancreatic   tail. Fluid collection abutting the inferior aspect of the pancreatic head likely a   pseudocyst or abscess. Marked diffuse hepatic steatosis. Assessment/Plan:    #Acute Recurrent Alcoholic Pancreatitis, severe with abscess and necrosis   - Pt f/w GI, he remotely underwent EUS with FNA on 8/20/20 which was neg for malignant cells with features suggestive of fat necrosis  - CT abd on admit showed possible necrosis and abscess. GI was consulted - added creon and pentoxifylline   - initially treated with aggressive IVF and bowel rest, zosyn started for possible abscess  - NJ tube placed x 2 and received post pyloric feeds x 1 day, but tube came out again on 4/28. Now on low fat diet and has been tolerating well   - He had persistent fevers and therefore zosyn was stopped and merrem started (D#8).   On 5/4 a repeat CT abd was obtained which showed necrosis and more focal collections consistent with abscess or pseudocyst formation. Dr. Jonel Flores discussed case with Dr. Rafiq Pinto at [facility], pt has been accepted for transfer to [facility] (discharge dependent at this time). - abstinence from alcohol strongly advised daily throughout admission      #Hypoxia - improving   - patient was 80% on RA . CXR with bilateral effusions and subpleural opacities   - suspect related to acute pancreatitis, not meeting criteria for ARDS   - he has had rapid COVID 19 x 2 since admission which are both negative- I do not suspect COVID 19 as the cause   - started IS  - pulmonary consultation- appreciate recs - sp IV lasix on 5/3 and 5/4. UOP documentation is not accurate, he continues to empty his own urinal   - Today he is stable on RA at rest     #Leukocytosis - Resolved  - likely 2/2 above     #Hypokalemia, Hypophosphatemia and hypomagnesemia  - cont to replete aggressively     #Hepatic Steatosis  #Elevated LFTs  - noted on CT abdomen  - LFTs trending down, monitor  - encouraged  alcohol cessation     #Alcohol Abuse  #Alcohol Withdrawal- resolved   - last drink was 4/20  - fall and seizure precautions in place   - sp CIWA, now off. Weaned off Librium- last dose 5/5/21     #Tachycardia - improved   - Started clonidine on admission at 0.1 mg TID and continued   - tachycardia likely from alcohol withdrawal syndrome & pancreatitis     #Hyponatremia - resolved   - monitor with IV hydration  - Improved; sodium is up to 138 today      #Normocytic anemia   - Hb 17 on admit (suspect with dehydration)-> 8 and remaining stable at 8 for the past few days  - no apparent bleeding  - iron studies with elevated ferritin, but low Iron      #Thrombocytosis  - suspect reactive, monitoring CBC daily. If no improvement will consider GI consultation      Awaiting transfer to . DVT Prophylaxis: Lovenox. Diet: DIET LOW FAT;   Dietary Nutrition Supplements: Clear Liquid Oral Supplement  Code Status: Full Code        Oral Malcomuver 5/5/21

## 2021-05-07 VITALS
DIASTOLIC BLOOD PRESSURE: 71 MMHG | TEMPERATURE: 99 F | BODY MASS INDEX: 22.44 KG/M2 | HEIGHT: 72 IN | RESPIRATION RATE: 18 BRPM | SYSTOLIC BLOOD PRESSURE: 106 MMHG | HEART RATE: 93 BPM | WEIGHT: 165.7 LBS | OXYGEN SATURATION: 99 %

## 2021-05-07 LAB
A/G RATIO: 0.8 (ref 1.1–2.2)
ALBUMIN SERPL-MCNC: 2.5 G/DL (ref 3.4–5)
ALP BLD-CCNC: 82 U/L (ref 40–129)
ALT SERPL-CCNC: 19 U/L (ref 10–40)
ANION GAP SERPL CALCULATED.3IONS-SCNC: 9 MMOL/L (ref 3–16)
AST SERPL-CCNC: 18 U/L (ref 15–37)
BILIRUB SERPL-MCNC: 0.5 MG/DL (ref 0–1)
BUN BLDV-MCNC: 3 MG/DL (ref 7–20)
CALCIUM SERPL-MCNC: 9 MG/DL (ref 8.3–10.6)
CHLORIDE BLD-SCNC: 100 MMOL/L (ref 99–110)
CO2: 27 MMOL/L (ref 21–32)
CREAT SERPL-MCNC: 0.6 MG/DL (ref 0.9–1.3)
FERRITIN: 840.4 NG/ML (ref 30–400)
FOLATE: 15.39 NG/ML (ref 4.78–24.2)
GFR AFRICAN AMERICAN: >60
GFR NON-AFRICAN AMERICAN: >60
GLOBULIN: 3 G/DL
GLUCOSE BLD-MCNC: 96 MG/DL (ref 70–99)
HCT VFR BLD CALC: 24.9 % (ref 40.5–52.5)
HEMOGLOBIN: 8.5 G/DL (ref 13.5–17.5)
IRON SATURATION: 16 % (ref 20–50)
IRON: 24 UG/DL (ref 59–158)
MCH RBC QN AUTO: 33.8 PG (ref 26–34)
MCHC RBC AUTO-ENTMCNC: 33.9 G/DL (ref 31–36)
MCV RBC AUTO: 99.6 FL (ref 80–100)
PDW BLD-RTO: 16.5 % (ref 12.4–15.4)
PHOSPHORUS: 3.1 MG/DL (ref 2.5–4.9)
PLATELET # BLD: 882 K/UL (ref 135–450)
PMV BLD AUTO: 7.4 FL (ref 5–10.5)
POTASSIUM REFLEX MAGNESIUM: 4.1 MMOL/L (ref 3.5–5.1)
RBC # BLD: 2.51 M/UL (ref 4.2–5.9)
SODIUM BLD-SCNC: 136 MMOL/L (ref 136–145)
TOTAL IRON BINDING CAPACITY: 149 UG/DL (ref 260–445)
TOTAL PROTEIN: 5.5 G/DL (ref 6.4–8.2)
VITAMIN B-12: 1886 PG/ML (ref 211–911)
WBC # BLD: 6.6 K/UL (ref 4–11)

## 2021-05-07 PROCEDURE — 81208 BCR/ABL1 GENE OTHER BP: CPT

## 2021-05-07 PROCEDURE — 2580000003 HC RX 258: Performed by: PHYSICIAN ASSISTANT

## 2021-05-07 PROCEDURE — 36415 COLL VENOUS BLD VENIPUNCTURE: CPT

## 2021-05-07 PROCEDURE — 82607 VITAMIN B-12: CPT

## 2021-05-07 PROCEDURE — 81270 JAK2 GENE: CPT

## 2021-05-07 PROCEDURE — 99239 HOSP IP/OBS DSCHRG MGMT >30: CPT | Performed by: INTERNAL MEDICINE

## 2021-05-07 PROCEDURE — 83550 IRON BINDING TEST: CPT

## 2021-05-07 PROCEDURE — 82728 ASSAY OF FERRITIN: CPT

## 2021-05-07 PROCEDURE — 6360000002 HC RX W HCPCS: Performed by: INTERNAL MEDICINE

## 2021-05-07 PROCEDURE — 85027 COMPLETE CBC AUTOMATED: CPT

## 2021-05-07 PROCEDURE — 83540 ASSAY OF IRON: CPT

## 2021-05-07 PROCEDURE — 6370000000 HC RX 637 (ALT 250 FOR IP): Performed by: INTERNAL MEDICINE

## 2021-05-07 PROCEDURE — 84238 ASSAY NONENDOCRINE RECEPTOR: CPT

## 2021-05-07 PROCEDURE — 6370000000 HC RX 637 (ALT 250 FOR IP): Performed by: PHYSICIAN ASSISTANT

## 2021-05-07 PROCEDURE — 80053 COMPREHEN METABOLIC PANEL: CPT

## 2021-05-07 PROCEDURE — 82746 ASSAY OF FOLIC ACID SERUM: CPT

## 2021-05-07 PROCEDURE — 81206 BCR/ABL1 GENE MAJOR BP: CPT

## 2021-05-07 PROCEDURE — 84100 ASSAY OF PHOSPHORUS: CPT

## 2021-05-07 PROCEDURE — 81207 BCR/ABL1 GENE MINOR BP: CPT

## 2021-05-07 PROCEDURE — 2580000003 HC RX 258: Performed by: INTERNAL MEDICINE

## 2021-05-07 RX ORDER — DIAZEPAM 5 MG/1
10 TABLET ORAL ONCE
Status: COMPLETED | OUTPATIENT
Start: 2021-05-07 | End: 2021-05-07

## 2021-05-07 RX ADMIN — QUETIAPINE FUMARATE 200 MG: 200 TABLET ORAL at 13:57

## 2021-05-07 RX ADMIN — PANCRELIPASE 72000 UNITS: 24000; 76000; 120000 CAPSULE, DELAYED RELEASE PELLETS ORAL at 12:10

## 2021-05-07 RX ADMIN — MEROPENEM 1000 MG: 1 INJECTION, POWDER, FOR SOLUTION INTRAVENOUS at 06:28

## 2021-05-07 RX ADMIN — Medication 100 MG: at 08:11

## 2021-05-07 RX ADMIN — PENTOXIFYLLINE 400 MG: 400 TABLET, FILM COATED, EXTENDED RELEASE ORAL at 12:03

## 2021-05-07 RX ADMIN — ENOXAPARIN SODIUM 40 MG: 40 INJECTION SUBCUTANEOUS at 08:11

## 2021-05-07 RX ADMIN — PANTOPRAZOLE SODIUM 40 MG: 40 TABLET, DELAYED RELEASE ORAL at 06:28

## 2021-05-07 RX ADMIN — PENTOXIFYLLINE 400 MG: 400 TABLET, FILM COATED, EXTENDED RELEASE ORAL at 08:10

## 2021-05-07 RX ADMIN — QUETIAPINE FUMARATE 200 MG: 200 TABLET ORAL at 08:11

## 2021-05-07 RX ADMIN — ONDANSETRON HYDROCHLORIDE 4 MG: 2 INJECTION, SOLUTION INTRAMUSCULAR; INTRAVENOUS at 12:03

## 2021-05-07 RX ADMIN — PANCRELIPASE 72000 UNITS: 24000; 76000; 120000 CAPSULE, DELAYED RELEASE PELLETS ORAL at 08:10

## 2021-05-07 RX ADMIN — OXYCODONE HYDROCHLORIDE 10 MG: 5 TABLET ORAL at 04:17

## 2021-05-07 RX ADMIN — OXYCODONE HYDROCHLORIDE 10 MG: 5 TABLET ORAL at 12:02

## 2021-05-07 RX ADMIN — CLONIDINE HYDROCHLORIDE 0.1 MG: 0.1 TABLET ORAL at 08:11

## 2021-05-07 RX ADMIN — DIAZEPAM 10 MG: 5 TABLET ORAL at 15:48

## 2021-05-07 RX ADMIN — PANTOPRAZOLE SODIUM 40 MG: 40 TABLET, DELAYED RELEASE ORAL at 15:48

## 2021-05-07 RX ADMIN — MULTIPLE VITAMINS W/ MINERALS TAB 1 TABLET: TAB at 08:11

## 2021-05-07 RX ADMIN — OXYCODONE HYDROCHLORIDE 5 MG: 5 TABLET ORAL at 08:12

## 2021-05-07 RX ADMIN — Medication 10 ML: at 08:11

## 2021-05-07 ASSESSMENT — PAIN DESCRIPTION - PROGRESSION: CLINICAL_PROGRESSION: NOT CHANGED

## 2021-05-07 ASSESSMENT — PAIN SCALES - GENERAL
PAINLEVEL_OUTOF10: 9
PAINLEVEL_OUTOF10: 9

## 2021-05-07 ASSESSMENT — PAIN DESCRIPTION - ONSET: ONSET: GRADUAL

## 2021-05-07 ASSESSMENT — PAIN DESCRIPTION - LOCATION: LOCATION: ABDOMEN

## 2021-05-07 ASSESSMENT — PAIN DESCRIPTION - ORIENTATION: ORIENTATION: MID

## 2021-05-07 NOTE — FLOWSHEET NOTE
05/07/21 1345   Vital Signs   Temp 98.9 °F (37.2 °C)   Temp Source Oral   Pulse 95   Heart Rate Source Monitor   Resp 18   /68   BP Location Right upper arm   Patient Position Semi fowlers   Level of Consciousness Alert (0)   MEWS Score 1   Pain Assessment   Pain Assessment 0-10   Pain Level 8   Oxygen Therapy   SpO2 95 %   O2 Device None (Room air)   O2 Flow Rate (L/min) 0 L/min     seroquel given. Clonidine not given.  Clonidine may drop pt BP

## 2021-05-07 NOTE — DISCHARGE INSTR - DIET
 Good nutrition is important when healing from an illness, injury, or surgery. Follow any nutrition recommendations given to you during your hospital stay.  If you were given an oral nutrition supplement while in the hospital, continue to take this supplement at home. You can take it with meals, in-between meals, and/or before bedtime. These supplements can be purchased at most local grocery stores, pharmacies, and chain super-stores.  If you have any questions about your diet or nutrition, call the hospital and ask for the dietitian. Learning About Low-Fat Eating  What is low-fat eating? Most food has some fat in it. Your body needs some fat to be healthy. But some kinds of fats are healthier than others. In a low-fat eating plan, you try to choose healthier fats and eat fewer unhealthy fats. Healthy fats include olive and canola oil. Try to avoid eating too much saturated fat, such as in cheese and meats. You do not need to cut all fat from your diet. But you can make healthier choices about the types and amount of fat you eat. Even though it is a good idea to choose healthier fats, it is still important to be careful of how much fat you eat, because all fats are high in calories. What are the different types of fats? Unhealthy fat  · Saturated fat. Saturated fats are mostly in animal foods, such as meat and dairy foods. Tropical oils, such as coconut oil, palm oil, and cocoa butter, are also saturated fats. Healthy fats  · Monounsaturated fat. Monounsaturated fats are liquid at room temperature but get solid when refrigerated. Eating foods that are high in this fat may help lower your \"bad\" (LDL) cholesterol, keep your \"good\" (HDL) cholesterol level up, and lower your chances of getting coronary artery disease. This fat is found in canola oil, olive oil, peanut oil, olives, avocados, nuts, and nut butters. · Polyunsaturated fat. Polyunsaturated fats are liquid at room temperature.  They are in safflower, sunflower, and corn oils. They are also the main fat in seafood. Omega-3 fatty acids are types of polyunsaturated fat. Eating fish may lower your chances of getting coronary artery disease. Fatty fish such as salmon and mackerel contain these healthy fatty acids. So do ground flaxseeds and flaxseed oil, soybeans, walnuts, and seeds. Why cut down on unhealthy fats? Eating foods that contain saturated fats can raise the LDL (\"bad\") cholesterol in your blood. Having a high level of LDL cholesterol increases your chance of hardening of the arteries (atherosclerosis), which can lead to heart disease, heart attack, and stroke. In general:  · No more than 10% of your daily calories should come from saturated fat. This is about 20 grams in a 2,000-calorie diet. · No more than 10% of your daily calories should come from polyunsaturated fat. This is about 20 grams in a 2,000-calorie diet. · Monounsaturated fats can be up to 15% of your daily calories. This is about 25 to 30 grams in a 2,000-calorie diet. If you're not sure how much fat you should be eating or how many calories you need each day to stay at a healthy weight, talk to a registered dietitian. A dietitian can help you create a plan that's right for you. What can you do to cut down on fat? Foods like cheese, butter, sausage, and desserts can have a lot of unhealthy fats. Try these tips for healthier meals at home and when you eat out. At home  · Fill up on fruits, vegetables, and whole grains. · Think of meat as a side dish instead of as the main part of your meal.  · When you do eat meat, make it extra-lean ground beef (97% lean), ground turkey breast (without skin added), meats with fat trimmed off before cooking, or skinless chicken. · Try main dishes that use whole wheat pasta, brown rice, dried beans, or vegetables. · Use cooking methods that use little or no fat, such as broiling, steaming, or grilling.  Use cooking spray instead of oil. If you use oil, use a monounsaturated oil, such as canola or olive oil. · Read food labels on canned, bottled, or packaged foods. Choose those with little saturated fat. When eating out at a restaurant  · Order foods that are broiled or poached instead of fried or breaded. · Cut back on the amount of butter or margarine that you use on bread. Use small amounts of olive oil instead. · Order sauces, gravies, and salad dressings on the side, and use only a little. · When you order pasta, choose tomato sauce instead of cream sauce. · Ask for salsa with your baked potato instead of sour cream, butter, cheese, or shipley. Where can you learn more? Go to https://[employer].ClientShow. org and sign in to your Humouno account. Enter Q204 in the NewsWhip box to learn more about \"Learning About Low-Fat Eating. \"     If you do not have an account, please click on the \"Sign Up Now\" link. Current as of: December 17, 2020               Content Version: 12.8  © 9154-2805 Ansible. Care instructions adapted under license by [facility] ([facility]). If you have questions about a medical condition or this instruction, always ask your healthcare professional. [address] any warranty or liability for your use of this information. Pancreatitis: Care Instructions  Overview     The pancreas is an organ behind the stomach. It makes hormones and enzymes to help your body digest food. But if these enzymes attack the pancreas, it can get inflamed. This is called pancreatitis. Most cases are caused by gallstones or by heavy alcohol use. If you take care of yourself at home, it will help you get better. It will also help you avoid more problems with your pancreas. How can you care for yourself at home? · Drink clear liquids and eat bland foods until you feel better. [place] foods include rice, dry toast, and crackers. They also include bananas and applesauce.   · Eat a low-fat diet until your doctor says your pancreas is healed. · Do not drink alcohol. Tell your doctor if you need help to quit. Counseling, support groups, and sometimes medicines can help you stay sober. · Be safe with medicines. Read and follow all instructions on the label. ? If the doctor gave you a prescription medicine for pain, take it as prescribed. ? If you are not taking a prescription pain medicine, ask your doctor if you can take an over-the-counter medicine. · If your doctor prescribed antibiotics, take them as directed. Do not stop taking them just because you feel better. You need to take the full course of antibiotics. · Get extra rest until you feel better. To prevent future problems with your pancreas  · Do not drink alcohol. · Tell your doctors and pharmacist that you've had pancreatitis. They can help you avoid medicines that may cause this problem again. Where can you learn more? Go to https://VSportopeovidio.BaseTrace. org and sign in to your [employer] account. Enter Y496 in the Speakaboos box to learn more about \"Pancreatitis: Care Instructions. \"     If you do not have an account, please click on the \"Sign Up Now\" link. Current as of: April 15, 2020               Content Version: 12.8  © 2006-2021 Healthwise, Incorporated. Care instructions adapted under license by [facility] ([facility]). If you have questions about a medical condition or this instruction, always ask your healthcare professional. [place] [address] any warranty or liability for your use of this information.

## 2021-05-07 NOTE — PROGRESS NOTES
Handoff report and transfer of care given at bedside to [facility]. Patient in stable condition, denies needs/concerns at this time. Call light within reach.

## 2021-05-07 NOTE — PROGRESS NOTES
Pt requesting pain medication for abdominal pain rating 9/10. PRN  pain medication given, see MAR. SR up x2, Call light and bedside table in easy reach. Denies any other needs at this time.

## 2021-05-07 NOTE — CONSULTS
HEMATOLOGY/ONCOLOGY CONSULTATION:     5/7/2021 11:59 AM    REASON FOR CONSULT: Thrombocytosis    PROVIDERS:  Janet Richards MD    CHIEF COMPLAINT:     Chief Complaint   Patient presents with    Abdominal Pain     pt states abd pain that started yesterday, hx pancreatitis. last alcohol yesterday       HISTORY OF PRESENT ILLNESS:     HPI:  39 WM with pmh HTN, HLD, bipolar disorder and alcohol abuse. He was admitted 4/21 and has had a prolonged hospitalization for pancreatitis and respiratory failure. Initially he was thought to have a pancreatic mass which turned out to be an abscess and necrosis. There was no evidence of malignancy. He had a normal platelet count prior to admission. It initially dropped and is now elevated in the 800s. PAST MEDICAL HISTORY:     Past Medical History:   Diagnosis Date    [facility]ism ([facility]     Antisocial behavior     Bipolar affective disorder ([facility]     Depression     Hyperlipidemia     Hypertension     Insomnia     Low back pain     Panic disorder     Polysubstance abuse ([facility]     Tobacco abuse        PAST SURGICAL HISTORY:        Past Surgical History:   Procedure Laterality Date    FOOT SURGERY  Age 17    ORIF Right foot.     UPPER GASTROINTESTINAL ENDOSCOPY N/A 11/5/2020    EGD ESOPHAGOGASTRODUODENOSCOPY performed by Nanci Ingram DO at [place] [address]  11/5/2020    EGD ESOPHAGOGASTRODUODENOSCOPY ULTRASOUND performed by Nanci Ingram DO at [facility] 96 EXTRACTION         SOCIAL HISTORY:     Social History     Socioeconomic History    Marital status: Legally      Spouse name: Not on file    Number of children: 1    Years of education: Not on file    Highest education level: Not on file   Occupational History    Occupation:    Social Needs    Financial resource strain: Not on file    Food insecurity     Worry: Not on file     Inability: Not on file   Nicolette Patel Grandfather     Alcohol Abuse Paternal Grandfather     Substance Abuse Paternal Grandfather     Alcohol Abuse Paternal Aunt     Alcohol Abuse Paternal Uncle        ALLERGIES:     Allergies as of 04/20/2021 - Review Complete 04/20/2021   Allergen Reaction Noted    Dilaudid [hydromorphone]  04/20/2021       MEDICATIONS:     No current facility-administered medications on file prior to encounter. Current Outpatient Medications on File Prior to Encounter   Medication Sig Dispense Refill    VORTIoxetine (TRINTELLIX) 5 MG tablet Take 10 mg by mouth daily      diazePAM (VALIUM) 10 MG tablet Take 10 mg by mouth every 8 hours as needed for Anxiety.  QUEtiapine (SEROQUEL) 50 MG tablet Take 1 tablet by mouth 3 times daily for 14 days (Patient taking differently: Take 200 mg by mouth 3 times daily ) 42 tablet 0    omeprazole (PRILOSEC) 20 MG delayed release capsule Take 40 mg by mouth Daily        REVIEW OF SYSTEMS:       10 point ROS completed. Pertinent positives in HPI, otherwise negative.      PHYSICAL EXAM:       Vitals:    05/07/21 0745   BP: 115/71   Pulse: 105   Resp: 16   Temp: 97.6 °F (36.4 °C)   SpO2: 97%       General appearance: thin, cachectic  Head: Normocephalic, without obvious abnormality, atraumatic  Neck: No palpable lymphadenopathy in supraclavicular or cervical chains  Lungs: Clear to auscultation bilaterally, no audible rales, wheezes or crackles  Heart: Regular rate and rhythm, S1, S2 normal  Abdomen: Soft, non-tender; bowel sounds normal; no masses,  no organomegaly  Extremities: without cyanosis, clubbing, edema or asymmetry  Skin: multiple tattoos       LABS:     Lab Results   Component Value Date    WBC 6.6 05/07/2021    HGB 8.5 (L) 05/07/2021    HCT 24.9 (L) 05/07/2021    MCV 99.6 05/07/2021     (H) 05/07/2021       Lab Results   Component Value Date    GLUCOSE 96 05/07/2021    BUN 3 (L) 05/07/2021    CREATININE 0.6 (L) 05/07/2021    K 4.1 05/07/2021    PHOS 3.1 05/07/2021 Lab Results   Component Value Date    ALKPHOS 82 05/07/2021    ALT 19 05/07/2021    AST 18 05/07/2021    BILITOT 0.5 05/07/2021    BILIDIR <0.2 08/06/2020    PROT 5.5 (L) 05/07/2021       IMAGING:     Xr Chest (2 Vw)  Result Date: 5/1/2021  1. Small bilateral pleural effusions. 2. Patchy bilateral subpleural opacities raising concern for an atypical/viral pneumonia. Ct Abdomen Pelvis W Iv Contrast Additional Contrast? None  Result Date: 5/4/2021  Acute pancreatitis with heterogeneous enhancement of the pancreas suggesting possible areas of necrosis. Developing more focal collections in the region of the pancreatic head and left pericolic gutters suggest pseudocyst or abscess formation. Diffuse peripancreatic fluid appears slightly more; less than comparison study. Slight improved aeration of the lung bases with persistent pleuroparenchymal changes. Ct Abdomen Pelvis W Iv Contrast Additional Contrast? None  Result Date: 4/27/2021  Severe acute pancreatitis which has significantly worsened since 04/21/2021. Focal areas of the pancreas which do not enhance and are worrisome for pancreatic necrosis. Suspected developing pseudocyst in the magalie hepatis. Large amount of inflammatory ascites in the upper abdomen and left pericolic gutter extending into the pelvis. Feeding tube in place. Ct Abdomen Pelvis W Iv Contrast Additional Contrast? None  Result Date: 4/21/2021  Acute pancreatitis with questionable developing necrosis in the pancreatic tail. Fluid collection abutting the inferior aspect of the pancreatic head likely a pseudocyst or abscess. Marked diffuse hepatic steatosis. Ir Dhruv Claiborne Tube W Fluoroscopy  Result Date: 4/27/2021  g tube. The tube was secured at the 105 cm natty. Ir Alexander Morfin W Fluoroscopy  Result Date: 4/26/2021  Successful placement of a post pyloric feeding tube.   The tube was secured to the nose at the 95 cm natty       ASSESSMENT:     Problem List Items Addressed This Visit Acute pancreatitis with infected necrosis - Primary    Relevant Medications    pantoprazole (PROTONIX) tablet 40 mg    lipase-protease-amylase (CREON) delayed release capsule 72,000 Units    oxyCODONE (ROXICODONE) immediate release tablet 5 mg    oxyCODONE (ROXICODONE) immediate release tablet 10 mg                PLAN:     1. Thrombocytosis    - Plt normal prior to admission  - initially dropped this admission, now elevated in 800s  - this is almost certainly a reactive change  - suspect they will improve with improvement in his panncreatitis  - will check MAGDALENO-2 and iron studies to ensure no secondary etiology    2.  Anemia     - Hgb normal prior to admission with acute drop  - multifactorial   - check iron studies, b12, folate  - monitor CBC  - transfuse if < 7    Jerzy Ramon MD

## 2021-05-07 NOTE — PROGRESS NOTES
Transfer center called with bed for pt at [place] [address] bed 8212, number for report [phone]. Primary nurse notified.

## 2021-05-07 NOTE — CARE COORDINATION
INTERDISCIPLINARY PLAN OF CARE CONFERENCE    Date/Time: 5/7/2021 9:39 AM  Completed by: Taylor Galvan Case Management      Patient Name:  Bahman Lazo  YOB: 1975  Admitting Diagnosis: Acute recurrent pancreatitis [K85.90]     Admit Date/Time:  4/20/2021 10:47 PM    Chart reviewed. Interdisciplinary team contacted or reviewed plan related to patient progress and discharge plans. Disciplines included Case Management, Nursing, and Dietitian.     Current Status: IP 04/21/2021  PT/OT recommendation for discharge plan of care:   Discharge Recommendations: Home 24 hr supervision  initially  DME needs for discharge: Shower Chair       Expected D/C Disposition:  TXF to [facility] (GI)  Confirmed plan with patient at bedside    Home O2 in place on admit: No

## 2021-05-07 NOTE — PLAN OF CARE
Problem: Pain:  Goal: Pain level will decrease  Description: Pain level will decrease  5/7/2021 0448 by Fleet Koyanagi, RN  Outcome: Ongoing  Goal: Control of acute pain  Description: Control of acute pain  5/7/2021 0448 by Fleet Koyanagi, RN  Outcome: Ongoing  Goal: Control of chronic pain  Description: Control of chronic pain  5/7/2021 1022 by Brittney Hu RN  Outcome: Ongoing  5/7/2021 0448 by Fleet Koyanagi, RN  Outcome: Ongoing  Goal: Patient's pain/discomfort is manageable  Description: Patient's pain/discomfort is manageable  5/7/2021 1022 by Brittney Hu RN  Outcome: Ongoing  5/7/2021 0448 by Fleet Koyanagi, RN  Outcome: Ongoing     Problem: Infection:  Goal: Will remain free from infection  Description: Will remain free from infection  5/7/2021 0448 by Fleet Koyanagi, RN  Outcome: Ongoing     Problem: Safety:  Goal: Free from accidental physical injury  Description: Free from accidental physical injury  5/7/2021 0448 by Fleet Koyanagi, RN  Outcome: Ongoing  Goal: Free from intentional harm  Description: Free from intentional harm  5/7/2021 0448 by Fleet Koyanagi, RN  Outcome: Ongoing     Problem: Daily Care:  Goal: Daily care needs are met  Description: Daily care needs are met  5/7/2021 0448 by Fleet Koyanagi, RN  Outcome: Ongoing     Problem: Skin Integrity:  Goal: Skin integrity will stabilize  Description: Skin integrity will stabilize  5/7/2021 0448 by Fleet Koyanagi, RN  Outcome: Ongoing     Problem: Discharge Planning:  Goal: Patients continuum of care needs are met  Description: Patients continuum of care needs are met  5/7/2021 0448 by Fleet Koyanagi, RN  Outcome: Ongoing     Problem: Nutrition  Goal: Optimal nutrition therapy  5/7/2021 0448 by Fleet Koyanagi, RN  Outcome: Ongoing  Goal: Understanding of nutritional guidelines  5/7/2021 0448 by Fleet Koyanagi, RN  Outcome: Ongoing

## 2021-05-07 NOTE — PROGRESS NOTES
Discussed with Dr. Nahid Ricci. Patient no longer febrile and tolerating low fat diet. No beds available at [facility] currently or foreseeable future. Discussed with patient importance of alcohol cessation which will be key to recovery and biggest risk of discharge. Planning to stay with father. Hypoxia improved, currently on room air. OK with discharge and outpatient follow up at [facility], however concern for compliance of patient which will be necessary and critical to recovery.

## 2021-05-07 NOTE — PROGRESS NOTES
Consult has been called to Dr. Ramirez Roman on 5/7/21. Perfect served dr sheets.  10:45 AM    Mallissa Leaver  5/7/2021

## 2021-05-07 NOTE — PROGRESS NOTES
Pt discharge instructions given. IV out. Denies needs. Educated on low fat diet. Educated on pancreatitis. Educated dad also. Home med collected and given to the patient.

## 2021-05-07 NOTE — PROGRESS NOTES
05/07/21 0745   Vital Signs   Temp 97.6 °F (36.4 °C)   Temp Source Oral   Pulse 105   Heart Rate Source Monitor   Resp 16   /71   BP Location Right upper arm   Patient Position Sitting   Level of Consciousness Alert (0)   MEWS Score 2   Pain Assessment   Pain Assessment 0-10   Pain Level 6   Oxygen Therapy   SpO2 97 %   O2 Device Nasal cannula   O2 Flow Rate (L/min) 2 L/min       VSS. PRN oxi mg given due to pt c/o pain. Assessment complete. meds passed. Awaiting for pt to be transported to [facility]. Clinical  notified RN stating that the transfer center did not have a bed for the patient. They would call around 1400 I was informed. HOB up. PT denies further needs at this time.  Will continue to monitor

## 2021-05-07 NOTE — PLAN OF CARE
Problem: Pain:  Goal: Pain level will decrease  Description: Pain level will decrease  5/7/2021 1641 by Opal Galan RN  Outcome: Completed  5/7/2021 0448 by Quinn Jerome RN  Outcome: Ongoing  Goal: Control of acute pain  Description: Control of acute pain  5/7/2021 1641 by Opal Galan RN  Outcome: Completed  5/7/2021 0448 by Quinn Jerome RN  Outcome: Ongoing  Goal: Control of chronic pain  Description: Control of chronic pain  5/7/2021 1641 by Opal Galan RN  Outcome: Completed  5/7/2021 1022 by Opal Galan RN  Outcome: Ongoing  5/7/2021 0448 by Quinn Jerome RN  Outcome: Ongoing  Goal: Patient's pain/discomfort is manageable  Description: Patient's pain/discomfort is manageable  5/7/2021 1641 by Opal Galan RN  Outcome: Completed  5/7/2021 1022 by Opal Galan RN  Outcome: Ongoing  5/7/2021 0448 by Quinn Jerome RN  Outcome: Ongoing     Problem: Infection:  Goal: Will remain free from infection  Description: Will remain free from infection  5/7/2021 1641 by Opal Galan RN  Outcome: Completed  5/7/2021 0448 by Quinn Jerome RN  Outcome: Ongoing     Problem: Safety:  Goal: Free from accidental physical injury  Description: Free from accidental physical injury  5/7/2021 1641 by Opal Galan RN  Outcome: Completed  5/7/2021 0448 by Quinn Jerome RN  Outcome: Ongoing  Goal: Free from intentional harm  Description: Free from intentional harm  5/7/2021 1641 by Opal Galan RN  Outcome: Completed  5/7/2021 0448 by Quinn Jerome RN  Outcome: Ongoing     Problem: Daily Care:  Goal: Daily care needs are met  Description: Daily care needs are met  5/7/2021 1641 by Opal Galan RN  Outcome: Completed  5/7/2021 0448 by Quinn Jerome RN  Outcome: Ongoing     Problem: Skin Integrity:  Goal: Skin integrity will stabilize  Description: Skin integrity will stabilize  5/7/2021 1641 by Opal Galan RN  Outcome: Completed  5/7/2021 0448 by Quinn Jerome RN  Outcome: Ongoing Problem: Discharge Planning:  Goal: Patients continuum of care needs are met  Description: Patients continuum of care needs are met  5/7/2021 1641 by Senia Wilder RN  Outcome: Completed  5/7/2021 0448 by Coty Hernandez RN  Outcome: Ongoing     Problem: Nutrition  Goal: Optimal nutrition therapy  5/7/2021 1641 by Senia Wilder RN  Outcome: Completed  5/7/2021 0448 by Coty Hernandez RN  Outcome: Ongoing  Goal: Understanding of nutritional guidelines  5/7/2021 1641 by Senia Wilder RN  Outcome: Completed  5/7/2021 0448 by Coty Hernandez RN  Outcome: Ongoing

## 2021-05-07 NOTE — PROGRESS NOTES
Charge nurse sent neptali to dr Lesley Chu regarding bed status at [address] [facility] stated \"send pt to  If he is willing\". PT stated that he has made his mind up to go home. He expresses that he does not want to go to , and that he will just follow up with the specialists. RN educated pt on benefits of going to [facility] but still refused. RN informed charge RN and charge RN informed the Clinical .

## 2021-05-09 LAB — SOLUBLE TRANSFERRIN RECEPT: 5.9 MG/L (ref 2.2–5)

## 2021-05-15 LAB
BCR-ABL1,  T(9;22) SOURCE: NORMAL
BCR/ABL T(9,22): NOT DETECTED

## 2021-05-16 LAB — JAK2 V617F MUTATION: 0 %

## 2022-07-13 ENCOUNTER — TELEPHONE (OUTPATIENT)
Dept: OTHER | Facility: CLINIC | Age: 47
End: 2022-07-13

## 2022-07-13 NOTE — TELEPHONE ENCOUNTER
Nayelychyu Baker was contacted by the [address] [place] [address] team to establish with a primary care provider. Records indicate they have not been seen in over a year. Need to verify PCP and schedule appointment. Last seen 2/17/2016 w Nicolas Pang MD. Will need New Pt apt to get established. Called and LVM for pt to return my call.      Kari Raymond, [address] [facility]    [phone]

## 2022-07-15 NOTE — TELEPHONE ENCOUNTER
Call attempt #2, Called and LVM for pt to return my call.      Jhonny Soria, [address] [facility]    [phone]

## 2022-07-26 NOTE — TELEPHONE ENCOUNTER
Call attempt #3, called @ [phone] and women who answered states pt passed away on 7/2/2022. Will update chart.

## (undated) DEVICE — ENDO CARRY-ON PROCEDURE KIT INCLUDES SUCTION TUBING, LUBRICANT, GAUZE, BIOHAZARD STICKER, TRANSPORT PAD AND INTERCEPT BEDSIDE KIT.: Brand: ENDO CARRY-ON PROCEDURE KIT

## (undated) DEVICE — CONMED SCOPE SAVER BITE BLOCK, 20X27 MM: Brand: SCOPE SAVER

## (undated) DEVICE — ECHOTIP ULTRA, CELIAC PLEXUS NEUROLYSIS NEEDLE: Brand: ECHOTIP

## (undated) DEVICE — ENDOSCOPIC ULTRASOUND FINE NEEDLE BIOPSY (FNB) DEVICE: Brand: ACQUIRE